# Patient Record
Sex: MALE | Race: WHITE | NOT HISPANIC OR LATINO | Employment: OTHER | URBAN - METROPOLITAN AREA
[De-identification: names, ages, dates, MRNs, and addresses within clinical notes are randomized per-mention and may not be internally consistent; named-entity substitution may affect disease eponyms.]

---

## 2017-01-01 ENCOUNTER — HOSPITAL ENCOUNTER (INPATIENT)
Facility: HOSPITAL | Age: 82
LOS: 1 days | DRG: 871 | End: 2017-09-21
Attending: EMERGENCY MEDICINE | Admitting: HOSPITALIST
Payer: MEDICARE

## 2017-01-01 ENCOUNTER — ANESTHESIA EVENT (OUTPATIENT)
Dept: SURGERY | Facility: HOSPITAL | Age: 82
DRG: 480 | End: 2017-01-01
Payer: MEDICARE

## 2017-01-01 ENCOUNTER — ANESTHESIA (OUTPATIENT)
Dept: SURGERY | Facility: HOSPITAL | Age: 82
DRG: 480 | End: 2017-01-01
Payer: MEDICARE

## 2017-01-01 ENCOUNTER — OUTPATIENT CASE MANAGEMENT (OUTPATIENT)
Dept: ADMINISTRATIVE | Facility: OTHER | Age: 82
End: 2017-01-01

## 2017-01-01 ENCOUNTER — PATIENT OUTREACH (OUTPATIENT)
Dept: ADMINISTRATIVE | Facility: CLINIC | Age: 82
End: 2017-01-01

## 2017-01-01 ENCOUNTER — HOSPITAL ENCOUNTER (INPATIENT)
Facility: HOSPITAL | Age: 82
LOS: 6 days | Discharge: NURSING, REHAB OR LONG-TERM CARE | DRG: 480 | End: 2017-07-07
Attending: HOSPITALIST | Admitting: HOSPITALIST
Payer: MEDICARE

## 2017-01-01 ENCOUNTER — OFFICE VISIT (OUTPATIENT)
Dept: NEUROLOGY | Facility: CLINIC | Age: 82
End: 2017-01-01
Payer: MEDICARE

## 2017-01-01 ENCOUNTER — OFFICE VISIT (OUTPATIENT)
Dept: ORTHOPEDICS | Facility: CLINIC | Age: 82
End: 2017-01-01
Payer: MEDICARE

## 2017-01-01 ENCOUNTER — HOSPITAL ENCOUNTER (OUTPATIENT)
Facility: HOSPITAL | Age: 82
Discharge: NURSING, REHAB OR LONG-TERM CARE | End: 2017-08-24
Attending: EMERGENCY MEDICINE | Admitting: HOSPITALIST
Payer: MEDICARE

## 2017-01-01 ENCOUNTER — HOSPITAL ENCOUNTER (INPATIENT)
Facility: HOSPITAL | Age: 82
LOS: 5 days | Discharge: HOME HEALTH | DRG: 871 | End: 2017-08-14
Attending: EMERGENCY MEDICINE | Admitting: HOSPITALIST
Payer: MEDICARE

## 2017-01-01 VITALS
BODY MASS INDEX: 25.06 KG/M2 | RESPIRATION RATE: 23 BRPM | SYSTOLIC BLOOD PRESSURE: 69 MMHG | WEIGHT: 185 LBS | DIASTOLIC BLOOD PRESSURE: 46 MMHG | HEIGHT: 72 IN | TEMPERATURE: 96 F | HEART RATE: 77 BPM | OXYGEN SATURATION: 89 %

## 2017-01-01 VITALS
DIASTOLIC BLOOD PRESSURE: 60 MMHG | HEART RATE: 64 BPM | BODY MASS INDEX: 25.91 KG/M2 | SYSTOLIC BLOOD PRESSURE: 107 MMHG | TEMPERATURE: 98 F | OXYGEN SATURATION: 97 % | WEIGHT: 191.31 LBS | RESPIRATION RATE: 18 BRPM | HEIGHT: 72 IN

## 2017-01-01 VITALS
DIASTOLIC BLOOD PRESSURE: 61 MMHG | SYSTOLIC BLOOD PRESSURE: 121 MMHG | WEIGHT: 202 LBS | HEART RATE: 70 BPM | RESPIRATION RATE: 16 BRPM | HEIGHT: 72 IN | TEMPERATURE: 97 F | OXYGEN SATURATION: 95 % | BODY MASS INDEX: 27.36 KG/M2

## 2017-01-01 VITALS
OXYGEN SATURATION: 92 % | BODY MASS INDEX: 25.07 KG/M2 | WEIGHT: 185.13 LBS | TEMPERATURE: 97 F | RESPIRATION RATE: 17 BRPM | SYSTOLIC BLOOD PRESSURE: 125 MMHG | HEIGHT: 72 IN | DIASTOLIC BLOOD PRESSURE: 61 MMHG | HEART RATE: 60 BPM

## 2017-01-01 VITALS
HEART RATE: 90 BPM | SYSTOLIC BLOOD PRESSURE: 194 MMHG | DIASTOLIC BLOOD PRESSURE: 103 MMHG | BODY MASS INDEX: 28.53 KG/M2 | WEIGHT: 199.31 LBS | HEIGHT: 70 IN

## 2017-01-01 DIAGNOSIS — I10 ESSENTIAL HYPERTENSION: ICD-10-CM

## 2017-01-01 DIAGNOSIS — F01.50 VASCULAR DEMENTIA WITHOUT BEHAVIORAL DISTURBANCE: ICD-10-CM

## 2017-01-01 DIAGNOSIS — I82.403 ACUTE DEEP VEIN THROMBOSIS (DVT) OF BOTH LOWER EXTREMITIES, UNSPECIFIED VEIN: Primary | ICD-10-CM

## 2017-01-01 DIAGNOSIS — E03.9 ACQUIRED HYPOTHYROIDISM: ICD-10-CM

## 2017-01-01 DIAGNOSIS — R09.02 HYPOXIA: ICD-10-CM

## 2017-01-01 DIAGNOSIS — S72.141D CLOSED DISPLACED INTERTROCHANTERIC FRACTURE OF RIGHT FEMUR WITH ROUTINE HEALING, SUBSEQUENT ENCOUNTER: Primary | ICD-10-CM

## 2017-01-01 DIAGNOSIS — G40.909 SEIZURE DISORDER: ICD-10-CM

## 2017-01-01 DIAGNOSIS — S72.009D ENCOUNTER FOR AFTERCARE FOR HEALING CLOSED TRAUMATIC FRACTURE OF HIP: ICD-10-CM

## 2017-01-01 DIAGNOSIS — R41.3 MEMORY LOSS: ICD-10-CM

## 2017-01-01 DIAGNOSIS — G40.209 PARTIAL SYMPTOMATIC EPILEPSY WITH COMPLEX PARTIAL SEIZURES, NOT INTRACTABLE, WITHOUT STATUS EPILEPTICUS: ICD-10-CM

## 2017-01-01 DIAGNOSIS — I26.02 ACUTE SADDLE PULMONARY EMBOLISM WITH ACUTE COR PULMONALE: ICD-10-CM

## 2017-01-01 DIAGNOSIS — R52 PAIN: ICD-10-CM

## 2017-01-01 DIAGNOSIS — G40.909 SEIZURE DISORDER: Primary | ICD-10-CM

## 2017-01-01 DIAGNOSIS — Z66 DNR (DO NOT RESUSCITATE): ICD-10-CM

## 2017-01-01 DIAGNOSIS — N17.9 AKI (ACUTE KIDNEY INJURY): Primary | ICD-10-CM

## 2017-01-01 DIAGNOSIS — I95.9 HYPOTENSION, UNSPECIFIED HYPOTENSION TYPE: ICD-10-CM

## 2017-01-01 DIAGNOSIS — I48.91 A-FIB: ICD-10-CM

## 2017-01-01 DIAGNOSIS — R65.10 SIRS (SYSTEMIC INFLAMMATORY RESPONSE SYNDROME): ICD-10-CM

## 2017-01-01 DIAGNOSIS — I26.92 ACUTE SADDLE PULMONARY EMBOLISM: ICD-10-CM

## 2017-01-01 DIAGNOSIS — S72.141A CLOSED INTERTROCHANTERIC FRACTURE OF RIGHT FEMUR, INITIAL ENCOUNTER: ICD-10-CM

## 2017-01-01 DIAGNOSIS — I82.593 CHRONIC EMBOLISM AND THROMBOSIS OF OTHER SPECIFIED DEEP VEIN OF LOWER EXTREMITY, BILATERAL: ICD-10-CM

## 2017-01-01 DIAGNOSIS — Z95.2 S/P AVR (AORTIC VALVE REPLACEMENT): ICD-10-CM

## 2017-01-01 DIAGNOSIS — Z51.5 PALLIATIVE CARE ENCOUNTER: ICD-10-CM

## 2017-01-01 DIAGNOSIS — R31.9 HEMATURIA: ICD-10-CM

## 2017-01-01 DIAGNOSIS — R65.21 SEPTIC SHOCK: ICD-10-CM

## 2017-01-01 DIAGNOSIS — N39.0 COMPLICATED UTI (URINARY TRACT INFECTION): ICD-10-CM

## 2017-01-01 DIAGNOSIS — I48.91 ATRIAL FIBRILLATION, UNSPECIFIED TYPE: ICD-10-CM

## 2017-01-01 DIAGNOSIS — E55.9 VITAMIN D DEFICIENCY DISEASE: ICD-10-CM

## 2017-01-01 DIAGNOSIS — M81.0 OSTEOPOROSIS, SENILE: ICD-10-CM

## 2017-01-01 DIAGNOSIS — A41.9 SEPTIC SHOCK: ICD-10-CM

## 2017-01-01 DIAGNOSIS — M41.20 SCOLIOSIS (AND KYPHOSCOLIOSIS), IDIOPATHIC: ICD-10-CM

## 2017-01-01 DIAGNOSIS — R41.0 DELIRIUM: ICD-10-CM

## 2017-01-01 DIAGNOSIS — N17.9 AKI (ACUTE KIDNEY INJURY): ICD-10-CM

## 2017-01-01 DIAGNOSIS — N30.01 ACUTE CYSTITIS WITH HEMATURIA: ICD-10-CM

## 2017-01-01 DIAGNOSIS — G93.40 ENCEPHALOPATHY: ICD-10-CM

## 2017-01-01 DIAGNOSIS — S72.009D: ICD-10-CM

## 2017-01-01 DIAGNOSIS — A41.9 SEPSIS, DUE TO UNSPECIFIED ORGANISM: ICD-10-CM

## 2017-01-01 DIAGNOSIS — R41.82 ALTERED MENTAL STATUS, UNSPECIFIED ALTERED MENTAL STATUS TYPE: Primary | ICD-10-CM

## 2017-01-01 DIAGNOSIS — R00.0 TACHYCARDIA: ICD-10-CM

## 2017-01-01 DIAGNOSIS — G93.41 SEPTIC ENCEPHALOPATHY: ICD-10-CM

## 2017-01-01 DIAGNOSIS — Z71.89 GOALS OF CARE, COUNSELING/DISCUSSION: ICD-10-CM

## 2017-01-01 DIAGNOSIS — N39.0 COMPLICATED UTI (URINARY TRACT INFECTION): Primary | ICD-10-CM

## 2017-01-01 DIAGNOSIS — A41.9 SEPSIS: ICD-10-CM

## 2017-01-01 DIAGNOSIS — R33.9 URINARY RETENTION: ICD-10-CM

## 2017-01-01 DIAGNOSIS — S72.141D CLOSED INTERTROCHANTERIC FRACTURE OF RIGHT FEMUR, WITH ROUTINE HEALING, SUBSEQUENT ENCOUNTER: Primary | ICD-10-CM

## 2017-01-01 DIAGNOSIS — I48.0 PAROXYSMAL ATRIAL FIBRILLATION: ICD-10-CM

## 2017-01-01 DIAGNOSIS — S72.91XA FEMUR FRACTURE, RIGHT: ICD-10-CM

## 2017-01-01 DIAGNOSIS — J96.01 ACUTE RESPIRATORY FAILURE WITH HYPOXIA: ICD-10-CM

## 2017-01-01 DIAGNOSIS — I26.92 ACUTE SADDLE PULMONARY EMBOLISM WITHOUT ACUTE COR PULMONALE: ICD-10-CM

## 2017-01-01 LAB
25(OH)D3+25(OH)D2 SERPL-MCNC: 16 NG/ML
ABO + RH BLD: NORMAL
ALBUMIN SERPL BCP-MCNC: 2.3 G/DL
ALBUMIN SERPL BCP-MCNC: 2.4 G/DL
ALBUMIN SERPL BCP-MCNC: 2.4 G/DL
ALBUMIN SERPL BCP-MCNC: 2.5 G/DL
ALBUMIN SERPL BCP-MCNC: 2.5 G/DL
ALBUMIN SERPL BCP-MCNC: 2.6 G/DL
ALBUMIN SERPL BCP-MCNC: 2.6 G/DL
ALBUMIN SERPL BCP-MCNC: 2.7 G/DL
ALBUMIN SERPL BCP-MCNC: 2.8 G/DL
ALBUMIN SERPL BCP-MCNC: 2.8 G/DL
ALBUMIN SERPL BCP-MCNC: 2.9 G/DL
ALBUMIN SERPL BCP-MCNC: 3.1 G/DL
ALBUMIN SERPL BCP-MCNC: 3.2 G/DL
ALBUMIN SERPL BCP-MCNC: 3.4 G/DL
ALLENS TEST: ABNORMAL
ALLENS TEST: ABNORMAL
ALLENS TEST: NORMAL
ALP SERPL-CCNC: 102 U/L
ALP SERPL-CCNC: 110 U/L
ALP SERPL-CCNC: 122 U/L
ALP SERPL-CCNC: 220 U/L
ALP SERPL-CCNC: 86 U/L
ALP SERPL-CCNC: 88 U/L
ALP SERPL-CCNC: 92 U/L
ALP SERPL-CCNC: 95 U/L
ALP SERPL-CCNC: 96 U/L
ALP SERPL-CCNC: 98 U/L
ALP SERPL-CCNC: 99 U/L
ALT SERPL W/O P-5'-P-CCNC: 15 U/L
ALT SERPL W/O P-5'-P-CCNC: 15 U/L
ALT SERPL W/O P-5'-P-CCNC: 163 U/L
ALT SERPL W/O P-5'-P-CCNC: 17 U/L
ALT SERPL W/O P-5'-P-CCNC: 20 U/L
ALT SERPL W/O P-5'-P-CCNC: 21 U/L
ALT SERPL W/O P-5'-P-CCNC: 22 U/L
ALT SERPL W/O P-5'-P-CCNC: 22 U/L
ALT SERPL W/O P-5'-P-CCNC: 23 U/L
ALT SERPL W/O P-5'-P-CCNC: 24 U/L
ALT SERPL W/O P-5'-P-CCNC: 25 U/L
AMORPH CRY UR QL COMP ASSIST: ABNORMAL
ANION GAP SERPL CALC-SCNC: 10 MMOL/L
ANION GAP SERPL CALC-SCNC: 11 MMOL/L
ANION GAP SERPL CALC-SCNC: 12 MMOL/L
ANION GAP SERPL CALC-SCNC: 14 MMOL/L
ANION GAP SERPL CALC-SCNC: 15 MMOL/L
ANION GAP SERPL CALC-SCNC: 8 MMOL/L
ANION GAP SERPL CALC-SCNC: 8 MMOL/L
ANION GAP SERPL CALC-SCNC: 9 MMOL/L
ANION GAP SERPL CALC-SCNC: 9 MMOL/L
ANISOCYTOSIS BLD QL SMEAR: SLIGHT
APTT BLDCRRT: 23.1 SEC
APTT BLDCRRT: 82.7 SEC
AST SERPL-CCNC: 15 U/L
AST SERPL-CCNC: 17 U/L
AST SERPL-CCNC: 21 U/L
AST SERPL-CCNC: 25 U/L
AST SERPL-CCNC: 28 U/L
AST SERPL-CCNC: 29 U/L
AST SERPL-CCNC: 366 U/L
AST SERPL-CCNC: 44 U/L
AST SERPL-CCNC: 52 U/L
BACTERIA #/AREA URNS AUTO: ABNORMAL /HPF
BACTERIA #/AREA URNS AUTO: NORMAL /HPF
BACTERIA BLD CULT: NORMAL
BACTERIA UR CULT: NORMAL
BACTERIA UR CULT: NORMAL
BASOPHILS # BLD AUTO: 0.01 K/UL
BASOPHILS # BLD AUTO: 0.02 K/UL
BASOPHILS # BLD AUTO: 0.03 K/UL
BASOPHILS # BLD AUTO: 0.04 K/UL
BASOPHILS # BLD AUTO: 0.05 K/UL
BASOPHILS # BLD AUTO: 0.06 K/UL
BASOPHILS # BLD AUTO: 0.08 K/UL
BASOPHILS NFR BLD: 0.1 %
BASOPHILS NFR BLD: 0.2 %
BASOPHILS NFR BLD: 0.3 %
BASOPHILS NFR BLD: 0.4 %
BASOPHILS NFR BLD: 0.5 %
BASOPHILS NFR BLD: 0.5 %
BASOPHILS NFR BLD: 0.7 %
BASOPHILS NFR BLD: 1 %
BILIRUB SERPL-MCNC: 0.3 MG/DL
BILIRUB SERPL-MCNC: 0.5 MG/DL
BILIRUB SERPL-MCNC: 0.6 MG/DL
BILIRUB SERPL-MCNC: 0.7 MG/DL
BILIRUB SERPL-MCNC: 0.7 MG/DL
BILIRUB SERPL-MCNC: 1.4 MG/DL
BILIRUB UR QL STRIP: NEGATIVE
BLD GP AB SCN CELLS X3 SERPL QL: NORMAL
BLD PROD TYP BPU: NORMAL
BLOOD UNIT EXPIRATION DATE: NORMAL
BLOOD UNIT TYPE CODE: 5100
BLOOD UNIT TYPE: NORMAL
BNP SERPL-MCNC: 153 PG/ML
BNP SERPL-MCNC: 321 PG/ML
BNP SERPL-MCNC: 62 PG/ML
BUN SERPL-MCNC: 12 MG/DL
BUN SERPL-MCNC: 14 MG/DL
BUN SERPL-MCNC: 16 MG/DL
BUN SERPL-MCNC: 17 MG/DL
BUN SERPL-MCNC: 18 MG/DL
BUN SERPL-MCNC: 21 MG/DL
BUN SERPL-MCNC: 21 MG/DL
BUN SERPL-MCNC: 22 MG/DL
BUN SERPL-MCNC: 23 MG/DL
BUN SERPL-MCNC: 24 MG/DL
BUN SERPL-MCNC: 27 MG/DL
BUN SERPL-MCNC: 29 MG/DL
BUN SERPL-MCNC: 29 MG/DL
BUN SERPL-MCNC: 39 MG/DL
CALCIUM SERPL-MCNC: 7.9 MG/DL
CALCIUM SERPL-MCNC: 8.1 MG/DL
CALCIUM SERPL-MCNC: 8.1 MG/DL
CALCIUM SERPL-MCNC: 8.3 MG/DL
CALCIUM SERPL-MCNC: 8.3 MG/DL
CALCIUM SERPL-MCNC: 8.4 MG/DL
CALCIUM SERPL-MCNC: 8.4 MG/DL
CALCIUM SERPL-MCNC: 8.5 MG/DL
CALCIUM SERPL-MCNC: 8.6 MG/DL
CALCIUM SERPL-MCNC: 8.7 MG/DL
CALCIUM SERPL-MCNC: 8.7 MG/DL
CALCIUM SERPL-MCNC: 8.8 MG/DL
CALCIUM SERPL-MCNC: 8.8 MG/DL
CALCIUM SERPL-MCNC: 8.9 MG/DL
CALCIUM SERPL-MCNC: 9 MG/DL
CALCIUM SERPL-MCNC: 9 MG/DL
CALCIUM SERPL-MCNC: 9.1 MG/DL
CHLORIDE SERPL-SCNC: 107 MMOL/L
CHLORIDE SERPL-SCNC: 108 MMOL/L
CHLORIDE SERPL-SCNC: 109 MMOL/L
CHLORIDE SERPL-SCNC: 111 MMOL/L
CHLORIDE SERPL-SCNC: 112 MMOL/L
CHLORIDE SERPL-SCNC: 113 MMOL/L
CHLORIDE SERPL-SCNC: 114 MMOL/L
CLARITY UR REFRACT.AUTO: ABNORMAL
CLARITY UR REFRACT.AUTO: CLEAR
CO2 SERPL-SCNC: 16 MMOL/L
CO2 SERPL-SCNC: 18 MMOL/L
CO2 SERPL-SCNC: 19 MMOL/L
CO2 SERPL-SCNC: 20 MMOL/L
CO2 SERPL-SCNC: 21 MMOL/L
CO2 SERPL-SCNC: 22 MMOL/L
CO2 SERPL-SCNC: 22 MMOL/L
CODING SYSTEM: NORMAL
COLOR UR AUTO: ABNORMAL
COLOR UR AUTO: YELLOW
CORTIS SERPL-MCNC: 47.3 UG/DL
CREAT SERPL-MCNC: 0.9 MG/DL
CREAT SERPL-MCNC: 1 MG/DL
CREAT SERPL-MCNC: 1.1 MG/DL
CREAT SERPL-MCNC: 1.2 MG/DL
CREAT SERPL-MCNC: 1.3 MG/DL
CREAT SERPL-MCNC: 1.4 MG/DL
CREAT SERPL-MCNC: 1.6 MG/DL
CREAT SERPL-MCNC: 1.9 MG/DL
CREAT UR-MCNC: 26 MG/DL
DELSYS: ABNORMAL
DELSYS: ABNORMAL
DELSYS: NORMAL
DIFFERENTIAL METHOD: ABNORMAL
DISPENSE STATUS: NORMAL
EOSINOPHIL # BLD AUTO: 0 K/UL
EOSINOPHIL # BLD AUTO: 0 K/UL
EOSINOPHIL # BLD AUTO: 0.1 K/UL
EOSINOPHIL # BLD AUTO: 0.2 K/UL
EOSINOPHIL # BLD AUTO: 0.3 K/UL
EOSINOPHIL # BLD AUTO: 0.4 K/UL
EOSINOPHIL # BLD AUTO: 0.5 K/UL
EOSINOPHIL # BLD AUTO: 0.6 K/UL
EOSINOPHIL # BLD AUTO: 0.7 K/UL
EOSINOPHIL NFR BLD: 0.2 %
EOSINOPHIL NFR BLD: 0.3 %
EOSINOPHIL NFR BLD: 1 %
EOSINOPHIL NFR BLD: 1 %
EOSINOPHIL NFR BLD: 1.2 %
EOSINOPHIL NFR BLD: 1.3 %
EOSINOPHIL NFR BLD: 1.3 %
EOSINOPHIL NFR BLD: 1.5 %
EOSINOPHIL NFR BLD: 1.7 %
EOSINOPHIL NFR BLD: 1.7 %
EOSINOPHIL NFR BLD: 2.4 %
EOSINOPHIL NFR BLD: 3.1 %
EOSINOPHIL NFR BLD: 3.2 %
EOSINOPHIL NFR BLD: 3.2 %
EOSINOPHIL NFR BLD: 3.6 %
EOSINOPHIL NFR BLD: 4.2 %
EOSINOPHIL NFR BLD: 5.8 %
EOSINOPHIL NFR BLD: 6.4 %
EOSINOPHIL NFR BLD: 6.5 %
EOSINOPHIL NFR BLD: 6.9 %
EOSINOPHIL NFR BLD: 7.1 %
EOSINOPHIL NFR BLD: 7.5 %
ERYTHROCYTE [DISTWIDTH] IN BLOOD BY AUTOMATED COUNT: 13.9 %
ERYTHROCYTE [DISTWIDTH] IN BLOOD BY AUTOMATED COUNT: 14 %
ERYTHROCYTE [DISTWIDTH] IN BLOOD BY AUTOMATED COUNT: 14.1 %
ERYTHROCYTE [DISTWIDTH] IN BLOOD BY AUTOMATED COUNT: 14.2 %
ERYTHROCYTE [DISTWIDTH] IN BLOOD BY AUTOMATED COUNT: 14.4 %
ERYTHROCYTE [DISTWIDTH] IN BLOOD BY AUTOMATED COUNT: 14.6 %
ERYTHROCYTE [DISTWIDTH] IN BLOOD BY AUTOMATED COUNT: 14.7 %
ERYTHROCYTE [DISTWIDTH] IN BLOOD BY AUTOMATED COUNT: 14.9 %
ERYTHROCYTE [DISTWIDTH] IN BLOOD BY AUTOMATED COUNT: 14.9 %
ERYTHROCYTE [DISTWIDTH] IN BLOOD BY AUTOMATED COUNT: 15 %
ERYTHROCYTE [DISTWIDTH] IN BLOOD BY AUTOMATED COUNT: 15.1 %
ERYTHROCYTE [DISTWIDTH] IN BLOOD BY AUTOMATED COUNT: 15.3 %
ERYTHROCYTE [DISTWIDTH] IN BLOOD BY AUTOMATED COUNT: 17 %
ERYTHROCYTE [SEDIMENTATION RATE] IN BLOOD BY WESTERGREN METHOD: 16 MM/H
EST. GFR  (AFRICAN AMERICAN): 35.6 ML/MIN/1.73 M^2
EST. GFR  (AFRICAN AMERICAN): 43.8 ML/MIN/1.73 M^2
EST. GFR  (AFRICAN AMERICAN): 51.5 ML/MIN/1.73 M^2
EST. GFR  (AFRICAN AMERICAN): 56.3 ML/MIN/1.73 M^2
EST. GFR  (AFRICAN AMERICAN): >60 ML/MIN/1.73 M^2
EST. GFR  (NON AFRICAN AMERICAN): 30.8 ML/MIN/1.73 M^2
EST. GFR  (NON AFRICAN AMERICAN): 37.9 ML/MIN/1.73 M^2
EST. GFR  (NON AFRICAN AMERICAN): 44.5 ML/MIN/1.73 M^2
EST. GFR  (NON AFRICAN AMERICAN): 48.7 ML/MIN/1.73 M^2
EST. GFR  (NON AFRICAN AMERICAN): 53.7 ML/MIN/1.73 M^2
EST. GFR  (NON AFRICAN AMERICAN): 59.6 ML/MIN/1.73 M^2
EST. GFR  (NON AFRICAN AMERICAN): >60 ML/MIN/1.73 M^2
ESTIMATED PA SYSTOLIC PRESSURE: 31.52
FACT X PPP CHRO-ACNC: 0.17 IU/ML
FACT X PPP CHRO-ACNC: 0.17 IU/ML
FACT X PPP CHRO-ACNC: 0.35 IU/ML
FACT X PPP CHRO-ACNC: 0.45 IU/ML
FACT X PPP CHRO-ACNC: 0.47 IU/ML
FACT X PPP CHRO-ACNC: 0.76 IU/ML
FACT X PPP CHRO-ACNC: 1.1 IU/ML
FIO2: 50
GLUCOSE SERPL-MCNC: 108 MG/DL
GLUCOSE SERPL-MCNC: 110 MG/DL
GLUCOSE SERPL-MCNC: 112 MG/DL
GLUCOSE SERPL-MCNC: 113 MG/DL
GLUCOSE SERPL-MCNC: 115 MG/DL
GLUCOSE SERPL-MCNC: 121 MG/DL
GLUCOSE SERPL-MCNC: 122 MG/DL
GLUCOSE SERPL-MCNC: 124 MG/DL
GLUCOSE SERPL-MCNC: 130 MG/DL
GLUCOSE SERPL-MCNC: 131 MG/DL
GLUCOSE SERPL-MCNC: 136 MG/DL
GLUCOSE SERPL-MCNC: 142 MG/DL
GLUCOSE SERPL-MCNC: 145 MG/DL
GLUCOSE SERPL-MCNC: 146 MG/DL
GLUCOSE SERPL-MCNC: 146 MG/DL
GLUCOSE SERPL-MCNC: 81 MG/DL
GLUCOSE SERPL-MCNC: 90 MG/DL
GLUCOSE SERPL-MCNC: 96 MG/DL
GLUCOSE SERPL-MCNC: 98 MG/DL
GLUCOSE UR QL STRIP: NEGATIVE
HCO3 UR-SCNC: 18.8 MMOL/L (ref 24–28)
HCO3 UR-SCNC: 21.5 MMOL/L (ref 24–28)
HCT VFR BLD AUTO: 28.4 %
HCT VFR BLD AUTO: 30.3 %
HCT VFR BLD AUTO: 32 %
HCT VFR BLD AUTO: 32.1 %
HCT VFR BLD AUTO: 32.2 %
HCT VFR BLD AUTO: 32.6 %
HCT VFR BLD AUTO: 32.7 %
HCT VFR BLD AUTO: 32.8 %
HCT VFR BLD AUTO: 33.7 %
HCT VFR BLD AUTO: 33.9 %
HCT VFR BLD AUTO: 34 %
HCT VFR BLD AUTO: 34.4 %
HCT VFR BLD AUTO: 34.7 %
HCT VFR BLD AUTO: 34.9 %
HCT VFR BLD AUTO: 35 %
HCT VFR BLD AUTO: 35.4 %
HCT VFR BLD AUTO: 35.9 %
HCT VFR BLD AUTO: 36.9 %
HCT VFR BLD AUTO: 39.1 %
HCT VFR BLD AUTO: 39.5 %
HGB BLD-MCNC: 10.3 G/DL
HGB BLD-MCNC: 10.4 G/DL
HGB BLD-MCNC: 10.5 G/DL
HGB BLD-MCNC: 10.6 G/DL
HGB BLD-MCNC: 10.6 G/DL
HGB BLD-MCNC: 10.7 G/DL
HGB BLD-MCNC: 10.8 G/DL
HGB BLD-MCNC: 10.8 G/DL
HGB BLD-MCNC: 10.9 G/DL
HGB BLD-MCNC: 11 G/DL
HGB BLD-MCNC: 11.3 G/DL
HGB BLD-MCNC: 11.3 G/DL
HGB BLD-MCNC: 11.5 G/DL
HGB BLD-MCNC: 11.6 G/DL
HGB BLD-MCNC: 11.6 G/DL
HGB BLD-MCNC: 12.3 G/DL
HGB BLD-MCNC: 12.8 G/DL
HGB BLD-MCNC: 12.8 G/DL
HGB BLD-MCNC: 8.8 G/DL
HGB BLD-MCNC: 9.8 G/DL
HGB UR QL STRIP: ABNORMAL
HYALINE CASTS UR QL AUTO: 0 /LPF
INR PPP: 1
INR PPP: 1.1
INR PPP: 1.3
KETONES UR QL STRIP: ABNORMAL
KETONES UR QL STRIP: NEGATIVE
LACTATE SERPL-SCNC: 0.8 MMOL/L
LACTATE SERPL-SCNC: 1.1 MMOL/L
LACTATE SERPL-SCNC: 9.1 MMOL/L
LDH SERPL L TO P-CCNC: 1.08 MMOL/L (ref 0.36–1.25)
LEUKOCYTE ESTERASE UR QL STRIP: ABNORMAL
LEUKOCYTE ESTERASE UR QL STRIP: NEGATIVE
LIPASE SERPL-CCNC: 29 U/L
LYMPHOCYTES # BLD AUTO: 1.1 K/UL
LYMPHOCYTES # BLD AUTO: 2.1 K/UL
LYMPHOCYTES # BLD AUTO: 2.1 K/UL
LYMPHOCYTES # BLD AUTO: 2.2 K/UL
LYMPHOCYTES # BLD AUTO: 2.9 K/UL
LYMPHOCYTES # BLD AUTO: 3 K/UL
LYMPHOCYTES # BLD AUTO: 3 K/UL
LYMPHOCYTES # BLD AUTO: 3.1 K/UL
LYMPHOCYTES # BLD AUTO: 3.1 K/UL
LYMPHOCYTES # BLD AUTO: 3.2 K/UL
LYMPHOCYTES # BLD AUTO: 3.2 K/UL
LYMPHOCYTES # BLD AUTO: 3.3 K/UL
LYMPHOCYTES # BLD AUTO: 3.3 K/UL
LYMPHOCYTES # BLD AUTO: 3.5 K/UL
LYMPHOCYTES # BLD AUTO: 3.6 K/UL
LYMPHOCYTES # BLD AUTO: 3.9 K/UL
LYMPHOCYTES # BLD AUTO: 4.1 K/UL
LYMPHOCYTES # BLD AUTO: 4.6 K/UL
LYMPHOCYTES NFR BLD: 20.9 %
LYMPHOCYTES NFR BLD: 21.1 %
LYMPHOCYTES NFR BLD: 23.2 %
LYMPHOCYTES NFR BLD: 23.2 %
LYMPHOCYTES NFR BLD: 23.4 %
LYMPHOCYTES NFR BLD: 23.9 %
LYMPHOCYTES NFR BLD: 27.9 %
LYMPHOCYTES NFR BLD: 29.4 %
LYMPHOCYTES NFR BLD: 29.7 %
LYMPHOCYTES NFR BLD: 29.8 %
LYMPHOCYTES NFR BLD: 30.1 %
LYMPHOCYTES NFR BLD: 30.5 %
LYMPHOCYTES NFR BLD: 30.8 %
LYMPHOCYTES NFR BLD: 31.3 %
LYMPHOCYTES NFR BLD: 33.5 %
LYMPHOCYTES NFR BLD: 35.2 %
LYMPHOCYTES NFR BLD: 36.3 %
LYMPHOCYTES NFR BLD: 37 %
LYMPHOCYTES NFR BLD: 39.8 %
LYMPHOCYTES NFR BLD: 40.6 %
LYMPHOCYTES NFR BLD: 45.1 %
LYMPHOCYTES NFR BLD: 46.4 %
MAGNESIUM SERPL-MCNC: 1.5 MG/DL
MAGNESIUM SERPL-MCNC: 1.7 MG/DL
MAGNESIUM SERPL-MCNC: 1.8 MG/DL
MAGNESIUM SERPL-MCNC: 1.9 MG/DL
MAGNESIUM SERPL-MCNC: 2 MG/DL
MAGNESIUM SERPL-MCNC: 2 MG/DL
MAGNESIUM SERPL-MCNC: 2.1 MG/DL
MAGNESIUM SERPL-MCNC: 2.2 MG/DL
MAGNESIUM SERPL-MCNC: 2.4 MG/DL
MCH RBC QN AUTO: 28.8 PG
MCH RBC QN AUTO: 29.1 PG
MCH RBC QN AUTO: 29.3 PG
MCH RBC QN AUTO: 29.4 PG
MCH RBC QN AUTO: 29.5 PG
MCH RBC QN AUTO: 29.5 PG
MCH RBC QN AUTO: 29.7 PG
MCH RBC QN AUTO: 29.9 PG
MCH RBC QN AUTO: 30.1 PG
MCH RBC QN AUTO: 30.1 PG
MCH RBC QN AUTO: 30.2 PG
MCH RBC QN AUTO: 30.2 PG
MCH RBC QN AUTO: 30.3 PG
MCH RBC QN AUTO: 30.4 PG
MCH RBC QN AUTO: 30.4 PG
MCH RBC QN AUTO: 30.6 PG
MCH RBC QN AUTO: 30.7 PG
MCHC RBC AUTO-ENTMCNC: 31 G/DL
MCHC RBC AUTO-ENTMCNC: 31.8 G/DL
MCHC RBC AUTO-ENTMCNC: 31.8 G/DL
MCHC RBC AUTO-ENTMCNC: 31.9 G/DL
MCHC RBC AUTO-ENTMCNC: 32 G/DL
MCHC RBC AUTO-ENTMCNC: 32 G/DL
MCHC RBC AUTO-ENTMCNC: 32.1 %
MCHC RBC AUTO-ENTMCNC: 32.1 %
MCHC RBC AUTO-ENTMCNC: 32.2 %
MCHC RBC AUTO-ENTMCNC: 32.2 G/DL
MCHC RBC AUTO-ENTMCNC: 32.3 %
MCHC RBC AUTO-ENTMCNC: 32.3 %
MCHC RBC AUTO-ENTMCNC: 32.3 G/DL
MCHC RBC AUTO-ENTMCNC: 32.3 G/DL
MCHC RBC AUTO-ENTMCNC: 32.4 %
MCHC RBC AUTO-ENTMCNC: 32.4 %
MCHC RBC AUTO-ENTMCNC: 32.7 %
MCHC RBC AUTO-ENTMCNC: 33 G/DL
MCHC RBC AUTO-ENTMCNC: 33.1 %
MCHC RBC AUTO-ENTMCNC: 33.1 %
MCHC RBC AUTO-ENTMCNC: 33.2 %
MCHC RBC AUTO-ENTMCNC: 33.3 %
MCV RBC AUTO: 89 FL
MCV RBC AUTO: 90 FL
MCV RBC AUTO: 91 FL
MCV RBC AUTO: 92 FL
MCV RBC AUTO: 93 FL
MCV RBC AUTO: 94 FL
MICROSCOPIC COMMENT: ABNORMAL
MICROSCOPIC COMMENT: NORMAL
MODE: ABNORMAL
MONOCYTES # BLD AUTO: 0.1 K/UL
MONOCYTES # BLD AUTO: 0.4 K/UL
MONOCYTES # BLD AUTO: 0.5 K/UL
MONOCYTES # BLD AUTO: 0.6 K/UL
MONOCYTES # BLD AUTO: 0.7 K/UL
MONOCYTES # BLD AUTO: 0.8 K/UL
MONOCYTES # BLD AUTO: 0.9 K/UL
MONOCYTES # BLD AUTO: 1 K/UL
MONOCYTES # BLD AUTO: 1.2 K/UL
MONOCYTES # BLD AUTO: 1.3 K/UL
MONOCYTES NFR BLD: 0.9 %
MONOCYTES NFR BLD: 5.3 %
MONOCYTES NFR BLD: 5.7 %
MONOCYTES NFR BLD: 5.9 %
MONOCYTES NFR BLD: 6.3 %
MONOCYTES NFR BLD: 6.5 %
MONOCYTES NFR BLD: 6.7 %
MONOCYTES NFR BLD: 7.4 %
MONOCYTES NFR BLD: 7.7 %
MONOCYTES NFR BLD: 7.7 %
MONOCYTES NFR BLD: 7.8 %
MONOCYTES NFR BLD: 7.9 %
MONOCYTES NFR BLD: 8 %
MONOCYTES NFR BLD: 8 %
MONOCYTES NFR BLD: 8.1 %
MONOCYTES NFR BLD: 8.6 %
MONOCYTES NFR BLD: 8.8 %
MONOCYTES NFR BLD: 9 %
MONOCYTES NFR BLD: 9.2 %
MONOCYTES NFR BLD: 9.2 %
NEUTROPHILS # BLD AUTO: 3.1 K/UL
NEUTROPHILS # BLD AUTO: 3.2 K/UL
NEUTROPHILS # BLD AUTO: 3.6 K/UL
NEUTROPHILS # BLD AUTO: 3.8 K/UL
NEUTROPHILS # BLD AUTO: 3.8 K/UL
NEUTROPHILS # BLD AUTO: 4.2 K/UL
NEUTROPHILS # BLD AUTO: 4.2 K/UL
NEUTROPHILS # BLD AUTO: 4.3 K/UL
NEUTROPHILS # BLD AUTO: 5.9 K/UL
NEUTROPHILS # BLD AUTO: 6 K/UL
NEUTROPHILS # BLD AUTO: 6.1 K/UL
NEUTROPHILS # BLD AUTO: 6.1 K/UL
NEUTROPHILS # BLD AUTO: 6.2 K/UL
NEUTROPHILS # BLD AUTO: 6.4 K/UL
NEUTROPHILS # BLD AUTO: 6.5 K/UL
NEUTROPHILS # BLD AUTO: 6.6 K/UL
NEUTROPHILS # BLD AUTO: 6.9 K/UL
NEUTROPHILS # BLD AUTO: 7.3 K/UL
NEUTROPHILS # BLD AUTO: 7.7 K/UL
NEUTROPHILS # BLD AUTO: 8.1 K/UL
NEUTROPHILS # BLD AUTO: 8.5 K/UL
NEUTROPHILS # BLD AUTO: 9.6 K/UL
NEUTROPHILS NFR BLD: 38.8 %
NEUTROPHILS NFR BLD: 42.3 %
NEUTROPHILS NFR BLD: 44 %
NEUTROPHILS NFR BLD: 47.2 %
NEUTROPHILS NFR BLD: 47.5 %
NEUTROPHILS NFR BLD: 48.1 %
NEUTROPHILS NFR BLD: 49 %
NEUTROPHILS NFR BLD: 56 %
NEUTROPHILS NFR BLD: 56.8 %
NEUTROPHILS NFR BLD: 58.7 %
NEUTROPHILS NFR BLD: 59.5 %
NEUTROPHILS NFR BLD: 60 %
NEUTROPHILS NFR BLD: 60 %
NEUTROPHILS NFR BLD: 61 %
NEUTROPHILS NFR BLD: 61.6 %
NEUTROPHILS NFR BLD: 61.8 %
NEUTROPHILS NFR BLD: 64.7 %
NEUTROPHILS NFR BLD: 65.9 %
NEUTROPHILS NFR BLD: 66.9 %
NEUTROPHILS NFR BLD: 66.9 %
NEUTROPHILS NFR BLD: 69.1 %
NEUTROPHILS NFR BLD: 77.8 %
NITRITE UR QL STRIP: NEGATIVE
NITRITE UR QL STRIP: POSITIVE
PCO2 BLDA: 32.6 MMHG (ref 35–45)
PCO2 BLDA: 38.7 MMHG (ref 35–45)
PEEP: 5
PH SMN: 7.29 [PH] (ref 7.35–7.45)
PH SMN: 7.43 [PH] (ref 7.35–7.45)
PH UR STRIP: 5 [PH] (ref 5–8)
PH UR STRIP: 6 [PH] (ref 5–8)
PH UR STRIP: 7 [PH] (ref 5–8)
PH UR STRIP: 8 [PH] (ref 5–8)
PHENOBARB SERPL-MCNC: 10.6 UG/DL
PHENOBARB SERPL-MCNC: 13 UG/DL
PHENOBARB SERPL-MCNC: 16.5 UG/DL
PHOSPHATE SERPL-MCNC: 2.5 MG/DL
PHOSPHATE SERPL-MCNC: 2.6 MG/DL
PHOSPHATE SERPL-MCNC: 2.6 MG/DL
PHOSPHATE SERPL-MCNC: 2.7 MG/DL
PHOSPHATE SERPL-MCNC: 3 MG/DL
PHOSPHATE SERPL-MCNC: 3 MG/DL
PHOSPHATE SERPL-MCNC: 3.1 MG/DL
PHOSPHATE SERPL-MCNC: 3.1 MG/DL
PHOSPHATE SERPL-MCNC: 3.2 MG/DL
PHOSPHATE SERPL-MCNC: 3.3 MG/DL
PHOSPHATE SERPL-MCNC: 3.4 MG/DL
PHOSPHATE SERPL-MCNC: 3.5 MG/DL
PHOSPHATE SERPL-MCNC: 3.9 MG/DL
PHOSPHATE SERPL-MCNC: 3.9 MG/DL
PLATELET # BLD AUTO: 121 K/UL
PLATELET # BLD AUTO: 127 K/UL
PLATELET # BLD AUTO: 130 K/UL
PLATELET # BLD AUTO: 131 K/UL
PLATELET # BLD AUTO: 131 K/UL
PLATELET # BLD AUTO: 132 K/UL
PLATELET # BLD AUTO: 139 K/UL
PLATELET # BLD AUTO: 140 K/UL
PLATELET # BLD AUTO: 144 K/UL
PLATELET # BLD AUTO: 147 K/UL
PLATELET # BLD AUTO: 159 K/UL
PLATELET # BLD AUTO: 180 K/UL
PLATELET # BLD AUTO: 201 K/UL
PLATELET # BLD AUTO: 210 K/UL
PLATELET # BLD AUTO: 237 K/UL
PLATELET # BLD AUTO: 240 K/UL
PLATELET # BLD AUTO: 254 K/UL
PLATELET # BLD AUTO: 254 K/UL
PLATELET # BLD AUTO: 259 K/UL
PLATELET # BLD AUTO: 260 K/UL
PLATELET # BLD AUTO: 280 K/UL
PLATELET # BLD AUTO: 323 K/UL
PLATELET BLD QL SMEAR: ABNORMAL
PMV BLD AUTO: 10 FL
PMV BLD AUTO: 10.1 FL
PMV BLD AUTO: 10.1 FL
PMV BLD AUTO: 10.2 FL
PMV BLD AUTO: 10.2 FL
PMV BLD AUTO: 10.3 FL
PMV BLD AUTO: 10.5 FL
PMV BLD AUTO: 10.5 FL
PMV BLD AUTO: 10.6 FL
PMV BLD AUTO: 10.7 FL
PMV BLD AUTO: 10.7 FL
PMV BLD AUTO: 8.7 FL
PMV BLD AUTO: 8.9 FL
PMV BLD AUTO: 9.1 FL
PMV BLD AUTO: 9.2 FL
PMV BLD AUTO: 9.3 FL
PMV BLD AUTO: 9.5 FL
PMV BLD AUTO: 9.5 FL
PMV BLD AUTO: 9.6 FL
PMV BLD AUTO: 9.6 FL
PMV BLD AUTO: 9.9 FL
PMV BLD AUTO: 9.9 FL
PO2 BLDA: 59 MMHG (ref 80–100)
PO2 BLDA: 67 MMHG (ref 80–100)
POC BE: -3 MMOL/L
POC BE: -8 MMOL/L
POC SATURATED O2: 91 % (ref 95–100)
POC SATURATED O2: 91 % (ref 95–100)
POC TCO2: 20 MMOL/L (ref 23–27)
POC TCO2: 22 MMOL/L (ref 23–27)
POTASSIUM SERPL-SCNC: 3.4 MMOL/L
POTASSIUM SERPL-SCNC: 3.5 MMOL/L
POTASSIUM SERPL-SCNC: 3.5 MMOL/L
POTASSIUM SERPL-SCNC: 3.6 MMOL/L
POTASSIUM SERPL-SCNC: 3.7 MMOL/L
POTASSIUM SERPL-SCNC: 3.9 MMOL/L
POTASSIUM SERPL-SCNC: 3.9 MMOL/L
POTASSIUM SERPL-SCNC: 4 MMOL/L
POTASSIUM SERPL-SCNC: 4.1 MMOL/L
POTASSIUM SERPL-SCNC: 4.3 MMOL/L
POTASSIUM SERPL-SCNC: 4.3 MMOL/L
POTASSIUM SERPL-SCNC: 4.8 MMOL/L
PREALB SERPL-MCNC: 20 MG/DL
PROCALCITONIN SERPL IA-MCNC: 0.18 NG/ML
PROCALCITONIN SERPL IA-MCNC: 23.95 NG/ML
PROT SERPL-MCNC: 6.1 G/DL
PROT SERPL-MCNC: 6.3 G/DL
PROT SERPL-MCNC: 6.3 G/DL
PROT SERPL-MCNC: 6.5 G/DL
PROT SERPL-MCNC: 6.5 G/DL
PROT SERPL-MCNC: 6.7 G/DL
PROT SERPL-MCNC: 7 G/DL
PROT SERPL-MCNC: 7.2 G/DL
PROT SERPL-MCNC: 7.4 G/DL
PROT UR QL STRIP: ABNORMAL
PROTHROMBIN TIME: 10.9 SEC
PROTHROMBIN TIME: 11.4 SEC
PROTHROMBIN TIME: 13.1 SEC
RBC # BLD AUTO: 3.02 M/UL
RBC # BLD AUTO: 3.35 M/UL
RBC # BLD AUTO: 3.48 M/UL
RBC # BLD AUTO: 3.48 M/UL
RBC # BLD AUTO: 3.49 M/UL
RBC # BLD AUTO: 3.52 M/UL
RBC # BLD AUTO: 3.52 M/UL
RBC # BLD AUTO: 3.53 M/UL
RBC # BLD AUTO: 3.59 M/UL
RBC # BLD AUTO: 3.6 M/UL
RBC # BLD AUTO: 3.61 M/UL
RBC # BLD AUTO: 3.68 M/UL
RBC # BLD AUTO: 3.75 M/UL
RBC # BLD AUTO: 3.78 M/UL
RBC # BLD AUTO: 3.78 M/UL
RBC # BLD AUTO: 3.79 M/UL
RBC # BLD AUTO: 3.79 M/UL
RBC # BLD AUTO: 3.8 M/UL
RBC # BLD AUTO: 3.85 M/UL
RBC # BLD AUTO: 4.05 M/UL
RBC # BLD AUTO: 4.22 M/UL
RBC # BLD AUTO: 4.23 M/UL
RBC #/AREA URNS AUTO: 2 /HPF (ref 0–4)
RBC #/AREA URNS AUTO: 3 /HPF (ref 0–4)
RBC #/AREA URNS AUTO: >100 /HPF (ref 0–4)
RBC #/AREA URNS AUTO: >100 /HPF (ref 0–4)
RETIRED EF AND QEF - SEE NOTES: 50 (ref 55–65)
SAMPLE: ABNORMAL
SAMPLE: ABNORMAL
SAMPLE: NORMAL
SITE: ABNORMAL
SITE: ABNORMAL
SITE: NORMAL
SODIUM SERPL-SCNC: 137 MMOL/L
SODIUM SERPL-SCNC: 138 MMOL/L
SODIUM SERPL-SCNC: 139 MMOL/L
SODIUM SERPL-SCNC: 140 MMOL/L
SODIUM SERPL-SCNC: 141 MMOL/L
SODIUM SERPL-SCNC: 142 MMOL/L
SODIUM SERPL-SCNC: 143 MMOL/L
SODIUM SERPL-SCNC: 144 MMOL/L
SODIUM UR-SCNC: 162 MMOL/L
SODIUM UR-SCNC: 28 MMOL/L
SP GR UR STRIP: 1.01 (ref 1–1.03)
SP GR UR STRIP: 1.01 (ref 1–1.03)
SP GR UR STRIP: 1.02 (ref 1–1.03)
SP GR UR STRIP: >1.03 (ref 1–1.03)
SP02: 95
SQUAMOUS #/AREA URNS AUTO: 2 /HPF
T4 FREE SERPL-MCNC: 0.82 NG/DL
TB INDURATION 48 - 72 HR READ: 0 MM
TRANS ERYTHROCYTES VOL PATIENT: NORMAL ML
TRANSFERRIN SERPL-MCNC: 208 MG/DL
TRICUSPID VALVE REGURGITATION: NORMAL
TROPONIN I SERPL DL<=0.01 NG/ML-MCNC: 0.03 NG/ML
TROPONIN I SERPL DL<=0.01 NG/ML-MCNC: 0.09 NG/ML
TROPONIN I SERPL DL<=0.01 NG/ML-MCNC: 0.25 NG/ML
TROPONIN I SERPL DL<=0.01 NG/ML-MCNC: 0.54 NG/ML
TSH SERPL DL<=0.005 MIU/L-ACNC: 1.42 UIU/ML
TSH SERPL DL<=0.005 MIU/L-ACNC: 2.56 UIU/ML
TSH SERPL DL<=0.005 MIU/L-ACNC: 6.38 UIU/ML
URN SPEC COLLECT METH UR: ABNORMAL
UROBILINOGEN UR STRIP-ACNC: NEGATIVE EU/DL
UUN UR-MCNC: 391 MG/DL
VANCOMYCIN SERPL-MCNC: 26.1 UG/ML
VANCOMYCIN TROUGH SERPL-MCNC: 2.7 UG/ML
VT: 450
WBC # BLD AUTO: 10.49 K/UL
WBC # BLD AUTO: 10.61 K/UL
WBC # BLD AUTO: 10.8 K/UL
WBC # BLD AUTO: 10.95 K/UL
WBC # BLD AUTO: 10.96 K/UL
WBC # BLD AUTO: 11.48 K/UL
WBC # BLD AUTO: 11.86 K/UL
WBC # BLD AUTO: 12.88 K/UL
WBC # BLD AUTO: 12.98 K/UL
WBC # BLD AUTO: 13.21 K/UL
WBC # BLD AUTO: 13.84 K/UL
WBC # BLD AUTO: 5.4 K/UL
WBC # BLD AUTO: 7.02 K/UL
WBC # BLD AUTO: 7.66 K/UL
WBC # BLD AUTO: 7.79 K/UL
WBC # BLD AUTO: 7.86 K/UL
WBC # BLD AUTO: 8.57 K/UL
WBC # BLD AUTO: 8.99 K/UL
WBC # BLD AUTO: 9.07 K/UL
WBC # BLD AUTO: 9.07 K/UL
WBC # BLD AUTO: 9.11 K/UL
WBC # BLD AUTO: 9.89 K/UL
WBC #/AREA URNS AUTO: 1 /HPF (ref 0–5)
WBC #/AREA URNS AUTO: 6 /HPF (ref 0–5)
WBC #/AREA URNS AUTO: 95 /HPF (ref 0–5)
WBC #/AREA URNS AUTO: >100 /HPF (ref 0–5)
WBC CLUMPS UR QL AUTO: ABNORMAL
WBC CLUMPS UR QL AUTO: ABNORMAL

## 2017-01-01 PROCEDURE — 97530 THERAPEUTIC ACTIVITIES: CPT

## 2017-01-01 PROCEDURE — 94761 N-INVAS EAR/PLS OXIMETRY MLT: CPT

## 2017-01-01 PROCEDURE — 25000003 PHARM REV CODE 250: Performed by: STUDENT IN AN ORGANIZED HEALTH CARE EDUCATION/TRAINING PROGRAM

## 2017-01-01 PROCEDURE — 83735 ASSAY OF MAGNESIUM: CPT

## 2017-01-01 PROCEDURE — 20600001 HC STEP DOWN PRIVATE ROOM

## 2017-01-01 PROCEDURE — 84100 ASSAY OF PHOSPHORUS: CPT

## 2017-01-01 PROCEDURE — 93005 ELECTROCARDIOGRAM TRACING: CPT

## 2017-01-01 PROCEDURE — 36415 COLL VENOUS BLD VENIPUNCTURE: CPT

## 2017-01-01 PROCEDURE — 25000003 PHARM REV CODE 250: Performed by: PHYSICIAN ASSISTANT

## 2017-01-01 PROCEDURE — 82533 TOTAL CORTISOL: CPT

## 2017-01-01 PROCEDURE — 80069 RENAL FUNCTION PANEL: CPT

## 2017-01-01 PROCEDURE — 85610 PROTHROMBIN TIME: CPT

## 2017-01-01 PROCEDURE — 87040 BLOOD CULTURE FOR BACTERIA: CPT | Mod: 59

## 2017-01-01 PROCEDURE — 83605 ASSAY OF LACTIC ACID: CPT

## 2017-01-01 PROCEDURE — 51798 US URINE CAPACITY MEASURE: CPT

## 2017-01-01 PROCEDURE — 99285 EMERGENCY DEPT VISIT HI MDM: CPT | Mod: ,,, | Performed by: EMERGENCY MEDICINE

## 2017-01-01 PROCEDURE — 25000003 PHARM REV CODE 250: Performed by: INTERNAL MEDICINE

## 2017-01-01 PROCEDURE — G0378 HOSPITAL OBSERVATION PER HR: HCPCS

## 2017-01-01 PROCEDURE — 63600175 PHARM REV CODE 636 W HCPCS: Performed by: PHYSICIAN ASSISTANT

## 2017-01-01 PROCEDURE — 85025 COMPLETE CBC W/AUTO DIFF WBC: CPT

## 2017-01-01 PROCEDURE — D9220A PRA ANESTHESIA: Mod: ANES,,, | Performed by: ANESTHESIOLOGY

## 2017-01-01 PROCEDURE — 99499 UNLISTED E&M SERVICE: CPT | Mod: S$GLB,,,

## 2017-01-01 PROCEDURE — 84443 ASSAY THYROID STIM HORMONE: CPT

## 2017-01-01 PROCEDURE — 25000003 PHARM REV CODE 250: Performed by: HOSPITALIST

## 2017-01-01 PROCEDURE — 99285 EMERGENCY DEPT VISIT HI MDM: CPT | Mod: 25

## 2017-01-01 PROCEDURE — 99214 OFFICE O/P EST MOD 30 MIN: CPT | Mod: S$GLB,,,

## 2017-01-01 PROCEDURE — 99238 HOSP IP/OBS DSCHRG MGMT 30/<: CPT | Mod: GC,,, | Performed by: HOSPITALIST

## 2017-01-01 PROCEDURE — 63600175 PHARM REV CODE 636 W HCPCS: Performed by: HOSPITALIST

## 2017-01-01 PROCEDURE — 99223 1ST HOSP IP/OBS HIGH 75: CPT | Mod: ,,, | Performed by: HOSPITALIST

## 2017-01-01 PROCEDURE — 25000003 PHARM REV CODE 250: Performed by: SURGERY

## 2017-01-01 PROCEDURE — 37000009 HC ANESTHESIA EA ADD 15 MINS: Performed by: ORTHOPAEDIC SURGERY

## 2017-01-01 PROCEDURE — 99499 UNLISTED E&M SERVICE: CPT | Mod: S$GLB,,, | Performed by: PHYSICIAN ASSISTANT

## 2017-01-01 PROCEDURE — 99232 SBSQ HOSP IP/OBS MODERATE 35: CPT | Mod: ,,, | Performed by: INTERNAL MEDICINE

## 2017-01-01 PROCEDURE — 99024 POSTOP FOLLOW-UP VISIT: CPT | Mod: S$GLB,,, | Performed by: PHYSICIAN ASSISTANT

## 2017-01-01 PROCEDURE — 85520 HEPARIN ASSAY: CPT | Mod: 91

## 2017-01-01 PROCEDURE — 27000221 HC OXYGEN, UP TO 24 HOURS

## 2017-01-01 PROCEDURE — 84300 ASSAY OF URINE SODIUM: CPT

## 2017-01-01 PROCEDURE — 84484 ASSAY OF TROPONIN QUANT: CPT

## 2017-01-01 PROCEDURE — 99223 1ST HOSP IP/OBS HIGH 75: CPT | Mod: ,,, | Performed by: CLINICAL NURSE SPECIALIST

## 2017-01-01 PROCEDURE — 37799 UNLISTED PX VASCULAR SURGERY: CPT

## 2017-01-01 PROCEDURE — 63600175 PHARM REV CODE 636 W HCPCS: Performed by: STUDENT IN AN ORGANIZED HEALTH CARE EDUCATION/TRAINING PROGRAM

## 2017-01-01 PROCEDURE — 99285 EMERGENCY DEPT VISIT HI MDM: CPT

## 2017-01-01 PROCEDURE — G8978 MOBILITY CURRENT STATUS: HCPCS | Mod: CL

## 2017-01-01 PROCEDURE — 06H03DZ INSERTION OF INTRALUMINAL DEVICE INTO INFERIOR VENA CAVA, PERCUTANEOUS APPROACH: ICD-10-PCS | Performed by: ORTHOPAEDIC SURGERY

## 2017-01-01 PROCEDURE — 96361 HYDRATE IV INFUSION ADD-ON: CPT

## 2017-01-01 PROCEDURE — 80177 DRUG SCRN QUAN LEVETIRACETAM: CPT

## 2017-01-01 PROCEDURE — 97535 SELF CARE MNGMENT TRAINING: CPT

## 2017-01-01 PROCEDURE — 80184 ASSAY OF PHENOBARBITAL: CPT

## 2017-01-01 PROCEDURE — 83880 ASSAY OF NATRIURETIC PEPTIDE: CPT

## 2017-01-01 PROCEDURE — 36000707: Performed by: SURGERY

## 2017-01-01 PROCEDURE — 93010 ELECTROCARDIOGRAM REPORT: CPT | Mod: ,,, | Performed by: INTERNAL MEDICINE

## 2017-01-01 PROCEDURE — 87086 URINE CULTURE/COLONY COUNT: CPT

## 2017-01-01 PROCEDURE — 92610 EVALUATE SWALLOWING FUNCTION: CPT

## 2017-01-01 PROCEDURE — 87077 CULTURE AEROBIC IDENTIFY: CPT

## 2017-01-01 PROCEDURE — 36000711: Performed by: ORTHOPAEDIC SURGERY

## 2017-01-01 PROCEDURE — 85025 COMPLETE CBC W/AUTO DIFF WBC: CPT | Mod: 91

## 2017-01-01 PROCEDURE — 87088 URINE BACTERIA CULTURE: CPT

## 2017-01-01 PROCEDURE — 80053 COMPREHEN METABOLIC PANEL: CPT

## 2017-01-01 PROCEDURE — 99225 PR SUBSEQUENT OBSERVATION CARE,LEVEL II: CPT | Mod: GC,,, | Performed by: HOSPITALIST

## 2017-01-01 PROCEDURE — G8987 SELF CARE CURRENT STATUS: HCPCS | Mod: CM

## 2017-01-01 PROCEDURE — 99233 SBSQ HOSP IP/OBS HIGH 50: CPT | Mod: ,,, | Performed by: INTERNAL MEDICINE

## 2017-01-01 PROCEDURE — 97165 OT EVAL LOW COMPLEX 30 MIN: CPT

## 2017-01-01 PROCEDURE — 1160F RVW MEDS BY RX/DR IN RCRD: CPT | Mod: S$GLB,,,

## 2017-01-01 PROCEDURE — 87186 SC STD MICRODIL/AGAR DIL: CPT

## 2017-01-01 PROCEDURE — C1769 GUIDE WIRE: HCPCS | Performed by: ORTHOPAEDIC SURGERY

## 2017-01-01 PROCEDURE — G8978 MOBILITY CURRENT STATUS: HCPCS | Mod: CK

## 2017-01-01 PROCEDURE — 27201037 HC PRESSURE MONITORING SET UP

## 2017-01-01 PROCEDURE — 36600 WITHDRAWAL OF ARTERIAL BLOOD: CPT

## 2017-01-01 PROCEDURE — 96367 TX/PROPH/DG ADDL SEQ IV INF: CPT

## 2017-01-01 PROCEDURE — 99900026 HC AIRWAY MAINTENANCE (STAT)

## 2017-01-01 PROCEDURE — 82803 BLOOD GASES ANY COMBINATION: CPT

## 2017-01-01 PROCEDURE — 97116 GAIT TRAINING THERAPY: CPT

## 2017-01-01 PROCEDURE — 25000003 PHARM REV CODE 250: Performed by: NURSE ANESTHETIST, CERTIFIED REGISTERED

## 2017-01-01 PROCEDURE — 99232 SBSQ HOSP IP/OBS MODERATE 35: CPT | Mod: GC,,, | Performed by: HOSPITALIST

## 2017-01-01 PROCEDURE — 27100171 HC OXYGEN HIGH FLOW UP TO 24 HOURS

## 2017-01-01 PROCEDURE — 81001 URINALYSIS AUTO W/SCOPE: CPT

## 2017-01-01 PROCEDURE — 82570 ASSAY OF URINE CREATININE: CPT

## 2017-01-01 PROCEDURE — 37000008 HC ANESTHESIA 1ST 15 MINUTES: Performed by: ORTHOPAEDIC SURGERY

## 2017-01-01 PROCEDURE — 36620 INSERTION CATHETER ARTERY: CPT | Mod: 59,,, | Performed by: ANESTHESIOLOGY

## 2017-01-01 PROCEDURE — D9220A PRA ANESTHESIA: Mod: CRNA,,, | Performed by: NURSE ANESTHETIST, CERTIFIED REGISTERED

## 2017-01-01 PROCEDURE — 63600175 PHARM REV CODE 636 W HCPCS: Performed by: INTERNAL MEDICINE

## 2017-01-01 PROCEDURE — 63600175 PHARM REV CODE 636 W HCPCS: Performed by: NURSE ANESTHETIST, CERTIFIED REGISTERED

## 2017-01-01 PROCEDURE — 85520 HEPARIN ASSAY: CPT

## 2017-01-01 PROCEDURE — 93306 TTE W/DOPPLER COMPLETE: CPT

## 2017-01-01 PROCEDURE — G8997 SWALLOW GOAL STATUS: HCPCS | Mod: CH

## 2017-01-01 PROCEDURE — 99223 1ST HOSP IP/OBS HIGH 75: CPT | Mod: GC,,, | Performed by: INTERNAL MEDICINE

## 2017-01-01 PROCEDURE — 96365 THER/PROPH/DIAG IV INF INIT: CPT

## 2017-01-01 PROCEDURE — G8979 MOBILITY GOAL STATUS: HCPCS | Mod: CL

## 2017-01-01 PROCEDURE — 11000001 HC ACUTE MED/SURG PRIVATE ROOM

## 2017-01-01 PROCEDURE — 36000706: Performed by: SURGERY

## 2017-01-01 PROCEDURE — 99223 1ST HOSP IP/OBS HIGH 75: CPT | Mod: ,,, | Performed by: SURGERY

## 2017-01-01 PROCEDURE — 25000003 PHARM REV CODE 250: Performed by: ORTHOPAEDIC SURGERY

## 2017-01-01 PROCEDURE — 97162 PT EVAL MOD COMPLEX 30 MIN: CPT

## 2017-01-01 PROCEDURE — 84466 ASSAY OF TRANSFERRIN: CPT

## 2017-01-01 PROCEDURE — 84134 ASSAY OF PREALBUMIN: CPT

## 2017-01-01 PROCEDURE — 97110 THERAPEUTIC EXERCISES: CPT

## 2017-01-01 PROCEDURE — 99217 PR OBSERVATION CARE DISCHARGE: CPT | Mod: GC,,, | Performed by: HOSPITALIST

## 2017-01-01 PROCEDURE — G8988 SELF CARE GOAL STATUS: HCPCS | Mod: CJ

## 2017-01-01 PROCEDURE — 85730 THROMBOPLASTIN TIME PARTIAL: CPT

## 2017-01-01 PROCEDURE — G8980 MOBILITY D/C STATUS: HCPCS | Mod: CL

## 2017-01-01 PROCEDURE — 80048 BASIC METABOLIC PNL TOTAL CA: CPT

## 2017-01-01 PROCEDURE — G8996 SWALLOW CURRENT STATUS: HCPCS | Mod: CH

## 2017-01-01 PROCEDURE — C1769 GUIDE WIRE: HCPCS | Performed by: SURGERY

## 2017-01-01 PROCEDURE — 99223 1ST HOSP IP/OBS HIGH 75: CPT | Mod: 57,,, | Performed by: ORTHOPAEDIC SURGERY

## 2017-01-01 PROCEDURE — G8989 SELF CARE D/C STATUS: HCPCS | Mod: CM

## 2017-01-01 PROCEDURE — 84540 ASSAY OF URINE/UREA-N: CPT

## 2017-01-01 PROCEDURE — 96365 THER/PROPH/DIAG IV INF INIT: CPT | Mod: 59

## 2017-01-01 PROCEDURE — 99220 PR INITIAL OBSERVATION CARE,LEVL III: CPT | Mod: GC,,, | Performed by: HOSPITALIST

## 2017-01-01 PROCEDURE — 84439 ASSAY OF FREE THYROXINE: CPT

## 2017-01-01 PROCEDURE — 86580 TB INTRADERMAL TEST: CPT | Performed by: STUDENT IN AN ORGANIZED HEALTH CARE EDUCATION/TRAINING PROGRAM

## 2017-01-01 PROCEDURE — 99999 PR PBB SHADOW E&M-EST. PATIENT-LVL III: CPT | Mod: PBBFAC,,,

## 2017-01-01 PROCEDURE — 97116 GAIT TRAINING THERAPY: CPT | Mod: 59

## 2017-01-01 PROCEDURE — 20000000 HC ICU ROOM

## 2017-01-01 PROCEDURE — 82306 VITAMIN D 25 HYDROXY: CPT

## 2017-01-01 PROCEDURE — C1880 VENA CAVA FILTER: HCPCS | Performed by: SURGERY

## 2017-01-01 PROCEDURE — G8998 SWALLOW D/C STATUS: HCPCS | Mod: CH

## 2017-01-01 PROCEDURE — C1713 ANCHOR/SCREW BN/BN,TIS/BN: HCPCS | Performed by: ORTHOPAEDIC SURGERY

## 2017-01-01 PROCEDURE — 86580 TB INTRADERMAL TEST: CPT | Performed by: INTERNAL MEDICINE

## 2017-01-01 PROCEDURE — C1894 INTRO/SHEATH, NON-LASER: HCPCS | Performed by: SURGERY

## 2017-01-01 PROCEDURE — 27201423 OPTIME MED/SURG SUP & DEVICES STERILE SUPPLY: Performed by: ORTHOPAEDIC SURGERY

## 2017-01-01 PROCEDURE — 84145 PROCALCITONIN (PCT): CPT

## 2017-01-01 PROCEDURE — 25500020 PHARM REV CODE 255: Performed by: SURGERY

## 2017-01-01 PROCEDURE — 99223 1ST HOSP IP/OBS HIGH 75: CPT | Mod: ,,, | Performed by: INTERNAL MEDICINE

## 2017-01-01 PROCEDURE — G8979 MOBILITY GOAL STATUS: HCPCS | Mod: CK

## 2017-01-01 PROCEDURE — 0QH636Z INSERTION OF INTRAMEDULLARY INTERNAL FIXATION DEVICE INTO RIGHT UPPER FEMUR, PERCUTANEOUS APPROACH: ICD-10-PCS | Performed by: SURGERY

## 2017-01-01 PROCEDURE — 1157F ADVNC CARE PLAN IN RCRD: CPT | Mod: S$GLB,,,

## 2017-01-01 PROCEDURE — 36000710: Performed by: ORTHOPAEDIC SURGERY

## 2017-01-01 PROCEDURE — 51702 INSERT TEMP BLADDER CATH: CPT

## 2017-01-01 PROCEDURE — 63600175 PHARM REV CODE 636 W HCPCS

## 2017-01-01 PROCEDURE — 86920 COMPATIBILITY TEST SPIN: CPT

## 2017-01-01 PROCEDURE — 80202 ASSAY OF VANCOMYCIN: CPT

## 2017-01-01 PROCEDURE — 83690 ASSAY OF LIPASE: CPT

## 2017-01-01 PROCEDURE — 93010 ELECTROCARDIOGRAM REPORT: CPT | Mod: S$GLB,,, | Performed by: INTERNAL MEDICINE

## 2017-01-01 PROCEDURE — 1126F AMNT PAIN NOTED NONE PRSNT: CPT | Mod: S$GLB,,,

## 2017-01-01 PROCEDURE — 27800505 HC CATH, RADIAL ARTERY KIT: Performed by: NURSE ANESTHETIST, CERTIFIED REGISTERED

## 2017-01-01 PROCEDURE — 99239 HOSP IP/OBS DSCHRG MGMT >30: CPT | Mod: ,,, | Performed by: INTERNAL MEDICINE

## 2017-01-01 PROCEDURE — G8978 MOBILITY CURRENT STATUS: HCPCS | Mod: CM

## 2017-01-01 PROCEDURE — 86850 RBC ANTIBODY SCREEN: CPT

## 2017-01-01 PROCEDURE — 97161 PT EVAL LOW COMPLEX 20 MIN: CPT

## 2017-01-01 PROCEDURE — G8980 MOBILITY D/C STATUS: HCPCS | Mod: CJ

## 2017-01-01 PROCEDURE — 93306 TTE W/DOPPLER COMPLETE: CPT | Mod: 26,,, | Performed by: INTERNAL MEDICINE

## 2017-01-01 PROCEDURE — 94002 VENT MGMT INPAT INIT DAY: CPT

## 2017-01-01 PROCEDURE — 27245 TREAT THIGH FRACTURE: CPT | Mod: RT,,, | Performed by: ORTHOPAEDIC SURGERY

## 2017-01-01 PROCEDURE — 86900 BLOOD TYPING SEROLOGIC ABO: CPT

## 2017-01-01 PROCEDURE — 96366 THER/PROPH/DIAG IV INF ADDON: CPT | Mod: 59

## 2017-01-01 PROCEDURE — 64447 NJX AA&/STRD FEMORAL NRV IMG: CPT | Mod: 59,RT,, | Performed by: ANESTHESIOLOGY

## 2017-01-01 PROCEDURE — 76942 ECHO GUIDE FOR BIOPSY: CPT | Performed by: ANESTHESIOLOGY

## 2017-01-01 PROCEDURE — 37000009 HC ANESTHESIA EA ADD 15 MINS: Performed by: SURGERY

## 2017-01-01 PROCEDURE — 27100025 HC TUBING, SET FLUID WARMER: Performed by: NURSE ANESTHETIST, CERTIFIED REGISTERED

## 2017-01-01 PROCEDURE — 99231 SBSQ HOSP IP/OBS SF/LOW 25: CPT | Mod: ,,, | Performed by: ANESTHESIOLOGY

## 2017-01-01 PROCEDURE — 1159F MED LIST DOCD IN RCRD: CPT | Mod: S$GLB,,,

## 2017-01-01 PROCEDURE — 97802 MEDICAL NUTRITION INDIV IN: CPT

## 2017-01-01 PROCEDURE — 96368 THER/DIAG CONCURRENT INF: CPT | Mod: 59

## 2017-01-01 PROCEDURE — 99223 1ST HOSP IP/OBS HIGH 75: CPT | Mod: AI,GC,, | Performed by: HOSPITALIST

## 2017-01-01 PROCEDURE — 99999 PR PBB SHADOW E&M-EST. PATIENT-LVL II: CPT | Mod: PBBFAC,,, | Performed by: PHYSICIAN ASSISTANT

## 2017-01-01 PROCEDURE — 25500020 PHARM REV CODE 255: Performed by: HOSPITALIST

## 2017-01-01 PROCEDURE — 99291 CRITICAL CARE FIRST HOUR: CPT | Mod: ,,, | Performed by: EMERGENCY MEDICINE

## 2017-01-01 PROCEDURE — 99900035 HC TECH TIME PER 15 MIN (STAT)

## 2017-01-01 PROCEDURE — 87077 CULTURE AEROBIC IDENTIFY: CPT | Mod: 59

## 2017-01-01 PROCEDURE — 37000008 HC ANESTHESIA 1ST 15 MINUTES: Performed by: SURGERY

## 2017-01-01 PROCEDURE — 99224 PR SUBSEQUENT OBSERVATION CARE,LEVEL I: CPT | Mod: GC,,, | Performed by: HOSPITALIST

## 2017-01-01 DEVICE — KIT DELIVERY VENA CAVA FILTER: Type: IMPLANTABLE DEVICE | Site: VENA CAVA | Status: FUNCTIONAL

## 2017-01-01 DEVICE — BLADE TFNA HELICAL 100MM ST: Type: IMPLANTABLE DEVICE | Site: FEMUR | Status: FUNCTIONAL

## 2017-01-01 RX ORDER — HALOPERIDOL 5 MG/ML
1 INJECTION INTRAMUSCULAR EVERY 6 HOURS PRN
Status: DISCONTINUED | OUTPATIENT
Start: 2017-01-01 | End: 2017-01-01 | Stop reason: HOSPADM

## 2017-01-01 RX ORDER — MEMANTINE HYDROCHLORIDE 10 MG/1
10 TABLET ORAL DAILY
Status: DISCONTINUED | OUTPATIENT
Start: 2017-01-01 | End: 2017-01-01 | Stop reason: HOSPADM

## 2017-01-01 RX ORDER — CLINDAMYCIN PHOSPHATE 900 MG/50ML
900 INJECTION, SOLUTION INTRAVENOUS
Status: COMPLETED | OUTPATIENT
Start: 2017-01-01 | End: 2017-01-01

## 2017-01-01 RX ORDER — PHENOBARBITAL 30 MG/1
32.4 TABLET ORAL 3 TIMES DAILY
Status: DISCONTINUED | OUTPATIENT
Start: 2017-01-01 | End: 2017-01-01 | Stop reason: HOSPADM

## 2017-01-01 RX ORDER — LEVETIRACETAM 750 MG/1
750 TABLET ORAL 2 TIMES DAILY
Status: DISCONTINUED | OUTPATIENT
Start: 2017-01-01 | End: 2017-01-01 | Stop reason: HOSPADM

## 2017-01-01 RX ORDER — PROPOFOL 10 MG/ML
VIAL (ML) INTRAVENOUS CONTINUOUS PRN
Status: DISCONTINUED | OUTPATIENT
Start: 2017-01-01 | End: 2017-01-01

## 2017-01-01 RX ORDER — UREA 10 %
1 LOTION (ML) TOPICAL DAILY
Status: ON HOLD | COMMUNITY
End: 2017-01-01 | Stop reason: HOSPADM

## 2017-01-01 RX ORDER — AMOXICILLIN 250 MG
1 CAPSULE ORAL 2 TIMES DAILY PRN
Status: ON HOLD | COMMUNITY
Start: 2017-01-01 | End: 2017-01-01 | Stop reason: HOSPADM

## 2017-01-01 RX ORDER — ERGOCALCIFEROL 1.25 MG/1
50000 CAPSULE ORAL
Status: ON HOLD
Start: 2017-01-01 | End: 2017-01-01 | Stop reason: HOSPADM

## 2017-01-01 RX ORDER — SODIUM CHLORIDE 0.9 % (FLUSH) 0.9 %
3 SYRINGE (ML) INJECTION EVERY 8 HOURS
Status: DISCONTINUED | OUTPATIENT
Start: 2017-01-01 | End: 2017-01-01

## 2017-01-01 RX ORDER — FINASTERIDE 5 MG/1
5 TABLET, FILM COATED ORAL DAILY
Status: ON HOLD
Start: 2017-01-01 | End: 2017-01-01 | Stop reason: HOSPADM

## 2017-01-01 RX ORDER — TAMSULOSIN HYDROCHLORIDE 0.4 MG/1
0.4 CAPSULE ORAL DAILY
Status: DISCONTINUED | OUTPATIENT
Start: 2017-01-01 | End: 2017-01-01 | Stop reason: HOSPADM

## 2017-01-01 RX ORDER — SULFAMETHOXAZOLE AND TRIMETHOPRIM 800; 160 MG/1; MG/1
1 TABLET ORAL 2 TIMES DAILY
Status: ON HOLD | COMMUNITY
Start: 2017-01-01 | End: 2017-01-01 | Stop reason: HOSPADM

## 2017-01-01 RX ORDER — ASPIRIN 325 MG
325 TABLET, DELAYED RELEASE (ENTERIC COATED) ORAL DAILY
Status: DISCONTINUED | OUTPATIENT
Start: 2017-01-01 | End: 2017-01-01

## 2017-01-01 RX ORDER — CEPHALEXIN 250 MG/1
500 CAPSULE ORAL EVERY 12 HOURS
Status: COMPLETED | OUTPATIENT
Start: 2017-01-01 | End: 2017-01-01

## 2017-01-01 RX ORDER — SODIUM,POTASSIUM PHOSPHATES 280-250MG
2 POWDER IN PACKET (EA) ORAL
Status: DISCONTINUED | OUTPATIENT
Start: 2017-01-01 | End: 2017-01-01

## 2017-01-01 RX ORDER — HEPARIN SODIUM,PORCINE/D5W 25000/250
17 INTRAVENOUS SOLUTION INTRAVENOUS CONTINUOUS
Status: DISCONTINUED | OUTPATIENT
Start: 2017-01-01 | End: 2017-01-01

## 2017-01-01 RX ORDER — MORPHINE SULFATE 20 MG/ML
5 SOLUTION ORAL
Status: DISCONTINUED | OUTPATIENT
Start: 2017-01-01 | End: 2017-01-01 | Stop reason: HOSPADM

## 2017-01-01 RX ORDER — FENTANYL CITRATE 50 UG/ML
50 INJECTION, SOLUTION INTRAMUSCULAR; INTRAVENOUS ONCE
Status: COMPLETED | OUTPATIENT
Start: 2017-01-01 | End: 2017-01-01

## 2017-01-01 RX ORDER — LEVETIRACETAM 750 MG/1
TABLET ORAL
Qty: 180 TABLET | Refills: 0 | OUTPATIENT
Start: 2017-01-01

## 2017-01-01 RX ORDER — CALCIUM CARBONATE 200(500)MG
500 TABLET,CHEWABLE ORAL DAILY PRN
Status: DISCONTINUED | OUTPATIENT
Start: 2017-01-01 | End: 2017-01-01 | Stop reason: HOSPADM

## 2017-01-01 RX ORDER — TAMSULOSIN HYDROCHLORIDE 0.4 MG/1
0.4 CAPSULE ORAL DAILY
Qty: 90 CAPSULE | Refills: 3 | Status: ON HOLD | OUTPATIENT
Start: 2017-01-01 | End: 2017-01-01 | Stop reason: HOSPADM

## 2017-01-01 RX ORDER — HEPARIN SODIUM 1000 [USP'U]/ML
INJECTION, SOLUTION INTRAVENOUS; SUBCUTANEOUS
Status: DISCONTINUED | OUTPATIENT
Start: 2017-01-01 | End: 2017-01-01 | Stop reason: HOSPADM

## 2017-01-01 RX ORDER — LIDOCAINE HCL/PF 100 MG/5ML
SYRINGE (ML) INTRAVENOUS
Status: DISCONTINUED | OUTPATIENT
Start: 2017-01-01 | End: 2017-01-01

## 2017-01-01 RX ORDER — LIDOCAINE HYDROCHLORIDE 10 MG/ML
INJECTION, SOLUTION EPIDURAL; INFILTRATION; INTRACAUDAL; PERINEURAL
Status: DISCONTINUED | OUTPATIENT
Start: 2017-01-01 | End: 2017-01-01 | Stop reason: HOSPADM

## 2017-01-01 RX ORDER — CEPHALEXIN 500 MG/1
500 CAPSULE ORAL EVERY 12 HOURS
Qty: 8 CAPSULE | Refills: 0 | Status: CANCELLED
Start: 2017-01-01

## 2017-01-01 RX ORDER — METOPROLOL TARTRATE 1 MG/ML
5 INJECTION, SOLUTION INTRAVENOUS ONCE
Status: COMPLETED | OUTPATIENT
Start: 2017-01-01 | End: 2017-01-01

## 2017-01-01 RX ORDER — BACITRACIN ZINC 500 UNIT/G
OINTMENT (GRAM) TOPICAL
Status: DISCONTINUED | OUTPATIENT
Start: 2017-01-01 | End: 2017-01-01 | Stop reason: HOSPADM

## 2017-01-01 RX ORDER — MEMANTINE HYDROCHLORIDE 10 MG/1
10 TABLET ORAL 2 TIMES DAILY
Status: ON HOLD
Start: 2017-01-01 | End: 2017-01-01 | Stop reason: HOSPADM

## 2017-01-01 RX ORDER — ATORVASTATIN CALCIUM 20 MG/1
40 TABLET, FILM COATED ORAL DAILY
Status: DISCONTINUED | OUTPATIENT
Start: 2017-01-01 | End: 2017-01-01 | Stop reason: HOSPADM

## 2017-01-01 RX ORDER — SODIUM CHLORIDE 9 MG/ML
INJECTION, SOLUTION INTRAVENOUS CONTINUOUS
Status: ACTIVE | OUTPATIENT
Start: 2017-01-01 | End: 2017-01-01

## 2017-01-01 RX ORDER — MORPHINE SULFATE 2 MG/ML
4 INJECTION, SOLUTION INTRAMUSCULAR; INTRAVENOUS EVERY 4 HOURS PRN
Status: DISCONTINUED | OUTPATIENT
Start: 2017-01-01 | End: 2017-01-01 | Stop reason: HOSPADM

## 2017-01-01 RX ORDER — CEFEPIME HYDROCHLORIDE 2 G/50ML
2 INJECTION, SOLUTION INTRAVENOUS
Status: COMPLETED | OUTPATIENT
Start: 2017-01-01 | End: 2017-01-01

## 2017-01-01 RX ORDER — PHENOBARBITAL 32.4 MG/1
32.4 TABLET ORAL 3 TIMES DAILY
Qty: 270 TABLET | Refills: 1 | Status: ON HOLD | OUTPATIENT
Start: 2017-01-01 | End: 2017-01-01

## 2017-01-01 RX ORDER — MEMANTINE HYDROCHLORIDE 10 MG/1
10 TABLET ORAL 2 TIMES DAILY
Status: DISCONTINUED | OUTPATIENT
Start: 2017-01-01 | End: 2017-01-01 | Stop reason: HOSPADM

## 2017-01-01 RX ORDER — GABAPENTIN 800 MG/1
800 TABLET ORAL 3 TIMES DAILY
Qty: 270 TABLET | Refills: 1 | Status: ON HOLD | OUTPATIENT
Start: 2017-01-01 | End: 2017-01-01 | Stop reason: HOSPADM

## 2017-01-01 RX ORDER — AMOXICILLIN 250 MG
1 CAPSULE ORAL 2 TIMES DAILY PRN
Status: DISCONTINUED | OUTPATIENT
Start: 2017-01-01 | End: 2017-01-01 | Stop reason: HOSPADM

## 2017-01-01 RX ORDER — GABAPENTIN 300 MG/1
600 CAPSULE ORAL 3 TIMES DAILY
Status: DISCONTINUED | OUTPATIENT
Start: 2017-01-01 | End: 2017-01-01 | Stop reason: HOSPADM

## 2017-01-01 RX ORDER — LEVOTHYROXINE SODIUM 75 UG/1
150 TABLET ORAL
Status: DISCONTINUED | OUTPATIENT
Start: 2017-01-01 | End: 2017-01-01 | Stop reason: HOSPADM

## 2017-01-01 RX ORDER — NICARDIPINE HYDROCHLORIDE 0.2 MG/ML
5 INJECTION INTRAVENOUS CONTINUOUS
Status: DISCONTINUED | OUTPATIENT
Start: 2017-01-01 | End: 2017-01-01

## 2017-01-01 RX ORDER — CARVEDILOL 12.5 MG/1
12.5 TABLET ORAL 2 TIMES DAILY
Status: DISCONTINUED | OUTPATIENT
Start: 2017-01-01 | End: 2017-01-01 | Stop reason: HOSPADM

## 2017-01-01 RX ORDER — ETOMIDATE 2 MG/ML
INJECTION INTRAVENOUS
Status: DISCONTINUED | OUTPATIENT
Start: 2017-01-01 | End: 2017-01-01

## 2017-01-01 RX ORDER — IBUPROFEN 200 MG
24 TABLET ORAL
Status: DISCONTINUED | OUTPATIENT
Start: 2017-01-01 | End: 2017-01-01 | Stop reason: HOSPADM

## 2017-01-01 RX ORDER — HYDRALAZINE HYDROCHLORIDE 25 MG/1
25 TABLET, FILM COATED ORAL EVERY 6 HOURS
Status: DISCONTINUED | OUTPATIENT
Start: 2017-01-01 | End: 2017-01-01

## 2017-01-01 RX ORDER — VANCOMYCIN HCL IN 5 % DEXTROSE 1.25 G/25
15 PLASTIC BAG, INJECTION (ML) INTRAVENOUS
Status: COMPLETED | OUTPATIENT
Start: 2017-01-01 | End: 2017-01-01

## 2017-01-01 RX ORDER — OLANZAPINE 10 MG/2ML
2.5 INJECTION, POWDER, FOR SOLUTION INTRAMUSCULAR
Status: DISCONTINUED | OUTPATIENT
Start: 2017-01-01 | End: 2017-01-01

## 2017-01-01 RX ORDER — AMOXICILLIN 250 MG
2 CAPSULE ORAL 2 TIMES DAILY PRN
Status: DISCONTINUED | OUTPATIENT
Start: 2017-01-01 | End: 2017-01-01

## 2017-01-01 RX ORDER — METOPROLOL TARTRATE 25 MG/1
25 TABLET, FILM COATED ORAL 2 TIMES DAILY
Status: DISCONTINUED | OUTPATIENT
Start: 2017-01-01 | End: 2017-01-01

## 2017-01-01 RX ORDER — QUETIAPINE FUMARATE 50 MG/1
50 TABLET, EXTENDED RELEASE ORAL ONCE AS NEEDED
Status: DISPENSED | OUTPATIENT
Start: 2017-01-01 | End: 2017-01-01

## 2017-01-01 RX ORDER — METOPROLOL SUCCINATE 25 MG/1
25 TABLET, EXTENDED RELEASE ORAL DAILY
Status: DISCONTINUED | OUTPATIENT
Start: 2017-01-01 | End: 2017-01-01

## 2017-01-01 RX ORDER — CALCIUM CARBONATE 500(1250)
1000 TABLET ORAL DAILY
Status: DISCONTINUED | OUTPATIENT
Start: 2017-01-01 | End: 2017-01-01 | Stop reason: HOSPADM

## 2017-01-01 RX ORDER — GABAPENTIN 800 MG/1
TABLET ORAL
Qty: 270 TABLET | Refills: 0 | OUTPATIENT
Start: 2017-01-01

## 2017-01-01 RX ORDER — IPRATROPIUM BROMIDE AND ALBUTEROL SULFATE 2.5; .5 MG/3ML; MG/3ML
3 SOLUTION RESPIRATORY (INHALATION) EVERY 4 HOURS PRN
Status: DISCONTINUED | OUTPATIENT
Start: 2017-01-01 | End: 2017-01-01 | Stop reason: HOSPADM

## 2017-01-01 RX ORDER — ERGOCALCIFEROL 1.25 MG/1
50000 CAPSULE ORAL
Status: DISCONTINUED | OUTPATIENT
Start: 2017-01-01 | End: 2017-01-01 | Stop reason: HOSPADM

## 2017-01-01 RX ORDER — OXYCODONE HYDROCHLORIDE 5 MG/1
5 TABLET ORAL EVERY 4 HOURS PRN
Status: DISCONTINUED | OUTPATIENT
Start: 2017-01-01 | End: 2017-01-01 | Stop reason: HOSPADM

## 2017-01-01 RX ORDER — TAMSULOSIN HYDROCHLORIDE 0.4 MG/1
0.4 CAPSULE ORAL DAILY
Status: ON HOLD
Start: 2017-01-01 | End: 2017-01-01 | Stop reason: HOSPADM

## 2017-01-01 RX ORDER — IODIXANOL 320 MG/ML
INJECTION, SOLUTION INTRAVASCULAR
Status: DISCONTINUED | OUTPATIENT
Start: 2017-01-01 | End: 2017-01-01 | Stop reason: HOSPADM

## 2017-01-01 RX ORDER — SCOLOPAMINE TRANSDERMAL SYSTEM 1 MG/1
1 PATCH, EXTENDED RELEASE TRANSDERMAL ONCE
Status: DISCONTINUED | OUTPATIENT
Start: 2017-01-01 | End: 2017-01-01 | Stop reason: HOSPADM

## 2017-01-01 RX ORDER — ACETAMINOPHEN 325 MG/1
650 TABLET ORAL EVERY 6 HOURS PRN
Status: DISCONTINUED | OUTPATIENT
Start: 2017-01-01 | End: 2017-01-01 | Stop reason: HOSPADM

## 2017-01-01 RX ORDER — ROPIVACAINE HYDROCHLORIDE 2 MG/ML
8 INJECTION, SOLUTION EPIDURAL; INFILTRATION; PERINEURAL CONTINUOUS
Status: DISCONTINUED | OUTPATIENT
Start: 2017-01-01 | End: 2017-01-01

## 2017-01-01 RX ORDER — GABAPENTIN 400 MG/1
800 CAPSULE ORAL 3 TIMES DAILY
Status: DISCONTINUED | OUTPATIENT
Start: 2017-01-01 | End: 2017-01-01 | Stop reason: HOSPADM

## 2017-01-01 RX ORDER — CARVEDILOL 25 MG/1
25 TABLET ORAL 2 TIMES DAILY
Status: DISCONTINUED | OUTPATIENT
Start: 2017-01-01 | End: 2017-01-01

## 2017-01-01 RX ORDER — CEPHALEXIN 500 MG/1
500 CAPSULE ORAL EVERY 12 HOURS
Qty: 8 CAPSULE | Refills: 0 | Status: ON HOLD
Start: 2017-01-01 | End: 2017-01-01 | Stop reason: HOSPADM

## 2017-01-01 RX ORDER — POTASSIUM CHLORIDE 20 MEQ/15ML
40 SOLUTION ORAL
Status: DISCONTINUED | OUTPATIENT
Start: 2017-01-01 | End: 2017-01-01

## 2017-01-01 RX ORDER — ATORVASTATIN CALCIUM 40 MG/1
40 TABLET, FILM COATED ORAL DAILY
Status: ON HOLD
Start: 2017-01-01 | End: 2017-01-01 | Stop reason: HOSPADM

## 2017-01-01 RX ORDER — CEPHALEXIN 500 MG/1
500 CAPSULE ORAL EVERY 12 HOURS
Start: 2017-01-01 | End: 2017-01-01 | Stop reason: HOSPADM

## 2017-01-01 RX ORDER — SCOLOPAMINE TRANSDERMAL SYSTEM 1 MG/1
1 PATCH, EXTENDED RELEASE TRANSDERMAL
Status: DISCONTINUED | OUTPATIENT
Start: 2017-01-01 | End: 2017-01-01

## 2017-01-01 RX ORDER — LEVOTHYROXINE SODIUM 150 UG/1
150 TABLET ORAL
Status: ON HOLD
Start: 2017-01-01 | End: 2017-01-01 | Stop reason: HOSPADM

## 2017-01-01 RX ORDER — BISACODYL 10 MG
10 SUPPOSITORY, RECTAL RECTAL DAILY
Status: DISCONTINUED | OUTPATIENT
Start: 2017-01-01 | End: 2017-01-01 | Stop reason: HOSPADM

## 2017-01-01 RX ORDER — SODIUM CHLORIDE 9 MG/ML
INJECTION, SOLUTION INTRAVENOUS CONTINUOUS
Status: DISCONTINUED | OUTPATIENT
Start: 2017-01-01 | End: 2017-01-01

## 2017-01-01 RX ORDER — POTASSIUM CHLORIDE 20 MEQ/1
40 TABLET, EXTENDED RELEASE ORAL ONCE
Status: COMPLETED | OUTPATIENT
Start: 2017-01-01 | End: 2017-01-01

## 2017-01-01 RX ORDER — AMOXICILLIN 250 MG
1 CAPSULE ORAL 2 TIMES DAILY
Status: DISCONTINUED | OUTPATIENT
Start: 2017-01-01 | End: 2017-01-01

## 2017-01-01 RX ORDER — ONDANSETRON 2 MG/ML
4 INJECTION INTRAMUSCULAR; INTRAVENOUS EVERY 6 HOURS PRN
Status: DISCONTINUED | OUTPATIENT
Start: 2017-01-01 | End: 2017-01-01 | Stop reason: HOSPADM

## 2017-01-01 RX ORDER — LEVETIRACETAM 750 MG/1
750 TABLET ORAL 2 TIMES DAILY
Qty: 180 TABLET | Refills: 1 | Status: ON HOLD | OUTPATIENT
Start: 2017-01-01 | End: 2017-01-01 | Stop reason: HOSPADM

## 2017-01-01 RX ORDER — MEMANTINE HYDROCHLORIDE 10 MG/1
10 TABLET ORAL DAILY
Status: DISCONTINUED | OUTPATIENT
Start: 2017-01-01 | End: 2017-01-01

## 2017-01-01 RX ORDER — CEPHALEXIN 250 MG/1
500 CAPSULE ORAL EVERY 12 HOURS
Status: DISCONTINUED | OUTPATIENT
Start: 2017-01-01 | End: 2017-01-01

## 2017-01-01 RX ORDER — LANOLIN ALCOHOL/MO/W.PET/CERES
800 CREAM (GRAM) TOPICAL
Status: DISCONTINUED | OUTPATIENT
Start: 2017-01-01 | End: 2017-01-01

## 2017-01-01 RX ORDER — CARVEDILOL 6.25 MG/1
6.25 TABLET ORAL 2 TIMES DAILY
Status: DISCONTINUED | OUTPATIENT
Start: 2017-01-01 | End: 2017-01-01

## 2017-01-01 RX ORDER — FINASTERIDE 5 MG/1
5 TABLET, FILM COATED ORAL DAILY
Status: DISCONTINUED | OUTPATIENT
Start: 2017-01-01 | End: 2017-01-01 | Stop reason: HOSPADM

## 2017-01-01 RX ORDER — CARVEDILOL 25 MG/1
25 TABLET ORAL 2 TIMES DAILY
Status: DISCONTINUED | OUTPATIENT
Start: 2017-01-01 | End: 2017-01-01 | Stop reason: HOSPADM

## 2017-01-01 RX ORDER — MULTIVITAMIN
1 TABLET ORAL DAILY
Status: ON HOLD | COMMUNITY
End: 2017-01-01 | Stop reason: HOSPADM

## 2017-01-01 RX ORDER — IBUPROFEN 200 MG
16 TABLET ORAL
Status: DISCONTINUED | OUTPATIENT
Start: 2017-01-01 | End: 2017-01-01 | Stop reason: HOSPADM

## 2017-01-01 RX ORDER — RAMELTEON 8 MG/1
8 TABLET ORAL NIGHTLY PRN
Status: DISCONTINUED | OUTPATIENT
Start: 2017-01-01 | End: 2017-01-01 | Stop reason: HOSPADM

## 2017-01-01 RX ORDER — AMOXICILLIN 250 MG
1 CAPSULE ORAL DAILY
Status: DISCONTINUED | OUTPATIENT
Start: 2017-01-01 | End: 2017-01-01 | Stop reason: HOSPADM

## 2017-01-01 RX ORDER — LABETALOL HYDROCHLORIDE 5 MG/ML
20 INJECTION, SOLUTION INTRAVENOUS ONCE AS NEEDED
Status: COMPLETED | OUTPATIENT
Start: 2017-01-01 | End: 2017-01-01

## 2017-01-01 RX ORDER — HYDRALAZINE HYDROCHLORIDE 25 MG/1
25 TABLET, FILM COATED ORAL EVERY 6 HOURS PRN
Status: DISCONTINUED | OUTPATIENT
Start: 2017-01-01 | End: 2017-01-01 | Stop reason: HOSPADM

## 2017-01-01 RX ORDER — GLUCAGON 1 MG
1 KIT INJECTION
Status: DISCONTINUED | OUTPATIENT
Start: 2017-01-01 | End: 2017-01-01 | Stop reason: HOSPADM

## 2017-01-01 RX ORDER — LEVOTHYROXINE SODIUM 150 UG/1
150 TABLET ORAL DAILY
Status: DISCONTINUED | OUTPATIENT
Start: 2017-01-01 | End: 2017-01-01 | Stop reason: HOSPADM

## 2017-01-01 RX ORDER — ONDANSETRON 2 MG/ML
4 INJECTION INTRAMUSCULAR; INTRAVENOUS EVERY 6 HOURS PRN
Status: DISCONTINUED | OUTPATIENT
Start: 2017-01-01 | End: 2017-01-01

## 2017-01-01 RX ORDER — PHENOBARBITAL 32.4 MG/1
32.4 TABLET ORAL 3 TIMES DAILY
Status: ON HOLD | COMMUNITY
End: 2017-01-01 | Stop reason: HOSPADM

## 2017-01-01 RX ORDER — CHOLECALCIFEROL (VITAMIN D3) 25 MCG
1000 TABLET ORAL DAILY
Status: DISCONTINUED | OUTPATIENT
Start: 2017-01-01 | End: 2017-01-01

## 2017-01-01 RX ORDER — ONDANSETRON 4 MG/1
4 TABLET, FILM COATED ORAL EVERY 6 HOURS PRN
Status: DISCONTINUED | OUTPATIENT
Start: 2017-01-01 | End: 2017-01-01 | Stop reason: HOSPADM

## 2017-01-01 RX ORDER — FERROUS SULFATE, DRIED 160(50) MG
1 TABLET, EXTENDED RELEASE ORAL 2 TIMES DAILY
Status: DISCONTINUED | OUTPATIENT
Start: 2017-01-01 | End: 2017-01-01

## 2017-01-01 RX ORDER — OXYCODONE AND ACETAMINOPHEN 5; 325 MG/1; MG/1
1 TABLET ORAL ONCE
Status: COMPLETED | OUTPATIENT
Start: 2017-01-01 | End: 2017-01-01

## 2017-01-01 RX ORDER — POTASSIUM CHLORIDE 20 MEQ/15ML
60 SOLUTION ORAL
Status: DISCONTINUED | OUTPATIENT
Start: 2017-01-01 | End: 2017-01-01

## 2017-01-01 RX ORDER — PHENYLEPHRINE HYDROCHLORIDE 10 MG/ML
INJECTION INTRAVENOUS
Status: DISCONTINUED | OUTPATIENT
Start: 2017-01-01 | End: 2017-01-01

## 2017-01-01 RX ORDER — LABETALOL HYDROCHLORIDE 5 MG/ML
20 INJECTION, SOLUTION INTRAVENOUS ONCE
Status: DISCONTINUED | OUTPATIENT
Start: 2017-01-01 | End: 2017-01-01

## 2017-01-01 RX ORDER — AMOXICILLIN 250 MG
1 CAPSULE ORAL 2 TIMES DAILY PRN
Status: ON HOLD
Start: 2017-01-01 | End: 2017-01-01 | Stop reason: HOSPADM

## 2017-01-01 RX ORDER — ACETAMINOPHEN 325 MG/1
650 TABLET ORAL EVERY 6 HOURS PRN
Refills: 0 | COMMUNITY
Start: 2017-01-01 | End: 2017-01-01

## 2017-01-01 RX ORDER — DEXTROMETHORPHAN HYDROBROMIDE, GUAIFENESIN 5; 100 MG/5ML; MG/5ML
650 LIQUID ORAL EVERY 6 HOURS PRN
Status: ON HOLD | COMMUNITY
End: 2017-01-01 | Stop reason: HOSPADM

## 2017-01-01 RX ORDER — OLANZAPINE 5 MG/1
5 TABLET ORAL EVERY 6 HOURS PRN
Status: DISCONTINUED | OUTPATIENT
Start: 2017-01-01 | End: 2017-01-01 | Stop reason: HOSPADM

## 2017-01-01 RX ORDER — PHENOBARBITAL 30 MG/1
32.4 TABLET ORAL 3 TIMES DAILY
Status: DISCONTINUED | OUTPATIENT
Start: 2017-01-01 | End: 2017-01-01 | Stop reason: ALTCHOICE

## 2017-01-01 RX ORDER — CARVEDILOL 12.5 MG/1
25 TABLET ORAL 2 TIMES DAILY
Status: DISCONTINUED | OUTPATIENT
Start: 2017-01-01 | End: 2017-01-01

## 2017-01-01 RX ORDER — HYDROCODONE BITARTRATE AND ACETAMINOPHEN 500; 5 MG/1; MG/1
TABLET ORAL
Status: DISCONTINUED | OUTPATIENT
Start: 2017-01-01 | End: 2017-01-01 | Stop reason: HOSPADM

## 2017-01-01 RX ORDER — POLYETHYLENE GLYCOL 3350 17 G/17G
17 POWDER, FOR SOLUTION ORAL DAILY
Status: DISCONTINUED | OUTPATIENT
Start: 2017-01-01 | End: 2017-01-01 | Stop reason: HOSPADM

## 2017-01-01 RX ORDER — FENTANYL CITRATE 50 UG/ML
INJECTION, SOLUTION INTRAMUSCULAR; INTRAVENOUS
Status: COMPLETED
Start: 2017-01-01 | End: 2017-01-01

## 2017-01-01 RX ORDER — CARVEDILOL 12.5 MG/1
12.5 TABLET ORAL 2 TIMES DAILY
Status: DISCONTINUED | OUTPATIENT
Start: 2017-01-01 | End: 2017-01-01

## 2017-01-01 RX ORDER — QUETIAPINE FUMARATE 50 MG/1
50 TABLET, EXTENDED RELEASE ORAL ONCE AS NEEDED
Status: ON HOLD
Start: 2017-01-01 | End: 2017-01-01 | Stop reason: HOSPADM

## 2017-01-01 RX ORDER — CARVEDILOL 6.25 MG/1
6.25 TABLET ORAL ONCE
Status: COMPLETED | OUTPATIENT
Start: 2017-01-01 | End: 2017-01-01

## 2017-01-01 RX ORDER — CARVEDILOL 25 MG/1
25 TABLET ORAL 2 TIMES DAILY
Status: ON HOLD
Start: 2017-01-01 | End: 2017-01-01 | Stop reason: HOSPADM

## 2017-01-01 RX ORDER — LORAZEPAM 2 MG/ML
0.5 INJECTION INTRAMUSCULAR EVERY 30 MIN PRN
Status: DISCONTINUED | OUTPATIENT
Start: 2017-01-01 | End: 2017-01-01 | Stop reason: HOSPADM

## 2017-01-01 RX ORDER — PHENOBARBITAL 30 MG/1
30 TABLET ORAL 3 TIMES DAILY
Status: DISCONTINUED | OUTPATIENT
Start: 2017-01-01 | End: 2017-01-01 | Stop reason: HOSPADM

## 2017-01-01 RX ORDER — ATORVASTATIN CALCIUM 10 MG/1
40 TABLET, FILM COATED ORAL DAILY
Status: DISCONTINUED | OUTPATIENT
Start: 2017-01-01 | End: 2017-01-01 | Stop reason: HOSPADM

## 2017-01-01 RX ORDER — MEMANTINE HYDROCHLORIDE 10 MG/1
TABLET ORAL
Qty: 180 TABLET | Refills: 1 | Status: ON HOLD | OUTPATIENT
Start: 2017-01-01 | End: 2017-01-01 | Stop reason: HOSPADM

## 2017-01-01 RX ORDER — LEVOTHYROXINE SODIUM 150 UG/1
150 TABLET ORAL DAILY
Status: ON HOLD | COMMUNITY
End: 2017-01-01 | Stop reason: HOSPADM

## 2017-01-01 RX ORDER — LACTULOSE 10 G/15ML
20 SOLUTION ORAL DAILY
Status: ON HOLD | COMMUNITY
End: 2017-01-01 | Stop reason: HOSPADM

## 2017-01-01 RX ORDER — HYDROCODONE BITARTRATE AND ACETAMINOPHEN 5; 325 MG/1; MG/1
1 TABLET ORAL EVERY 4 HOURS PRN
Qty: 30 TABLET | Refills: 0 | Status: ON HOLD | OUTPATIENT
Start: 2017-01-01 | End: 2017-01-01 | Stop reason: HOSPADM

## 2017-01-01 RX ORDER — CLINDAMYCIN PHOSPHATE 900 MG/50ML
INJECTION, SOLUTION INTRAVENOUS
Status: DISCONTINUED | OUTPATIENT
Start: 2017-01-01 | End: 2017-01-01

## 2017-01-01 RX ORDER — CALCIUM CARBONATE 500(1250)
1000 TABLET ORAL DAILY
Refills: 0 | Status: ON HOLD | COMMUNITY
Start: 2017-01-01 | End: 2017-01-01 | Stop reason: HOSPADM

## 2017-01-01 RX ORDER — HYDRALAZINE HYDROCHLORIDE 20 MG/ML
10 INJECTION INTRAMUSCULAR; INTRAVENOUS EVERY 6 HOURS PRN
Status: DISCONTINUED | OUTPATIENT
Start: 2017-01-01 | End: 2017-01-01

## 2017-01-01 RX ORDER — FENTANYL CITRATE 50 UG/ML
INJECTION, SOLUTION INTRAMUSCULAR; INTRAVENOUS
Status: DISCONTINUED | OUTPATIENT
Start: 2017-01-01 | End: 2017-01-01

## 2017-01-01 RX ADMIN — PHENOBARBITAL 30 MG: 30 TABLET ORAL at 09:07

## 2017-01-01 RX ADMIN — CARVEDILOL 12.5 MG: 12.5 TABLET, FILM COATED ORAL at 09:08

## 2017-01-01 RX ADMIN — GABAPENTIN 600 MG: 300 CAPSULE ORAL at 04:07

## 2017-01-01 RX ADMIN — LEVETIRACETAM 750 MG: 750 TABLET, FILM COATED ORAL at 10:07

## 2017-01-01 RX ADMIN — MEMANTINE HYDROCHLORIDE 10 MG: 10 TABLET ORAL at 09:07

## 2017-01-01 RX ADMIN — ATORVASTATIN CALCIUM 40 MG: 20 TABLET, FILM COATED ORAL at 09:08

## 2017-01-01 RX ADMIN — GABAPENTIN 800 MG: 400 CAPSULE ORAL at 02:08

## 2017-01-01 RX ADMIN — PHENOBARBITAL 30 MG: 30 TABLET ORAL at 02:07

## 2017-01-01 RX ADMIN — CARVEDILOL 25 MG: 25 TABLET, FILM COATED ORAL at 09:08

## 2017-01-01 RX ADMIN — TAMSULOSIN HYDROCHLORIDE 0.4 MG: 0.4 CAPSULE ORAL at 02:08

## 2017-01-01 RX ADMIN — LEVOTHYROXINE SODIUM 137 MCG: 25 TABLET ORAL at 09:08

## 2017-01-01 RX ADMIN — ATORVASTATIN CALCIUM 40 MG: 20 TABLET, FILM COATED ORAL at 08:08

## 2017-01-01 RX ADMIN — APIXABAN 5 MG: 2.5 TABLET, FILM COATED ORAL at 09:08

## 2017-01-01 RX ADMIN — HYDRALAZINE HYDROCHLORIDE 25 MG: 25 TABLET, FILM COATED ORAL at 06:07

## 2017-01-01 RX ADMIN — SODIUM CHLORIDE: 0.9 INJECTION, SOLUTION INTRAVENOUS at 06:08

## 2017-01-01 RX ADMIN — PHENOBARBITAL 30 MG: 30 TABLET ORAL at 06:08

## 2017-01-01 RX ADMIN — PHENOBARBITAL 30 MG: 30 TABLET ORAL at 02:08

## 2017-01-01 RX ADMIN — GABAPENTIN 800 MG: 400 CAPSULE ORAL at 05:08

## 2017-01-01 RX ADMIN — Medication 1250 MG: at 11:09

## 2017-01-01 RX ADMIN — OYSTER SHELL CALCIUM WITH VITAMIN D 1 TABLET: 500; 200 TABLET, FILM COATED ORAL at 08:07

## 2017-01-01 RX ADMIN — LEVOTHYROXINE SODIUM 150 MCG: 150 TABLET ORAL at 08:08

## 2017-01-01 RX ADMIN — STANDARDIZED SENNA CONCENTRATE AND DOCUSATE SODIUM 1 TABLET: 8.6; 5 TABLET, FILM COATED ORAL at 08:08

## 2017-01-01 RX ADMIN — MEMANTINE HYDROCHLORIDE 10 MG: 10 TABLET ORAL at 09:08

## 2017-01-01 RX ADMIN — IOHEXOL 100 ML: 350 INJECTION, SOLUTION INTRAVENOUS at 01:07

## 2017-01-01 RX ADMIN — SODIUM CHLORIDE 500 ML: 900 INJECTION, SOLUTION INTRAVENOUS at 10:09

## 2017-01-01 RX ADMIN — LEVETIRACETAM 750 MG: 750 TABLET ORAL at 09:08

## 2017-01-01 RX ADMIN — CEFEPIME HYDROCHLORIDE 2 G: 2 INJECTION, SOLUTION INTRAVENOUS at 10:09

## 2017-01-01 RX ADMIN — BISACODYL 10 MG: 10 SUPPOSITORY RECTAL at 08:08

## 2017-01-01 RX ADMIN — GABAPENTIN 600 MG: 300 CAPSULE ORAL at 11:07

## 2017-01-01 RX ADMIN — LEVETIRACETAM 750 MG: 750 TABLET, FILM COATED ORAL at 08:07

## 2017-01-01 RX ADMIN — LEVOTHYROXINE SODIUM 137 MCG: 25 TABLET ORAL at 06:07

## 2017-01-01 RX ADMIN — APIXABAN 10 MG: 5 TABLET, FILM COATED ORAL at 08:07

## 2017-01-01 RX ADMIN — GABAPENTIN 800 MG: 400 CAPSULE ORAL at 09:08

## 2017-01-01 RX ADMIN — GABAPENTIN 600 MG: 300 CAPSULE ORAL at 09:07

## 2017-01-01 RX ADMIN — MEMANTINE HYDROCHLORIDE 10 MG: 10 TABLET ORAL at 08:08

## 2017-01-01 RX ADMIN — THERA TABS 1 TABLET: TAB at 09:08

## 2017-01-01 RX ADMIN — APIXABAN 5 MG: 2.5 TABLET, FILM COATED ORAL at 08:08

## 2017-01-01 RX ADMIN — POTASSIUM CHLORIDE 40 MEQ: 20 SOLUTION ORAL at 05:07

## 2017-01-01 RX ADMIN — CLINDAMYCIN PHOSPHATE 900 MG: 18 INJECTION, SOLUTION INTRAVENOUS at 02:07

## 2017-01-01 RX ADMIN — GABAPENTIN 800 MG: 400 CAPSULE ORAL at 06:08

## 2017-01-01 RX ADMIN — CARVEDILOL 6.25 MG: 6.25 TABLET, FILM COATED ORAL at 08:07

## 2017-01-01 RX ADMIN — CARVEDILOL 25 MG: 25 TABLET, FILM COATED ORAL at 08:08

## 2017-01-01 RX ADMIN — METOPROLOL TARTRATE 25 MG: 25 TABLET ORAL at 10:07

## 2017-01-01 RX ADMIN — CLINDAMYCIN PHOSPHATE 900 MG: 18 INJECTION, SOLUTION INTRAVENOUS at 10:07

## 2017-01-01 RX ADMIN — METOPROLOL TARTRATE 5 MG: 5 INJECTION INTRAVENOUS at 11:07

## 2017-01-01 RX ADMIN — SODIUM CHLORIDE 500 ML: 0.9 INJECTION, SOLUTION INTRAVENOUS at 07:07

## 2017-01-01 RX ADMIN — STANDARDIZED SENNA CONCENTRATE AND DOCUSATE SODIUM 1 TABLET: 8.6; 5 TABLET, FILM COATED ORAL at 09:08

## 2017-01-01 RX ADMIN — SODIUM CHLORIDE 500 ML: 0.9 INJECTION, SOLUTION INTRAVENOUS at 03:07

## 2017-01-01 RX ADMIN — MORPHINE SULFATE 4 MG: 2 INJECTION, SOLUTION INTRAMUSCULAR; INTRAVENOUS at 09:07

## 2017-01-01 RX ADMIN — APIXABAN 5 MG: 2.5 TABLET, FILM COATED ORAL at 12:08

## 2017-01-01 RX ADMIN — GABAPENTIN 800 MG: 400 CAPSULE ORAL at 10:08

## 2017-01-01 RX ADMIN — LEVETIRACETAM 500 MG: 750 TABLET, FILM COATED ORAL at 10:07

## 2017-01-01 RX ADMIN — POLYETHYLENE GLYCOL 3350 17 G: 17 POWDER, FOR SOLUTION ORAL at 09:08

## 2017-01-01 RX ADMIN — PHENOBARBITAL 30 MG: 30 TABLET ORAL at 07:07

## 2017-01-01 RX ADMIN — TAMSULOSIN HYDROCHLORIDE 0.4 MG: 0.4 CAPSULE ORAL at 09:08

## 2017-01-01 RX ADMIN — CEPHALEXIN 500 MG: 250 CAPSULE ORAL at 09:08

## 2017-01-01 RX ADMIN — VANCOMYCIN HYDROCHLORIDE 1250 MG: 100 INJECTION, POWDER, LYOPHILIZED, FOR SOLUTION INTRAVENOUS at 11:08

## 2017-01-01 RX ADMIN — CLINDAMYCIN IN 5 PERCENT DEXTROSE 900 MG: 18 INJECTION, SOLUTION INTRAVENOUS at 12:07

## 2017-01-01 RX ADMIN — APIXABAN 5 MG: 5 TABLET, FILM COATED ORAL at 09:08

## 2017-01-01 RX ADMIN — GABAPENTIN 600 MG: 300 CAPSULE ORAL at 10:07

## 2017-01-01 RX ADMIN — APIXABAN 10 MG: 5 TABLET, FILM COATED ORAL at 04:07

## 2017-01-01 RX ADMIN — ATORVASTATIN CALCIUM 40 MG: 10 TABLET, FILM COATED ORAL at 10:07

## 2017-01-01 RX ADMIN — LEVETIRACETAM 750 MG: 750 TABLET ORAL at 08:08

## 2017-01-01 RX ADMIN — FINASTERIDE 5 MG: 5 TABLET, FILM COATED ORAL at 09:08

## 2017-01-01 RX ADMIN — CEPHALEXIN 500 MG: 250 CAPSULE ORAL at 11:08

## 2017-01-01 RX ADMIN — POLYETHYLENE GLYCOL 3350 17 G: 17 POWDER, FOR SOLUTION ORAL at 08:08

## 2017-01-01 RX ADMIN — PHENYLEPHRINE HYDROCHLORIDE 100 MCG: 10 INJECTION INTRAVENOUS at 02:07

## 2017-01-01 RX ADMIN — GABAPENTIN 800 MG: 400 CAPSULE ORAL at 01:08

## 2017-01-01 RX ADMIN — OYSTER SHELL CALCIUM WITH VITAMIN D 1 TABLET: 500; 200 TABLET, FILM COATED ORAL at 09:07

## 2017-01-01 RX ADMIN — PHENOBARBITAL 30 MG: 30 TABLET ORAL at 06:07

## 2017-01-01 RX ADMIN — THERA TABS 1 TABLET: TAB at 08:08

## 2017-01-01 RX ADMIN — VITAMIN D, TAB 1000IU (100/BT) 1000 UNITS: 25 TAB at 10:07

## 2017-01-01 RX ADMIN — PHENOBARBITAL 30 MG: 30 TABLET ORAL at 07:08

## 2017-01-01 RX ADMIN — LEVOTHYROXINE SODIUM 150 MCG: 75 TABLET ORAL at 06:08

## 2017-01-01 RX ADMIN — MEMANTINE HYDROCHLORIDE 10 MG: 10 TABLET ORAL at 08:07

## 2017-01-01 RX ADMIN — REGULAR STRENGTH 325 MG: 325 TABLET ORAL at 11:08

## 2017-01-01 RX ADMIN — ROPIVACAINE HYDROCHLORIDE 8 ML/HR: 2 INJECTION, SOLUTION EPIDURAL; INFILTRATION at 07:07

## 2017-01-01 RX ADMIN — BISACODYL 10 MG: 10 SUPPOSITORY RECTAL at 05:08

## 2017-01-01 RX ADMIN — Medication 5 UNITS: at 11:07

## 2017-01-01 RX ADMIN — CARVEDILOL 12.5 MG: 12.5 TABLET, FILM COATED ORAL at 08:08

## 2017-01-01 RX ADMIN — PHENOBARBITAL 30 MG: 30 TABLET ORAL at 05:08

## 2017-01-01 RX ADMIN — PHENOBARBITAL 30 MG: 30 TABLET ORAL at 05:07

## 2017-01-01 RX ADMIN — FINASTERIDE 5 MG: 5 TABLET, FILM COATED ORAL at 08:08

## 2017-01-01 RX ADMIN — LEVETIRACETAM 750 MG: 750 TABLET, FILM COATED ORAL at 09:07

## 2017-01-01 RX ADMIN — HEPARIN SODIUM AND DEXTROSE 17 UNITS/KG/HR: 10000; 5 INJECTION INTRAVENOUS at 04:07

## 2017-01-01 RX ADMIN — CEFTRIAXONE 1 G: 1 INJECTION, SOLUTION INTRAVENOUS at 05:08

## 2017-01-01 RX ADMIN — HYDRALAZINE HYDROCHLORIDE 25 MG: 25 TABLET, FILM COATED ORAL at 01:07

## 2017-01-01 RX ADMIN — LEVOTHYROXINE SODIUM 137 MCG: 25 TABLET ORAL at 12:07

## 2017-01-01 RX ADMIN — PHENOBARBITAL 30 MG: 30 TABLET ORAL at 04:07

## 2017-01-01 RX ADMIN — LABETALOL HYDROCHLORIDE 20 MG: 5 INJECTION, SOLUTION INTRAVENOUS at 05:07

## 2017-01-01 RX ADMIN — CARVEDILOL 6.25 MG: 6.25 TABLET, FILM COATED ORAL at 12:07

## 2017-01-01 RX ADMIN — SODIUM CHLORIDE 1000 ML: 0.9 INJECTION, SOLUTION INTRAVENOUS at 11:08

## 2017-01-01 RX ADMIN — PHENOBARBITAL 30 MG: 30 TABLET ORAL at 09:08

## 2017-01-01 RX ADMIN — FENTANYL CITRATE 50 MCG: 50 INJECTION, SOLUTION INTRAMUSCULAR; INTRAVENOUS at 04:07

## 2017-01-01 RX ADMIN — PHENOBARBITAL 30 MG: 30 TABLET ORAL at 03:08

## 2017-01-01 RX ADMIN — Medication 3 ML: at 06:07

## 2017-01-01 RX ADMIN — PROPOFOL 10 MCG/KG/MIN: 10 INJECTION, EMULSION INTRAVENOUS at 10:07

## 2017-01-01 RX ADMIN — ATORVASTATIN CALCIUM 40 MG: 20 TABLET, FILM COATED ORAL at 11:08

## 2017-01-01 RX ADMIN — Medication 5 UNITS: at 01:08

## 2017-01-01 RX ADMIN — STANDARDIZED SENNA CONCENTRATE AND DOCUSATE SODIUM 1 TABLET: 8.6; 5 TABLET, FILM COATED ORAL at 10:07

## 2017-01-01 RX ADMIN — CALCIUM 1000 MG: 500 TABLET ORAL at 08:07

## 2017-01-01 RX ADMIN — GABAPENTIN 600 MG: 300 CAPSULE ORAL at 06:07

## 2017-01-01 RX ADMIN — GABAPENTIN 600 MG: 300 CAPSULE ORAL at 05:07

## 2017-01-01 RX ADMIN — FENTANYL CITRATE 50 MCG: 50 INJECTION INTRAMUSCULAR; INTRAVENOUS at 04:07

## 2017-01-01 RX ADMIN — VANCOMYCIN HYDROCHLORIDE 2000 MG: 100 INJECTION, POWDER, LYOPHILIZED, FOR SOLUTION INTRAVENOUS at 11:08

## 2017-01-01 RX ADMIN — PHENOBARBITAL 30 MG: 30 TABLET ORAL at 10:07

## 2017-01-01 RX ADMIN — BISACODYL 10 MG: 10 SUPPOSITORY RECTAL at 11:08

## 2017-01-01 RX ADMIN — ROPIVACAINE HYDROCHLORIDE 8 ML/HR: 2 INJECTION, SOLUTION EPIDURAL; INFILTRATION at 11:07

## 2017-01-01 RX ADMIN — CARVEDILOL 12.5 MG: 12.5 TABLET, FILM COATED ORAL at 09:07

## 2017-01-01 RX ADMIN — SODIUM CHLORIDE, SODIUM GLUCONATE, SODIUM ACETATE, POTASSIUM CHLORIDE, MAGNESIUM CHLORIDE, SODIUM PHOSPHATE, DIBASIC, AND POTASSIUM PHOSPHATE: .53; .5; .37; .037; .03; .012; .00082 INJECTION, SOLUTION INTRAVENOUS at 10:07

## 2017-01-01 RX ADMIN — GABAPENTIN 600 MG: 300 CAPSULE ORAL at 03:07

## 2017-01-01 RX ADMIN — Medication 3 ML: at 10:07

## 2017-01-01 RX ADMIN — CARVEDILOL 25 MG: 25 TABLET, FILM COATED ORAL at 11:08

## 2017-01-01 RX ADMIN — TAMSULOSIN HYDROCHLORIDE 0.4 MG: 0.4 CAPSULE ORAL at 08:08

## 2017-01-01 RX ADMIN — STANDARDIZED SENNA CONCENTRATE AND DOCUSATE SODIUM 1 TABLET: 8.6; 5 TABLET, FILM COATED ORAL at 09:07

## 2017-01-01 RX ADMIN — CEPHALEXIN 500 MG: 250 CAPSULE ORAL at 08:08

## 2017-01-01 RX ADMIN — FENTANYL CITRATE 50 MCG: 50 INJECTION, SOLUTION INTRAMUSCULAR; INTRAVENOUS at 03:07

## 2017-01-01 RX ADMIN — SODIUM CHLORIDE 1000 ML: 0.9 INJECTION, SOLUTION INTRAVENOUS at 10:09

## 2017-01-01 RX ADMIN — STANDARDIZED SENNA CONCENTRATE AND DOCUSATE SODIUM 1 TABLET: 8.6; 5 TABLET, FILM COATED ORAL at 08:07

## 2017-01-01 RX ADMIN — OXYCODONE HYDROCHLORIDE AND ACETAMINOPHEN 1 TABLET: 5; 325 TABLET ORAL at 04:08

## 2017-01-01 RX ADMIN — MORPHINE SULFATE 0.5 MG/HR: 10 INJECTION INTRAMUSCULAR; INTRAVENOUS; SUBCUTANEOUS at 03:09

## 2017-01-01 RX ADMIN — SODIUM CHLORIDE: 0.9 INJECTION, SOLUTION INTRAVENOUS at 02:08

## 2017-01-01 RX ADMIN — APIXABAN 10 MG: 5 TABLET, FILM COATED ORAL at 10:07

## 2017-01-01 RX ADMIN — PHENOBARBITAL 30 MG: 30 TABLET ORAL at 10:08

## 2017-01-01 RX ADMIN — GABAPENTIN 800 MG: 400 CAPSULE ORAL at 03:08

## 2017-01-01 RX ADMIN — HEPARIN SODIUM AND DEXTROSE 21 UNITS/KG/HR: 10000; 5 INJECTION INTRAVENOUS at 01:07

## 2017-01-01 RX ADMIN — CARVEDILOL 25 MG: 25 TABLET, FILM COATED ORAL at 08:07

## 2017-01-01 RX ADMIN — ATORVASTATIN CALCIUM 40 MG: 10 TABLET, FILM COATED ORAL at 08:07

## 2017-01-01 RX ADMIN — LEVETIRACETAM 250 MG: 750 TABLET, FILM COATED ORAL at 08:07

## 2017-01-01 RX ADMIN — PHENOBARBITAL 30 MG: 30 TABLET ORAL at 11:07

## 2017-01-01 RX ADMIN — OLANZAPINE 5 MG: 5 TABLET, FILM COATED ORAL at 09:08

## 2017-01-01 RX ADMIN — HEPARIN SODIUM AND DEXTROSE 17 UNITS/KG/HR: 10000; 5 INJECTION INTRAVENOUS at 07:07

## 2017-01-01 RX ADMIN — OYSTER SHELL CALCIUM WITH VITAMIN D 1 TABLET: 500; 200 TABLET, FILM COATED ORAL at 10:07

## 2017-01-01 RX ADMIN — PHENOBARBITAL 30 MG: 30 TABLET ORAL at 03:07

## 2017-01-01 RX ADMIN — PHENOBARBITAL 30 MG: 30 TABLET ORAL at 01:08

## 2017-01-01 RX ADMIN — OXYCODONE HYDROCHLORIDE 5 MG: 5 TABLET ORAL at 07:07

## 2017-01-01 RX ADMIN — OLANZAPINE 5 MG: 5 TABLET, FILM COATED ORAL at 05:08

## 2017-01-01 RX ADMIN — FENTANYL CITRATE 200 MCG: 50 INJECTION, SOLUTION INTRAMUSCULAR; INTRAVENOUS at 02:07

## 2017-01-01 RX ADMIN — LIDOCAINE HYDROCHLORIDE 50 MG: 20 INJECTION, SOLUTION INTRAVENOUS at 10:07

## 2017-01-01 RX ADMIN — CEFEPIME HYDROCHLORIDE 2 G: 2 INJECTION, SOLUTION INTRAVENOUS at 11:08

## 2017-01-01 RX ADMIN — VANCOMYCIN HYDROCHLORIDE 1250 MG: 100 INJECTION, POWDER, LYOPHILIZED, FOR SOLUTION INTRAVENOUS at 10:08

## 2017-01-01 RX ADMIN — POTASSIUM CHLORIDE 40 MEQ: 1500 TABLET, EXTENDED RELEASE ORAL at 11:08

## 2017-01-01 RX ADMIN — ETOMIDATE 20 MG: 2 INJECTION, SOLUTION INTRAVENOUS at 02:07

## 2017-01-01 RX ADMIN — LEVOTHYROXINE SODIUM 137 MCG: 25 TABLET ORAL at 11:08

## 2017-01-01 RX ADMIN — CARVEDILOL 12.5 MG: 12.5 TABLET, FILM COATED ORAL at 08:07

## 2017-01-01 RX ADMIN — LEVOTHYROXINE SODIUM 150 MCG: 75 TABLET ORAL at 05:08

## 2017-01-01 RX ADMIN — OLANZAPINE 5 MG: 5 TABLET, FILM COATED ORAL at 02:08

## 2017-01-01 RX ADMIN — APIXABAN 5 MG: 5 TABLET, FILM COATED ORAL at 08:08

## 2017-01-01 RX ADMIN — ATORVASTATIN CALCIUM 40 MG: 10 TABLET, FILM COATED ORAL at 09:07

## 2017-01-01 RX ADMIN — SODIUM CHLORIDE 1000 ML: 0.9 INJECTION, SOLUTION INTRAVENOUS at 08:07

## 2017-01-01 RX ADMIN — NICARDIPINE HYDROCHLORIDE 5 MG/HR: 0.2 INJECTION, SOLUTION INTRAVENOUS at 09:07

## 2017-01-01 RX ADMIN — POTASSIUM CHLORIDE 40 MEQ: 20 SOLUTION ORAL at 11:07

## 2017-01-01 RX ADMIN — GABAPENTIN 600 MG: 300 CAPSULE ORAL at 02:07

## 2017-01-01 RX ADMIN — PHENYLEPHRINE HYDROCHLORIDE 100 MCG: 10 INJECTION INTRAVENOUS at 03:07

## 2017-01-01 RX ADMIN — ROPIVACAINE HYDROCHLORIDE 8 ML/HR: 2 INJECTION, SOLUTION EPIDURAL; INFILTRATION at 12:07

## 2017-01-01 RX ADMIN — MEMANTINE HYDROCHLORIDE 10 MG: 10 TABLET ORAL at 11:08

## 2017-01-01 RX ADMIN — ACETAMINOPHEN 650 MG: 325 TABLET ORAL at 01:08

## 2017-01-01 RX ADMIN — LEVOTHYROXINE SODIUM 150 MCG: 150 TABLET ORAL at 09:08

## 2017-01-01 RX ADMIN — ERGOCALCIFEROL 50000 UNITS: 1.25 CAPSULE ORAL at 12:07

## 2017-01-01 RX ADMIN — SODIUM CHLORIDE 1000 ML: 0.9 INJECTION, SOLUTION INTRAVENOUS at 03:08

## 2017-01-01 RX ADMIN — MEMANTINE HYDROCHLORIDE 10 MG: 10 TABLET ORAL at 10:07

## 2017-01-01 RX ADMIN — ROPIVACAINE HYDROCHLORIDE 8 ML/HR: 2 INJECTION, SOLUTION EPIDURAL; INFILTRATION at 09:07

## 2017-01-01 RX ADMIN — CARVEDILOL 12.5 MG: 12.5 TABLET, FILM COATED ORAL at 10:07

## 2017-01-01 RX ADMIN — RAMELTEON 8 MG: 8 TABLET, FILM COATED ORAL at 08:08

## 2017-01-01 RX ADMIN — SODIUM CHLORIDE 250 ML: 0.9 INJECTION, SOLUTION INTRAVENOUS at 06:07

## 2017-01-01 RX ADMIN — APIXABAN 5 MG: 5 TABLET, FILM COATED ORAL at 10:08

## 2017-01-30 PROBLEM — R41.3 MEMORY LOSS: Status: ACTIVE | Noted: 2017-01-01

## 2017-01-30 NOTE — PROGRESS NOTES
This office note has been dictated.  HISTORY OF PRESENT ILLNESS:  Virgil Melo returns to [facility] for   medical management of his seizure disorder and memory loss.  He is once again   accompanied by his daughter who helps provide the history.  There have been no   interval seizures.  He is currently receiving gabapentin 800 mg 3 times a day,   Keppra 750 mg twice a day and phenobarbital 32.4 mg one tablet 3 times a day.    His memory is described as so-so.  He and his daughter agree there really has   not been a great deal of change of late.  Although he has pretty good remote   memory, he does perseverate quite frequently.  Apparently, the staff at the   [facility] has also mentioned that he has also been having some   behavioral symptoms.  He is accusing others of taking his medications for   instance.  Fortunately, there have not been any serious issues up to this point.    NEUROLOGICAL REVIEW OF SYSTEMS:  He has decreased hearing and did not bring his   hearing aids to the clinic today.  There are only occasional headaches and these   are mild and do not require a great deal of intervention.  He denies fever,   chills or sweats, dizziness, visual symptoms, speech or swallowing difficulty,   chest pain, cough, shortness of breath, nausea, vomiting, diarrhea and   incontinence.  He has a walker at the nursing home, but will occasionally get by   using a cane as he did today in his clinic visit.    SOCIAL HISTORY:  He had a good holiday with his family.    MEDICATIONS:  I reviewed the list of medications and edited the electronic   record.    PHYSICAL EXAMINATION:  GENERAL:  He is alert and oriented.  His speech is parsimonious, appropriate and   adequately articulated.  VITAL SIGNS:  Reviewed and the blood pressure is noted to be elevated today.  I   am recommending he follow this up with his primary care.  He is neatly dressed   and in no acute distress.  NEUROLOGIC:  Cranial nerve  examination reveals postoperative pupils and mild   facial asymmetry without weakness or sensory deficit.  Hearing is again   decreased at voice frequencies, though he does not clearly lateralize to tuning   fork.  Nerves II through XII otherwise serially examined and intact.  His neck   is supple, the pulses equal and there were loud cardiac sounds from his valve   replacement heard in both carotid arteries.  There is no drift to the upper   extremities.  He has good  strength.  Fine motor movements   are adequately performed.  There is no tremor and he has normal muscle tone.    There are no gross sensory deficits.  He is able to rise from the chair and   walks with a shortened stride and a slightly wide base.  He benefits from the   cane to assist his balance.  Because of his rather prominent kyphoscoliosis, I   think he would do better with the walker.  Romberg sign is not present.  The   deep tendon reflexes are symmetrical with the exception of the ankle jerks,   which cannot be elicited.    I reviewed his clinic record and he has not had any recent laboratory performed.    I discussed the situation with him and his daughter in detail.    IMPRESSION AND PLAN:  In conclusion, this man is doing well from the standpoint   of his seizure disorder.  His memory problem seems to be fairly stable at this   time, although there is some concern about the behavioral symptomatology seeping   in.  I am going to continue him on his anticonvulsant regimen of gabapentin,   Keppra and phenobarbital.  I explained to the patient and his daughter that it   would be best to address his behavioral symptomatology with reassurance and   redirection as opposed to adding more psychotropic medication.  Again, I   advocated he use the walker in lieu of the cane.  He should also follow up with   his primary care for a blood pressure check as well as to obtain his annual   blood work.  His prescriptions are written as the phenobarbital  is a controlled   substance and have been provided to the patient to bring to the pharmacy.  His   next scheduled appointment with me will be in six months.  The family may call   if there are any questions in the interim.      ROSA MARIA/MOMO  dd: 01/30/2017 11:57:07 (CST)  td: 01/31/2017 01:47:38 (CST)  Doc ID   #[id number]  Job ID #[id number]    CC:

## 2017-01-30 NOTE — MR AVS SNAPSHOT
[facility]rology  [address] [facility] [address]  Phone: [phone]  Fax: [phone]                  Virgil Melo Jr.   2017 11:20 AM   Office Visit    Description:  Male : 1929   Provider:  Jay Mcnamara III, MD   Department:  [facility] - Neurology           Reason for Visit     Follow-up           Diagnoses this Visit        Comments    Seizure disorder    -  Primary     Memory loss         Essential hypertension         Acquired hypothyroidism         Scoliosis (and kyphoscoliosis), idiopathic                To Do List           Goals (5 Years of Data)     None      Follow-Up and Disposition     Return in about 6 months (around 2017).       These Medications        Disp Refills Start End    memantine (NAMENDA) 10 MG Tab 180 tablet 1 2017     1 BID    Pharmacy: [place] [id number] AT [facility] [address] Ph #: [phone]       phenobarbital (LUMINAL) 32.4 MG tablet 270 tablet 1 2017     Take 1 tablet (32.4 mg total) by mouth 3 (three) times daily. - Oral    Pharmacy: [place] [address] [facility] AT [facility] [address] Ph #: [phone]       levetiracetam (KEPPRA) 750 MG Tab 180 tablet 1 2017     Take 1 tablet (750 mg total) by mouth 2 (two) times daily. - Oral    Pharmacy: [place] [address] [facility] AT [facility] [address] Ph #: [phone]       gabapentin (NEURONTIN) 800 MG tablet 270 tablet 1 2017     Take 1 tablet (800 mg total) by mouth 3 (three) times daily. - Oral    Pharmacy: [place] [address] AT [facility] [address] Ph #: [phone]         [facility] On Call     [facility] On Call Nurse Care Line -  Assistance  Registered nurses in the [facility] On Call Center provide clinical advisement, health education,  "appointment booking, and other advisory services.  Call for this free service at [phone].             Medications           Message regarding Medications     Verify the changes and/or additions to your medication regime listed below are the same as discussed with your clinician today.  If any of these changes or additions are incorrect, please notify your healthcare provider.             Verify that the below list of medications is an accurate representation of the medications you are currently taking.  If none reported, the list may be blank. If incorrect, please contact your healthcare provider. Carry this list with you in case of emergency.           Current Medications     aspirin (ECOTRIN) 325 MG EC tablet Take 1 tablet by mouth Daily.    atorvastatin (LIPITOR) 40 MG tablet TAKE 1 TABLET BY MOUTH EVERY DAY    calcium-vitamin D 500-125 mg-unit tablet Take 1 tablet by mouth once daily.      FLUZONE HIGH-DOSE 2015-16, PF, 180 mcg/0.5 mL Syrg     gabapentin (NEURONTIN) 800 MG tablet Take 1 tablet (800 mg total) by mouth 3 (three) times daily.    levetiracetam (KEPPRA) 750 MG Tab Take 1 tablet (750 mg total) by mouth 2 (two) times daily.    levothyroxine (SYNTHROID) 137 MCG Tab tablet TAKE 1 TABLET BY MOUTH EVERY DAY    levothyroxine (SYNTHROID) 137 MCG Tab tablet TAKE 1 TABLET BY MOUTH EVERY DAY    memantine (NAMENDA) 10 MG Tab 1 BID    metoprolol succinate (TOPROL-XL) 25 MG 24 hr tablet TAKE ONE TABLET BY MOUTH EVERY DAY    phenobarbital (LUMINAL) 32.4 MG tablet Take 1 tablet (32.4 mg total) by mouth 3 (three) times daily.    vitamin D 185 MG Tab Take 185 mg by mouth once daily.           Clinical Reference Information           Vital Signs - Last Recorded  Most recent update: 1/30/2017 11:22 AM by Analia Mesa MA    BP Pulse Ht Wt BMI    (!) 194/103 90 5' 10" (1.778 m) 90.4 kg (199 lb 4.7 oz) 28.6 kg/m2      Blood Pressure          Most Recent Value    BP  (!)  194/103      Allergies as of 1/30/2017  "    Penicillins      Immunizations Administered on Date of Encounter - 1/30/2017     None      [facility] Sign-Up     Activating your [facility] account is as easy as 1-2-3!     1) Visit [email or web address], select Sign Up Now, enter this activation code and your date of birth, then select Next.  [email or web address]  Expires: 3/16/2017 11:45 AM      2) Create a username and password to use when you visit [facility] in the future and select a security question in case you lose your password and select Next.    3) Enter your e-mail address and click Sign Up!    Additional Information  If you have questions, please e-mail [email or web address] or call [phone] to talk to our [facility] staff. Remember, [facility] is NOT to be used for urgent needs. For medical emergencies, dial 911.

## 2017-03-06 NOTE — TELEPHONE ENCOUNTER
Phone call to notify the pharmacy that I gave the patient a printed 90 day Rx with 1 refill as he is also on phenobarbital.

## 2017-07-01 PROBLEM — S72.141A CLOSED INTERTROCHANTERIC FRACTURE OF RIGHT FEMUR: Status: ACTIVE | Noted: 2017-01-01

## 2017-07-01 NOTE — PLAN OF CARE
Outside Transfer Acceptance Note    Transferring Physician or Mid Level Provider/Speciality: Dr Jordon Farrell     Accepting Physician: Chiqui Padilla     Date of Acceptance: 07/01/2017     Code Status: Full    Transferring Facility/Hospital: [facility]    Reason for Transfer to [facility]: Right femoral neck fracture    Report from Transferring Physician or Mid-Level provider/Hospital course: 88 M with a PMH of HTN, AS s/p AV replacement, epilepsy (followed by neurology here), dementia and hypothyroidism. Pt fell at his nursing home today (likely due to seizure) and suffered a right femoral neck fracture.     To do list upon patient arrival: consult ortho    Please call extension [phone] upon patient arrival to floor for Hospital Medicine admit team assignment and for additional admit orders. If patient is coming from another [facility] facility please also call [phone] to inform the admit team/office that patient has arrived from the [facility] facility to the floor so patient can be evaluated.

## 2017-07-02 PROBLEM — S72.91XA FEMUR FRACTURE, RIGHT: Status: ACTIVE | Noted: 2017-01-01

## 2017-07-02 PROBLEM — R26.81 UNSTEADY GAIT: Status: ACTIVE | Noted: 2017-01-01

## 2017-07-02 PROBLEM — I26.92 ACUTE SADDLE PULMONARY EMBOLISM: Status: ACTIVE | Noted: 2017-01-01

## 2017-07-02 PROBLEM — J96.01 ACUTE RESPIRATORY FAILURE WITH HYPOXIA: Status: ACTIVE | Noted: 2017-01-01

## 2017-07-02 PROBLEM — I48.91 ATRIAL FIBRILLATION: Status: ACTIVE | Noted: 2017-01-01

## 2017-07-02 NOTE — HPI
87 yo M with h/o atrial fibrillation (not on coumadin), CAD s/p PCI, AS s/p porcine AVR 2011, seizures, dementia presenting from assisted living facility after unwitnessed fall and subsequent R intertrochanteric fracture.  Patient upon admission to hospital was found to be tachycardic and tachypnic with hypoxemia; CTA chest performed demonstrating saddle pulmonary embolus involving all lobes.  Was admitted to medical ICU; is on heparin gtt. Vascular surgery consulted for IVC filter secondary to need for IMN.  Patient somnolent at bedside, poor historian.

## 2017-07-02 NOTE — PROGRESS NOTES
Cardiology Progress Note  Attending Physician: Siva Gaspar MD  Hospital Day: 2    Subjective:   Interval History: Overnight patient had a CTA Chest which showed a massive saddle PE. Transferred to the CMICU under the critical care service, on NRB Mask, started on a heparin gtt. No complaints this AM.    Medications:   Continuous Infusions:   heparin (porcine) in D5W 17 Units/kg/hr (07/02/17 1100)       Scheduled Meds:   atorvastatin  40 mg Oral Daily    calcium-vitamin D3  1 tablet Oral BID    gabapentin  600 mg Oral TID    levetiracetam  750 mg Oral BID    memantine  10 mg Oral Daily    metoprolol tartrate  25 mg Oral BID    phenobarbital  30 mg Oral TID    senna-docusate 8.6-50 mg  1 tablet Oral BID    sodium chloride 0.9%  3 mL Intravenous Q8H    vitamin D  1,000 Units Oral Daily     PRN Meds:acetaminophen, albuterol-ipratropium 2.5mg-0.5mg/3mL, heparin (PORCINE), heparin (PORCINE), morphine, ondansetron, oxycodone, senna-docusate 8.6-50 mg  Objective:     Vitals:  Temp:  [97.1 °F (36.2 °C)-98.4 °F (36.9 °C)]   Pulse:  []   Resp:  [16-26]   BP: (110-183)/()   SpO2:  [77 %-99 %]  I/O's:    Intake/Output Summary (Last 24 hours) at 07/02/17 1217  Last data filed at 07/02/17 1100   Gross per 24 hour   Intake              156 ml   Output              460 ml   Net             -304 ml        Constitutional: NAD, conversant  HEENT: Sclera anicteric, PERRLA, EOMI  Neck: No JVD, no carotid bruits  CV: RRR, 2/6 systolic murmur at RUSB, normal S1/S2  Pulm: Few crackles  GI: Abdomen soft, NTND, +BS  Extremities: No LE edema, warm and well perfused  Skin: No ecchymosis, erythema, or ulcers  Psych: AOx3, appropriate affect  Neuro: No gross deficits    Labs:       Recent Labs  Lab 07/01/17  2046 07/02/17  0728    140   K 3.6 4.0    107   CO2 22* 19*   BUN 29* 29*   CREATININE 1.2 1.1   * 142*   ANIONGAP 12 14       Recent Labs  Lab 07/01/17  2046 07/02/17  0728   AST 52* 44*    ALT 23 25   ALKPHOS 122 110   BILITOT 0.7 0.6   ALBUMIN 3.4* 3.2*        Recent Labs  Lab 07/01/17 2045 07/02/17  1120   WBC 13.84* 12.88*   HGB 12.8* 12.8*   HCT 39.1* 39.5*   * 121*   GRAN 69.1  9.6* 65.9  8.5*       Recent Labs  Lab 07/01/17 2045   INR 1.0       Recent Labs  Lab 07/01/17 2005 07/01/17 2046   TROPONINI  --  0.248*   *  --       Micro:   Blood Cultures  No results found for: LABBLOO  Urine Cultures  Lab Results   Component Value Date    LABURIN NO GROWTH - FINAL REPORT. 08/20/2011       Imaging:   TTE (7/2/2017)  Technical Quality: This is a technically challenging study. There is poor endocardial definition.     General: The patient was in an irregularly irregular rhythm throughout the study.     Aorta: The aortic root is normal in size, measuring 2.7 cm at sinotubular junction and 3.2 cm at Sinuses of Valsalva. The proximal ascending aorta is normal in size, measuring 3.4 cm across.     Left Atrium: The left atrial volume index is severely enlarged, measuring 48.12 cc/m2.     Left Ventricle: The left ventricle is normal in size, with an end-diastolic diameter of 4.5 cm, and an end-systolic diameter of 3.6 cm. Posterior wall thickness is upper limit of normal in size, with the septum measuring 0.9 cm and the posterior wall measuring 1.1 cm across. Relative wall thickness was increased at 0.49, and the LV mass index was 89.1 g/m2 consistent with concentric remodeling. The following segments were mildly hypokinetic: mid anteroseptum, apical inferior wall. The following segments were moderately hypokinetic: apical septum. Left ventricular systolic function appears low normal to mildly depressed. Visually estimated ejection fraction is 50-55%. The LV Doppler derived stroke volume equals 51.0 ccs.     Diastolic indices: E wave velocity 0.5 m/s, E/A ratio 0.7,  msec., E/e' ratio(avg) 9. Diastolic function is indeterminate.     Right Atrium: The right atrium is mildly  enlarged, measuring 5.1 cm in length and 4.8 cm in width in the apical view.     Right Ventricle: The right ventricle is mildly enlarged measuring 4.3 cm at the base in the apical right ventricle-focused view. Global right ventricular systolic function appears moderately depressed. There is abnormal septal motion. Tricuspid annular plane systolic excursion (TAPSE) is .9 cm. Tissue Doppler-derived tricuspid annular peak systolic velocity (S prime) is 6.1 cm/s. The estimated PA systolic pressure is 32 mmHg.     Aortic Valve:  There is a bioprosthesis present in the aortic position. The peak velocity obtained across the aortic valve is 2.72 m/s, which translates to a peak gradient of 30 mmHg. The mean gradient is 16 mmHg. Using a left ventricular outflow tract diameter of 2.3 cm, a left ventricular outflow tract velocity time integral of 13 cm, and a peak instantaneous transvalvular velocity time integral of 48 cm, the effective prosthetic valve area is 1.06 cm2. The effective prosthetic valve area corrected for BSA is 0.53 cm2.     Tricuspid Valve:  There is mild tricuspid regurgitation.     IVC: IVC is normal in size and collapses > 50% with a sniff, suggesting normal right atrial pressure of 3 mmHg.     Intracavitary: There is no evidence of pericardial effusion, intracavity mass, thrombi, or vegetation.     CONCLUSIONS     1 - Low normal to mildly depressed left ventricular systolic function (EF 50-55%).     2 - Concentric remodeling.     3 - Wall motion abnormalities.     4 - Indeterminate LV diastolic function.     5 - Biatrial enlargement.     6 - Right ventricular enlargement with moderately depressed systolic function.     7 - Aortic valve prosthesis, effective prosthetic valve area corrected for BSA is 0.53 cm2.     8 - Mild tricuspid regurgitation.     9 - The estimated PA systolic pressure is 32 mmHg.     EF   Date Value Ref Range Status   07/02/2017 50 55 - 65    06/25/2014 65       EKG: NSR with 1st  degree A-V block, Incomplete RBBB, rate 82 bpm    Assessment:   88 y.o. gentleman transferred from OSH for R intertrochanteric femur fracture. Cardiology initially consulted for a pre-op evaluation, however now found to have a saddle PE.     Plan:   Saddle PE, currently HD stable, normotensive, however has a large clot burden with RV strain on TTE  - Continue heparin gtt per primary service, bridge to OAC once ok per surgery  - Patient is likely a high risk for bleeding if tPA is a consideration given femur fracture, but may be considered if patient has worsening hemodynamics  - May continue lopressor 25 mg PO BID    Patient seen and examined this morning. Thank you for the opportunity to participate in the care of this interesting patient. Discussed with Dr. Nuñez - staff attestation to follow. We will sign off, please call with questions.    Signed:  Andrés Lynn MD  Cardiology Fellow - PGY4  Pager: [phone]  7/2/2017 12:17 PM  I have personally taken the history and examined the patient and agree with the resident's note as stated above.  Needs intensive treatment of huge PE with RV involvement.

## 2017-07-02 NOTE — ASSESSMENT & PLAN NOTE
-Plans for surgery but likely wont proceed as patient not stable.  -Leave NPO for now.   -Have notified Ortho he is on Heparin drip.

## 2017-07-02 NOTE — ASSESSMENT & PLAN NOTE
-New onset likely from acute strain from saddle embolus  -On heparin drip  -Rate controlled and hemodynamically stable

## 2017-07-02 NOTE — CONSULTS
Cardiology History & Physical  Attending Physician: Chiqui Padilla MD  Chief Complaint: SOB with elevated troponon's      HPI:   88 y.o. gentleman transferred from OSH for R intertrochanteric femur fracture.Cardiology consulted for pre op evaluation.    Patient is very poor historian and couldn't provide history. He lives in assisting living and no family at bedside at time of my interview. Most information was obtained from EMR.Looks like patient was found on floor of assisting living facility. CT head at OSH negative.Patient walks with cane at baseline.Per ortho note daughter makes medical decisions for him.    He has Hx seizure, HLD, hypothyroidism, AS s/p AVR, HTN, dementia and Afib. Currently Sat 76% on 5 L. Per his nurse he arrived around 7 PM O2 sat<88 since then. Currently in afib at 90's with BP systolic>110. He denies any chest pain. On physical exam he has clear lungs with no LE edema.His  and Trop was 0.2.  ROS:    Unable to provide information   PMH:     Past Medical History:   Diagnosis Date    Aortic stenosis     BPH (benign prostatic hypertrophy)     HEARING LOSS     uses hearing aids    Hypertension     Hypothyroidism     Localization-related (focal) (partial) epilepsy and epileptic syndromes with complex partial seizures, without mention of intractable epilepsy     Memory loss     Other and unspecified hyperlipidemia     Scoliosis (and kyphoscoliosis), idiopathic     Seizures     Stroke      Past Surgical History:   Procedure Laterality Date    AORTIC VALVE REPLACEMENT  August 2011    CARDIAC CATHETERIZATION  6/11    mild non-obstructive CAD    CARDIAC VALVE SURGERY  8/11    23 mm procine AVR    CATARACT EXTRACTION, BILATERAL      CHOLECYSTECTOMY      HEMORRHOID SURGERY       Allergies:     Review of patient's allergies indicates:   Allergen Reactions    Penicillins      Other reaction(s): Hives     Medications:     No current facility-administered medications on  file prior to encounter.      Current Outpatient Prescriptions on File Prior to Encounter   Medication Sig Dispense Refill    aspirin (ECOTRIN) 325 MG EC tablet Take 1 tablet by mouth Daily.      atorvastatin (LIPITOR) 40 MG tablet TAKE 1 TABLET BY MOUTH EVERY DAY 90 tablet 11    calcium-vitamin D 500-125 mg-unit tablet Take 1 tablet by mouth once daily.        FLUZONE HIGH-DOSE 2015-16, PF, 180 mcg/0.5 mL Syrg   0    gabapentin (NEURONTIN) 800 MG tablet Take 1 tablet (800 mg total) by mouth 3 (three) times daily. 270 tablet 1    levetiracetam (KEPPRA) 750 MG Tab Take 1 tablet (750 mg total) by mouth 2 (two) times daily. 180 tablet 1    levothyroxine (SYNTHROID) 137 MCG Tab tablet TAKE 1 TABLET BY MOUTH EVERY DAY 30 tablet 0    levothyroxine (SYNTHROID) 137 MCG Tab tablet TAKE 1 TABLET BY MOUTH EVERY DAY 90 tablet 0    memantine (NAMENDA) 10 MG Tab 1  tablet 1    metoprolol succinate (TOPROL-XL) 25 MG 24 hr tablet TAKE ONE TABLET BY MOUTH EVERY DAY 90 tablet 0    phenobarbital (LUMINAL) 32.4 MG tablet Take 1 tablet (32.4 mg total) by mouth 3 (three) times daily. 270 tablet 1    vitamin D 185 MG Tab Take 185 mg by mouth once daily.         Inpatient Medications   Continuous Infusions:   Scheduled Meds:   atorvastatin  40 mg Oral Daily    calcium-vitamin D3  1 tablet Oral BID    gabapentin  600 mg Oral TID    levetiracetam  750 mg Oral BID    levothyroxine  137 mcg Oral Daily    memantine  10 mg Oral Daily    metoprolol succinate  25 mg Oral Daily    phenobarbital  30 mg Oral TID    senna-docusate 8.6-50 mg  1 tablet Oral BID    sodium chloride 0.9%  3 mL Intravenous Q8H    vitamin D  1,000 Units Oral Daily     PRN Meds:acetaminophen, albuterol-ipratropium 2.5mg-0.5mg/3mL, morphine, ondansetron, oxycodone, senna-docusate 8.6-50 mg     Social History:     Social History   Substance Use Topics    Smoking status: Never Smoker    Smokeless tobacco: Never Used    Alcohol use No     Family  History:     Family History   Problem Relation Age of Onset    COPD Father     COPD Brother      Physical Exam:     Vitals:  Temp:  [97.1 °F (36.2 °C)-97.5 °F (36.4 °C)]   Pulse:  []   Resp:  [16-20]   BP: (132-181)/(77-84)   SpO2:  [77 %-87 %]  on 5L I/O's:  No intake or output data in the 24 hours ending 07/02/17 0008     Constitutional: Appearing dyspneic  HEENT: Sclera anicteric, PERRLA, EOMI  Neck: No JVD, no carotid bruits  CV: Irregularly irregular, no murmur, normal S1/S2  Pulm: CTAB, no wheezes, rales, or ronchi  GI: Abdomen soft, NTND, +BS  Extremities: No LE edema, warm and well perfused  Skin: No ecchymosis, erythema, or ulcers  Psych: AOx3, appropriate affect  Neuro: CNII-XII intact, no focal deficits    Labs:       Recent Labs  Lab 07/01/17 2046      K 3.6      CO2 22*   BUN 29*   CREATININE 1.2   ANIONGAP 12       Recent Labs  Lab 07/01/17 2046   AST 52*   ALT 23   ALKPHOS 122   BILITOT 0.7   ALBUMIN 3.4*       Recent Labs  Lab 07/01/17 2005 07/01/17 2046   TROPONINI  --  0.248*   *  --       Recent Labs  Lab 07/01/17 2045   WBC 13.84*   HGB 12.8*   HCT 39.1*   *   GRAN 69.1  9.6*       Recent Labs  Lab 07/01/17 2045   INR 1.0     Lab Results   Component Value Date    CHOL 277 (H) 12/23/2014    HDL 56 12/23/2014    LDLCALC 198.2 (H) 12/23/2014    TRIG 114 12/23/2014     Lab Results   Component Value Date    HGBA1C 5.6 08/16/2011        Micro:  Blood Cultures  No results found for: LABBLOO  Urine Cultures  Lab Results   Component Value Date    LABURIN NO GROWTH - FINAL REPORT. 08/20/2011       Imaging:         EF   Date Value Ref Range Status   06/25/2014 65         EKG: AFIB  Telemetry: Afib    Assessment&Plan:   88 y.o. gentleman transferred from OSH for R intertrochanteric femur fracture.Cardiology consulted for pre op evaluation:  -Recommend CTA to rule out PE as its the most likely diagnosis now   -[id number] at least 3 he need to be on therapeutic  heparin as soon as possible   -If CTA is negative elevated troponin may be in setting of hypoxia and afib so mostly demand ischemia, would Not recommend starting patein on ACS protocol  -Lopressor 25 PO BID fo rhythm control   -Titrate O2 to sat>92%  -2D echo  -Patient is unstable for surgery now  -Recommend transferring patient to ICU  -Plan was discussed with Primary       Plan was discussed with Dr Win Carson MD  Cardiology Fellow  Pager: [phone]      I have personally taken the history and examined the patient and agree with the resident's note as stated above.  Need to R/O PE , get CFD, and treat hypoxemia, probably in ICU? Later needs to be on NOAC for DVT and AF ( would be off label since he has AVR) or warfarin , which may be very difficult to coordinate.

## 2017-07-02 NOTE — SUBJECTIVE & OBJECTIVE
Prescriptions Prior to Admission   Medication Sig Dispense Refill Last Dose    aspirin (ECOTRIN) 325 MG EC tablet Take 1 tablet by mouth Daily.   Taking    atorvastatin (LIPITOR) 40 MG tablet TAKE 1 TABLET BY MOUTH EVERY DAY 90 tablet 11 Taking    calcium-vitamin D 500-125 mg-unit tablet Take 1 tablet by mouth once daily.     Taking    FLUZONE HIGH-DOSE 2015-16, PF, 180 mcg/0.5 mL Syrg   0 Taking    gabapentin (NEURONTIN) 800 MG tablet Take 1 tablet (800 mg total) by mouth 3 (three) times daily. 270 tablet 1     levetiracetam (KEPPRA) 750 MG Tab Take 1 tablet (750 mg total) by mouth 2 (two) times daily. 180 tablet 1     levothyroxine (SYNTHROID) 137 MCG Tab tablet TAKE 1 TABLET BY MOUTH EVERY DAY 30 tablet 0 Taking    levothyroxine (SYNTHROID) 137 MCG Tab tablet TAKE 1 TABLET BY MOUTH EVERY DAY 90 tablet 0 Taking    memantine (NAMENDA) 10 MG Tab 1  tablet 1     metoprolol succinate (TOPROL-XL) 25 MG 24 hr tablet TAKE ONE TABLET BY MOUTH EVERY DAY 90 tablet 0 Taking    phenobarbital (LUMINAL) 32.4 MG tablet Take 1 tablet (32.4 mg total) by mouth 3 (three) times daily. 270 tablet 1     vitamin D 185 MG Tab Take 185 mg by mouth once daily.   Taking       Review of patient's allergies indicates:   Allergen Reactions    Penicillins      Other reaction(s): Hives       Past Medical History:   Diagnosis Date    Aortic stenosis     BPH (benign prostatic hypertrophy)     HEARING LOSS     uses hearing aids    Hypertension     Hypothyroidism     Localization-related (focal) (partial) epilepsy and epileptic syndromes with complex partial seizures, without mention of intractable epilepsy     Memory loss     Other and unspecified hyperlipidemia     Scoliosis (and kyphoscoliosis), idiopathic     Seizures     Stroke      Past Surgical History:   Procedure Laterality Date    AORTIC VALVE REPLACEMENT  August 2011    CARDIAC CATHETERIZATION  6/11    mild non-obstructive CAD    CARDIAC VALVE SURGERY   8/11    23 mm procine AVR    CATARACT EXTRACTION, BILATERAL      CHOLECYSTECTOMY      HEMORRHOID SURGERY       Family History     Problem Relation (Age of Onset)    COPD Father, Brother        Social History Main Topics    Smoking status: Never Smoker    Smokeless tobacco: Never Used    Alcohol use No    Drug use: No    Sexual activity: Not on file     Review of Systems   Unable to perform ROS: Age     Objective:     Vital Signs (Most Recent):  Temp: 98.4 °F (36.9 °C) (07/02/17 1100)  Pulse: 92 (07/02/17 1100)  Resp: 18 (07/02/17 1100)  BP: 110/62 (07/02/17 1100)  SpO2: (!) 94 % (07/02/17 1100) Vital Signs (24h Range):  Temp:  [97.1 °F (36.2 °C)-98.4 °F (36.9 °C)] 98.4 °F (36.9 °C)  Pulse:  [] 92  Resp:  [16-26] 18  SpO2:  [77 %-99 %] 94 %  BP: (110-183)/() 110/62     Weight: 91.6 kg (202 lb)  Body mass index is 27.4 kg/m².    Physical Exam   Constitutional: He appears well-developed and well-nourished. He appears lethargic.   HENT:   Head: Normocephalic and atraumatic.   Eyes: EOM are normal. Pupils are equal, round, and reactive to light.   Neck: No tracheal deviation present.   Cardiovascular: An irregularly irregular rhythm present.   Pulses:       Femoral pulses are 2+ on the right side, and 2+ on the left side.  Pulmonary/Chest: Effort normal. No respiratory distress. He has no wheezes.   Abdominal: Soft. He exhibits distension. There is no tenderness.   Musculoskeletal: He exhibits no edema.   Tenderness to palpation of right femur   Neurological: He appears lethargic.   Skin: Skin is warm and dry.       Significant Labs:  CBC:   Recent Labs  Lab 07/01/17  2045   WBC 13.84*   RBC 4.22*   HGB 12.8*   HCT 39.1*   *   MCV 93   MCH 30.3   MCHC 32.7     CMP:   Recent Labs  Lab 07/02/17  0728   *   CALCIUM 8.7   ALBUMIN 3.2*   PROT 7.2      K 4.0   CO2 19*      BUN 29*   CREATININE 1.1   ALKPHOS 110   ALT 25   AST 44*   BILITOT 0.6     Coagulation:   Recent Labs  Lab  07/01/17  2045   LABPROT 10.9   INR 1.0       Significant Diagnostics:  CTA Chest Non Coronary [[id number]] Resulted: 07/02/17 0217   Order Status: Completed Updated: 07/02/17 0218   Narrative:     CTA chest noncoronary    Clinical history: New onset A. fib    Technique:  Axial images of the thorax were obtained at 3 mm intervals during administration of 75 cc Omni 350 IV contrast following CTA chest noncoronary portal.    Findings:      The structures of the base of the neck are grossly unremarkable.  No significant mediastinal lymphadenopathy.  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  The heart is mildly prominent.  There is atherosclerotic calcification in the distribution of the coronary arteries.  No significant pericardial effusion.  Limited evaluation of the spleen and liver are grossly unremarkable.  There is reflux of contrast within the hepatic veins, suggesting elevated right heart pressures.  There are bilateral adrenal nodules, incompletely characterized noting attenuation of the right nodule is suggestive for adenoma, the left is nonspecific.  There is a hiatal hernia, small.    The airways are grossly patent noting limited evaluation secondary to respiratory motion.    There is bilateral basilar dependent atelectasis.  There is no pneumothorax.  No significant volume of pleural fluid.  There is mild left pleural thickening.    Bolus timing is adequate for the evaluation of pulmonary thromboembolism.  There is extensive pulmonary thromboembolism, noting saddle embolus.  Embolus extends from the main pulmonary artery trunk, into the bilateral left and right pulmonary arteries, and extends distally to several lobar, segmental, and subsegmental branches involving all lobes.  There is expansion of the right ventricle, minimal internal volume of the left ventricle suggesting elevated right heart pressure and right heart strain.    Degenerative changes are noted  of the thoracic spine without focal osseous destructive process.  Sternotomy noted.    No significant axillary lymphadenopathy.   Impression:       1.  Extensive pulmonary saddle embolus, involving all lobes, described in detail above with associated right heart strain.    2.  Several additional findings above.    Findings were discussed with Toro levi at 02:06:16 07/02/17

## 2017-07-02 NOTE — PROGRESS NOTES
Notified [facility] service of BP 74/48 with map of 57. HR 81 afib. Sats 92% on 5L nasal cannula. Pt denies dizziness or light headedness. Urine output approximately 10mls/hr for past 2 hours. Orders received for 500 ml bolus. Will continue to monitor closely.

## 2017-07-02 NOTE — H&P
[facility]  Critical Care Medicine  History & Physical    Patient Name: Virgil Melo Jr.  MRN: [id number]  Admission Date: 7/1/2017  Hospital Length of Stay: 1 days  Code Status: No Order  Attending Physician: Siva Gaspar MD   Primary Care Provider: Luis A Milton MD   Principal Problem: Acute saddle pulmonary embolism    Subjective:     HPI:  88 year old man with PMH of AS s/p AVR, Afib, HTN, HLD, presents hypoxic respiratory failure.     Patient found on floor of assisted living facility night of 6/30/17. Fall was not witnessed. Patient does not remember fall. CT head at OSH negative. Transferred to [facility] for evaluation and operative fixation R intertrochanteric fracture.     Patient was desaturating to 76% on 5L nasal cannula, with afib in 90's, and BP systolic>110.He was then placed on nonrebreather and sat 91% at 1209 am, but continues to have ABG PCO2 32.6, PO2 59, HCO3 21.5 O2 91.     No chest pain.     Daughter is medical decision maker.      Hospital/ICU Course:  No notes on file     Past Medical History:   Diagnosis Date    Aortic stenosis     BPH (benign prostatic hypertrophy)     HEARING LOSS     uses hearing aids    Hypertension     Hypothyroidism     Localization-related (focal) (partial) epilepsy and epileptic syndromes with complex partial seizures, without mention of intractable epilepsy     Memory loss     Other and unspecified hyperlipidemia     Scoliosis (and kyphoscoliosis), idiopathic     Seizures     Stroke        Past Surgical History:   Procedure Laterality Date    AORTIC VALVE REPLACEMENT  August 2011    CARDIAC CATHETERIZATION  6/11    mild non-obstructive CAD    CARDIAC VALVE SURGERY  8/11    23 mm procine AVR    CATARACT EXTRACTION, BILATERAL      CHOLECYSTECTOMY      HEMORRHOID SURGERY         Review of patient's allergies indicates:   Allergen Reactions    Penicillins      Other reaction(s): Hives       Family History     Problem Relation  (Age of Onset)    COPD Father, Brother        Social History Main Topics    Smoking status: Never Smoker    Smokeless tobacco: Never Used    Alcohol use No    Drug use: No    Sexual activity: Not on file      Review of Systems   Respiratory: Positive for shortness of breath. Negative for cough, choking and chest tightness.    Cardiovascular: Negative for chest pain, palpitations and leg swelling.   Gastrointestinal: Negative for abdominal distention, abdominal pain, nausea and vomiting.   Genitourinary: Negative for difficulty urinating, dysuria, flank pain and hematuria.     Objective:     Vital Signs (Most Recent):  Temp: 97.5 °F (36.4 °C) (07/01/17 2312)  Pulse: 84 (07/02/17 0019)  Resp: (!) 26 (07/02/17 0019)  BP: (!) 140/81 (07/02/17 0019)  SpO2: (!) 91 % (07/02/17 0019) Vital Signs (24h Range):  Temp:  [97.1 °F (36.2 °C)-97.5 °F (36.4 °C)] 97.5 °F (36.4 °C)  Pulse:  [] 84  Resp:  [16-26] 26  SpO2:  [77 %-91 %] 91 %  BP: (132-181)/(77-84) 140/81      There is no height or weight on file to calculate BMI.    No intake or output data in the 24 hours ending 07/02/17 0207    Physical Exam   Constitutional: He appears distressed.   Eyes: EOM are normal. Pupils are equal, round, and reactive to light.   Cardiovascular: Regular rhythm and normal heart sounds.    Pulmonary/Chest: Breath sounds normal. He is in respiratory distress. He has no wheezes. He has no rales. He exhibits no tenderness.   Neurological:   Mild confusion- thinks he is in nursing home   Skin: Skin is warm and dry. No rash noted. He is not diaphoretic. No erythema.       Vents:     Lines/Drains/Airways          No matching active lines, drains, or airways        Significant Labs:    CBC/Anemia Profile:    Recent Labs  Lab 07/01/17 2045   WBC 13.84*   HGB 12.8*   HCT 39.1*   *   MCV 93   RDW 14.4        Chemistries:    Recent Labs  Lab 07/01/17 2046      K 3.6      CO2 22*   BUN 29*   CREATININE 1.2   CALCIUM 9.0    ALBUMIN 3.4*   PROT 7.2   BILITOT 0.7   ALKPHOS 122   ALT 23   AST 52*   MG 2.1   PHOS 3.1       ABGs:   Recent Labs  Lab 07/02/17  0015   PH 7.427   PCO2 32.6*   HCO3 21.5*   POCSATURATED 91*   BE -3     BMP:   Recent Labs  Lab 07/01/17 2046   *      K 3.6      CO2 22*   BUN 29*   CREATININE 1.2   CALCIUM 9.0   MG 2.1     POCT Glucose: No results for input(s): POCTGLUCOSE in the last 48 hours.  Troponin:   Recent Labs  Lab 07/01/17 2046   TROPONINI 0.248*     All pertinent labs within the past 24 hours have been reviewed.    Significant Imaging: CT: I have reviewed all pertinent results/findings within the past 24 hours and my personal findings are:  Extensive pulmonary emboli in Right and Upper Lobes with associated Right Heart Strain    Assessment/Plan:     Neuro   Dementia, vascular    Mental status is stable, continue home medications.         Partial epilepsy with impairment of consciousness    Continue Keppra        Pulmonary   * Acute saddle pulmonary embolism    - Large saddle pulmonary embolus with right heart strain found on CTA causing hypoxic respiratory failure. Likely provoked from recent R. Hip fracture.   - Heparin drip   - No acute indication for TPA given hemodynamic stability          Cardiac   Hypertension    -Patient with history of hypertension, well- controlled in hospital.        Atrial fibrillation    -New onset likely from acute strain from saddle embolus  -On heparin drip  -Rate controlled and hemodynamically stable        Endocrine   Hypothyroid    Continue synthroid.        Musculoskeletal and Integument   Closed intertrochanteric fracture of right femur    -Plans for surgery but likely wont proceed as patient not stable.  -Leave NPO for now.   -Have notified Ortho he is on Heparin drip.             Critical Care Medicine Daily Checklist:    A: Awake: RASS Goal/Actual Goal:    Actual:     B: Spontaneous Breathing Trial Performed?     C: SAT & SBT Coordinated?  N/a                        D: Delirium: CAM-ICU     E: Early Mobility Performed? Yes   F: Feeding Goal:    Status:     Current Diet Order   Procedures    Diet NPO Except for: Sips with Medication     Order Specific Question:   Except for     Answer:   Sips with Medication      AS: Analgesia/Sedation None   T: Thromboembolic Prophylaxis Heparin drip   H: HOB > 300 Yes   U: Stress Ulcer Prophylaxis (if needed) Not indicated   G: Glucose Control Controlled   B: Bowel Function     I: Indwelling Catheter (Lines & Archer) Necessity PIV   D: De-escalation of Antimicrobials/Pharmacotherapies N.A    Plan for the day/ETD Monitor    Code Status:  Family/Goals of Care: No Order  Code discussion       Critical secondary to Patient has a condition that poses threat to life and bodily function: Pulmonary Embolism     Critical care was time spent personally by me on the following activities: development of treatment plan with patient or surrogate and bedside caregivers, discussions with consultants, evaluation of patient's response to treatment, examination of patient, ordering and performing treatments and interventions, ordering and review of laboratory studies, ordering and review of radiographic studies, pulse oximetry, re-evaluation of patient's condition. This critical care time did not overlap with that of any other provider or involve time for any procedures.     Juan Grijalva MD  Critical Care Medicine  [facility]

## 2017-07-02 NOTE — ASSESSMENT & PLAN NOTE
7 yo M with h/o atrial fibrillation (not on coumadin), CAD s/p PCI, AS s/p porcine AVR 2011 presenting with saddle pulmonary embolus and R intertrochanteric fracture.    Patient currently saturating 95 % on 5 LNC  On heparin gtt  Has ECHO and US venous duplex pending  Recommendations regarding IVCF placement pending ECHO  Will d/w Dr. Bowens

## 2017-07-02 NOTE — NURSING
Cardiology to Bedside at 2350,, pt. Oxygen Sats remain below 90, Dr. Trista morataya then called, Pt. Vital signs as charted, New orders received, Respiratory notified, at bedside at 0011 for ABG, will continue to monitor.

## 2017-07-02 NOTE — ANESTHESIA PREPROCEDURE EVALUATION
07/01/2017  Virgil Melo Jr. is a 88 y.o., male with h/o dementia, seizure d/o, HLD, hypothyroidism, AS s/p AVR, HTN, dementia, Afib presents as transfer from OSH with R intertrochanteric femur fracture who presents for:  Of not patient has a DVt with saddle PE on therapeutic heparin.  He received IVC filter earlier today under MAC anesthesia.  Tolerated low rate propofol infusion.  He was able to lay flat with supplemental oxygen asymptomatic.      Pre-operative evaluation for Procedure(s) (LRB):  IM NAILING OF FEMUR - RIGHT (Right)    Diagnostic Studies:  7/1/17 Pelvis xray  1.  No acute displaced fracture or dislocation of the pelvis, please see separately dictated report for details of the intertrochanteric region of the right femur.    7/1/17 CXR  Patchy increased interstitial and parenchymal attenuation bilaterally, slightly more focal overlying the right lower lung zone, could reflect edema or infection, correlation advised.  Followup recommended.    7/2/17 EKG:  Sinus rhythm with 1st degree A-V block with Blocked Premature atrial  complexes  Incomplete right bundle branch block  Low septal forces  Abnormal ECG  When compared with ECG of 01-JUL-2017 20:58,  Sinus rhythm has replaced Atrial fibrillation  T wave inversion now evident in Anterior leads  Confirmed by Isaias MARCIAL MD ([id number]) on 7/2/2017 10:42:47 AM    7/2/17 2D Echo:  CONCLUSIONS     1 - Low normal to mildly depressed left ventricular systolic function (EF 50-55%).     2 - Concentric remodeling.     3 - Wall motion abnormalities.     4 - Indeterminate LV diastolic function.     5 - Biatrial enlargement.     6 - Right ventricular enlargement with moderately depressed systolic function.     7 - Aortic valve prosthesis, effective prosthetic valve area corrected for BSA is 0.53 cm2.     8 - Mild tricuspid regurgitation.     9 - The estimated PA  systolic pressure is 32 mmHg.         Access:          PIV      Patient Active Problem List   Diagnosis    Aortic stenosis    Localization-related (focal) (partial) epilepsy and epileptic syndromes with complex partial seizures, without mention of intractable epilepsy    Scoliosis (and kyphoscoliosis), idiopathic    Hypertension    BPH (benign prostatic hypertrophy)    Dementia, vascular    Peripheral vascular disease    S/P AVR (aortic valve replacement)    Hyperlipidemia    Osteoporosis, senile    Posterior vitreous detachment    Pseudophakia of both eyes    Macular degeneration - Both Eyes    Partial epilepsy    Paroxysmal ventricular tachycardia    Hypothyroid    Unstable balance    Seizure disorder    Memory loss    Closed intertrochanteric fracture of right femur       Review of patient's allergies indicates:   Allergen Reactions    Penicillins      Other reaction(s): Hives        No current facility-administered medications on file prior to encounter.      Current Outpatient Prescriptions on File Prior to Encounter   Medication Sig Dispense Refill    aspirin (ECOTRIN) 325 MG EC tablet Take 1 tablet by mouth Daily.      atorvastatin (LIPITOR) 40 MG tablet TAKE 1 TABLET BY MOUTH EVERY DAY 90 tablet 11    calcium-vitamin D 500-125 mg-unit tablet Take 1 tablet by mouth once daily.        FLUZONE HIGH-DOSE 2015-16, PF, 180 mcg/0.5 mL Syrg   0    gabapentin (NEURONTIN) 800 MG tablet Take 1 tablet (800 mg total) by mouth 3 (three) times daily. 270 tablet 1    levetiracetam (KEPPRA) 750 MG Tab Take 1 tablet (750 mg total) by mouth 2 (two) times daily. 180 tablet 1    levothyroxine (SYNTHROID) 137 MCG Tab tablet TAKE 1 TABLET BY MOUTH EVERY DAY 30 tablet 0    levothyroxine (SYNTHROID) 137 MCG Tab tablet TAKE 1 TABLET BY MOUTH EVERY DAY 90 tablet 0    memantine (NAMENDA) 10 MG Tab 1  tablet 1    metoprolol succinate (TOPROL-XL) 25 MG 24 hr tablet TAKE ONE TABLET BY MOUTH EVERY DAY  90 tablet 0    phenobarbital (LUMINAL) 32.4 MG tablet Take 1 tablet (32.4 mg total) by mouth 3 (three) times daily. 270 tablet 1    vitamin D 185 MG Tab Take 185 mg by mouth once daily.         Past Surgical History:   Procedure Laterality Date    AORTIC VALVE REPLACEMENT  August 2011    CARDIAC CATHETERIZATION  6/11    mild non-obstructive CAD    CARDIAC VALVE SURGERY  8/11    23 mm procine AVR    CATARACT EXTRACTION, BILATERAL      CHOLECYSTECTOMY      HEMORRHOID SURGERY         Social History     Social History    Marital status:      Spouse name: N/A    Number of children: N/A    Years of education: N/A     Occupational History    Not on file.     Social History Main Topics    Smoking status: Never Smoker    Smokeless tobacco: Never Used    Alcohol use No    Drug use: No    Sexual activity: Not on file     Other Topics Concern    Not on file     Social History Narrative    No narrative on file         Vital Signs Range (Last 24H):  Temp:  [36.2 °C (97.1 °F)]   Pulse:  [114]   Resp:  [16]   BP: (181)/(84)   SpO2:  [83 %]       Lab Results   Component Value Date    WBC 10.96 07/03/2017    HGB 10.9 (L) 07/03/2017    HCT 33.9 (L) 07/03/2017    MCV 94 07/03/2017     (L) 07/03/2017       Chemistry        Component Value Date/Time     07/03/2017 0209    K 3.5 07/03/2017 0209     (H) 07/03/2017 0209    CO2 19 (L) 07/03/2017 0209    BUN 39 (H) 07/03/2017 0209    CREATININE 1.2 07/03/2017 0209     (H) 07/03/2017 0209        Component Value Date/Time    CALCIUM 7.9 (L) 07/03/2017 0209    ALKPHOS 86 07/03/2017 0209    AST 28 07/03/2017 0209    ALT 20 07/03/2017 0209    BILITOT 0.5 07/03/2017 0209    ESTGFRAFRICA >60.0 07/03/2017 0209    EGFRNONAA 53.7 (A) 07/03/2017 0209          INR  Recent Labs      07/01/17   2045   INR  1.0           Anesthesia Evaluation    I have reviewed the Patient Summary Reports.     I have reviewed the Medications.     Review of  Systems  Anesthesia Hx:  No problems with previous Anesthesia Denies Hx of Anesthetic complications  History of prior surgery of interest to airway management or planning:  Denies Personal Hx of Anesthesia complications.   Social:  Non-Smoker, No Alcohol Use    Hematology/Oncology:     Oncology Normal    -- Anemia:   EENT/Dental:EENT/Dental Normal   Cardiovascular:   Exercise tolerance: good Hypertension Valvular problems/Murmurs (s/p AVR), AS ECG has been reviewed.    Pulmonary:  Pulmonary Normal    Renal/:  Renal/ Normal     Hepatic/GI:  Hepatic/GI Normal    Musculoskeletal:  Musculoskeletal Normal R intertrochanteric femur fracture   Neurological:   CVA Seizures  Dementia    Endocrine:   Hypothyroidism    Psych:  Psychiatric Normal           Physical Exam  General:  Well nourished    Airway/Jaw/Neck:  Airway Findings: Mouth Opening: Normal Tongue: Normal  General Airway Assessment: Adult  Oropharynx Findings: Normal Mallampati: II  TM Distance: Normal, at least 6 cm  Jaw/Neck Findings:  Neck ROM: Normal ROM  Neck Findings: Normal    Eyes/Ears/Nose:  EYES/EARS/NOSE FINDINGS: Normal   Dental:  Dental Findings: In tact   Chest/Lungs:  Chest/Lungs Findings: Normal Respiratory Rate     Heart/Vascular:  Heart Findings: Rate: Normal        Mental Status:  Mental Status Findings:  Cooperative, Alert and Oriented         Anesthesia Plan  Type of Anesthesia, risks & benefits discussed:  Anesthesia Type:  general, regional  Patient's Preference:   Intra-op Monitoring Plan: standard ASA monitors and arterial line  Intra-op Monitoring Plan Comments:   Post Op Pain Control Plan:   Post Op Pain Control Plan Comments:   Induction:   IV  Beta Blocker:  Patient is on a Beta-Blocker and has received one dose within the past 24 hours (No further documentation required).       Informed Consent: Patient understands risks and agrees with Anesthesia plan.  Questions answered. Anesthesia consent signed with patient.  ASA Score: 4      Day of Surgery Review of History & Physical:    H&P update referred to the surgeon.         Ready For Surgery From Anesthesia Perspective.

## 2017-07-02 NOTE — SUBJECTIVE & OBJECTIVE
Principal Problem:Acute saddle pulmonary embolism    Principal Orthopedic Problem: R intertroch fx    Interval History: Patient seen and examined at bedside.  Pain controlled.  Pt hypoxic and found to have saddle embolus.  Currently in ICU on NRB with adequate sats, heparin gtt.      Review of patient's allergies indicates:   Allergen Reactions    Penicillins      Other reaction(s): Hives       Current Facility-Administered Medications   Medication    acetaminophen tablet 650 mg    albuterol-ipratropium 2.5mg-0.5mg/3mL nebulizer solution 3 mL    atorvastatin tablet 40 mg    calcium-vitamin D3 500 mg(1,250mg) -200 unit per tablet 1 tablet    gabapentin capsule 600 mg    heparin 25,000 units in dextrose 5% 250 mL (100 units/mL) bolus from bag; ADDITIONAL PRN BOLUS    heparin 25,000 units in dextrose 5% 250 mL (100 units/mL) bolus from bag; ADDITIONAL PRN BOLUS    heparin 25,000 units in dextrose 5% 250 mL (100 units/mL) infusion    levetiracetam tablet 750 mg    memantine tablet 10 mg    metoprolol tartrate (LOPRESSOR) tablet 25 mg    morphine injection 4 mg    ondansetron injection 4 mg    oxycodone immediate release tablet 5 mg    phenobarbital tablet 30 mg    senna-docusate 8.6-50 mg per tablet 1 tablet    senna-docusate 8.6-50 mg per tablet 2 tablet    sodium chloride 0.9% flush 3 mL    vitamin D 1000 units tablet 1,000 Units     Objective:     Vital Signs (Most Recent):  Temp: 97.7 °F (36.5 °C) (07/02/17 0315)  Pulse: 85 (07/02/17 0701)  Resp: 19 (07/02/17 0701)  BP: 110/67 (07/02/17 0701)  SpO2: 98 % (07/02/17 0701) Vital Signs (24h Range):  Temp:  [97.1 °F (36.2 °C)-97.7 °F (36.5 °C)] 97.7 °F (36.5 °C)  Pulse:  [] 85  Resp:  [16-26] 19  SpO2:  [77 %-98 %] 98 %  BP: (110-183)/() 110/67     Weight: 91.6 kg (202 lb)  Height: 6' (182.9 cm)  Body mass index is 27.4 kg/m².      Intake/Output Summary (Last 24 hours) at 07/02/17 0726  Last data filed at 07/02/17 0600   Gross per 24 hour    Intake             85.5 ml   Output              340 ml   Net           -254.5 ml       Ortho/SPM Exam   AAOx4  NAD  RRR  No increased WOB  SILT and motor intact T/SP/DP  WWP extremities    Significant Labs: All pertinent labs within the past 24 hours have been reviewed.    Significant Imaging: I have reviewed and interpreted all pertinent imaging results/findings.

## 2017-07-02 NOTE — SUBJECTIVE & OBJECTIVE
Past Medical History:   Diagnosis Date    Aortic stenosis     BPH (benign prostatic hypertrophy)     HEARING LOSS     uses hearing aids    Hypertension     Hypothyroidism     Localization-related (focal) (partial) epilepsy and epileptic syndromes with complex partial seizures, without mention of intractable epilepsy     Memory loss     Other and unspecified hyperlipidemia     Scoliosis (and kyphoscoliosis), idiopathic     Seizures     Stroke        Past Surgical History:   Procedure Laterality Date    AORTIC VALVE REPLACEMENT  August 2011    CARDIAC CATHETERIZATION  6/11    mild non-obstructive CAD    CARDIAC VALVE SURGERY  8/11    23 mm procine AVR    CATARACT EXTRACTION, BILATERAL      CHOLECYSTECTOMY      HEMORRHOID SURGERY         Review of patient's allergies indicates:   Allergen Reactions    Penicillins      Other reaction(s): Hives       Current Facility-Administered Medications   Medication    acetaminophen tablet 650 mg    albuterol-ipratropium 2.5mg-0.5mg/3mL nebulizer solution 3 mL    [START ON 7/2/2017] atorvastatin tablet 40 mg    calcium-vitamin D3 500 mg(1,250mg) -200 unit per tablet 1 tablet    gabapentin capsule 600 mg    levetiracetam tablet 750 mg    [START ON 7/2/2017] levothyroxine tablet 137 mcg    [START ON 7/2/2017] memantine tablet 10 mg    metoprolol injection 5 mg    [START ON 7/2/2017] metoprolol succinate (TOPROL-XL) 24 hr tablet 25 mg    morphine injection 4 mg    ondansetron injection 4 mg    oxycodone immediate release tablet 5 mg    phenobarbital tablet 30 mg    senna-docusate 8.6-50 mg per tablet 1 tablet    senna-docusate 8.6-50 mg per tablet 2 tablet    sodium chloride 0.9% flush 3 mL    [START ON 7/2/2017] vitamin D 1000 units tablet 1,000 Units     Family History     Problem Relation (Age of Onset)    COPD Father, Brother        Social History Main Topics    Smoking status: Never Smoker    Smokeless tobacco: Never Used    Alcohol use No     Drug use: No    Sexual activity: Not on file     ROS   Constitutional: negative for fevers  Eyes: no visual changes  ENT: negative for hearing loss  Respiratory: negative for dyspnea  Cardiovascular: negative for chest pain  Gastrointestinal: posative for abdominal pain  Genitourinary: negative for dysuria  Neurological: negative for headaches  Behavioral/Psych: negative for hallucinations  Endocrine: negative for temperature intolerance    Objective:     Vital Signs (Most Recent):  Temp: 97.1 °F (36.2 °C) (07/01/17 2020)  Pulse: (!) 114 (07/01/17 2020)  Resp: 16 (07/01/17 2020)  BP: (!) 181/84 (07/01/17 2020)  SpO2: (!) 83 % (07/01/17 2020) Vital Signs (24h Range):  Temp:  [97.1 °F (36.2 °C)] 97.1 °F (36.2 °C)  Pulse:  [114] 114  Resp:  [16] 16  SpO2:  [83 %] 83 %  BP: (181)/(84) 181/84           There is no height or weight on file to calculate BMI.    No intake or output data in the 24 hours ending 07/01/17 2232    Ortho/SPM Exam   MSK:  RLE:   Skin intact  No shortening  Mild ER at rest  Compartments soft and compressible  Pain with ROM hip  SILT and motor intact SP/DP/T  Brisk cap refill  Warm well perfused extremities  DP palpable    Significant Labs: All pertinent labs within the past 24 hours have been reviewed.    Significant Imaging: I have reviewed all pertinent imaging results/findings.     XR pelvis and femur show R intertrochanteric femur fracture.

## 2017-07-02 NOTE — PLAN OF CARE
Problem: Patient Care Overview  Goal: Plan of Care Review  Patient remains oriented to self, intermittently oriented to place and time. Some complaints of pain to right leg relieved with prn medication. Hypotensive episodes this shift relieved with IVF bolus. Heparin gtt infusing per orders. Plan for possible IVC placement tomorrow. Patient and daughter updated on plan of care. Questions answered, verbalized understanding. Continue to monitor closely.

## 2017-07-02 NOTE — PLAN OF CARE
Problem: Patient Care Overview  Goal: Plan of Care Review  Outcome: Ongoing (interventions implemented as appropriate)  Pt arrived to [facility] unit at 0315. Pt remains afebrile and VSS. BP at first arrival was hypertensive, MD notified. By end of shift BP: 115/58. Pt now sating 99% on nonrebreather (upon arrival was sating 89%). Pt has a barton catheter: UO greater than 100mL/hr every hour.  See flow sheet for full assessment. Pt remains on continuous tele and pulse ox monitor. No falls. No complaints of pain or nausea. Heparin started at 14.1 mg/hr with bolus given. Urinalysis sent off and returned negative. Anti-Xa to be checked at 1000. POC reviewed w/Pt who was unable to verbalized understanding due to confusion.

## 2017-07-02 NOTE — HPI
88 year old man with PMH of AS s/p AVR, Afib, HTN, HLD, presents hypoxic respiratory failure.     Patient found on floor of assisted living facility night of 6/30/17. Fall was not witnessed. Patient does not remember fall. CT head at OSH negative. Transferred to [facility] for evaluation and operative fixation R intertrochanteric fracture.     Patient was desaturating to 76% on 5L nasal cannula, with afib in 90's, and BP systolic>110.He was then placed on nonrebreather and sat 91% at 1209 am.     No chest pain.     Daughter is medical decision maker.

## 2017-07-02 NOTE — ANESTHESIA PREPROCEDURE EVALUATION
07/02/2017  Virgil Melo Jr. is a 88 y.o., male with h/o dementia, seizure d/o, HLD, hypothyroidism, AS s/p AVR, CAD (s/p PCI), HTN, dementia, Afib who presented as a transfer from OSH with R intertrochanteric femur fracture who presents for below procedure. Pt initially scheduled for orthopaedic procedure but overnight transferred to MICU with CTA revealing massive saddle PE and started on heparin gtt. Vascular surgery notes state placement of IVF filter scheduled for tomorrow although case is booked as below. Vascular surgery desires to continue heparin gtt and not to hold for OR.     Per nursing ntoes at 16:30 on 7/2 pt with BP 74/48 and being given 500 cc bolus. Currently in afib w/ HR 81 BPM. Sats 92% on 5 L NC and decreasing urine output.                                                              Pre-operative evaluation for Procedure(s) (LRB):  REMOVAL-FILTER-IVC (N/A)    Previous airway: none on record     Diagnostic Studies:  7/1/17 Pelvis xray  1.  No acute displaced fracture or dislocation of the pelvis, please see separately dictated report for details of the intertrochanteric region of the right femur.    7/1/17 CXR  Patchy increased interstitial and parenchymal attenuation bilaterally, slightly more focal overlying the right lower lung zone, could reflect edema or infection, correlation advised.  Followup recommended.    EKG:  Vent. Rate : 082 BPM     Atrial Rate : 082 BPM     P-R Int : 210 ms          QRS Dur : 096 ms      QT Int : 396 ms       P-R-T Axes : 061 -13 081 degrees     QTc Int : 462 ms    Sinus rhythm with 1st degree A-V block with Blocked Premature atrial  complexes  Incomplete right bundle branch block  Low septal forces  Abnormal ECG  When compared with ECG of 01-JUL-2017 20:58,  Sinus rhythm has replaced Atrial fibrillation  T wave inversion now evident in Anterior  leads  Confirmed by Isaias MARCIAL MD ([id number]) on 7/2/2017 10:42:47 AM    Referred By: KHARI RAMOS           Confirmed By:Isaias MARCIAL MD    2D Echo:  CONCLUSIONS     1 - Low normal to mildly depressed left ventricular systolic function (EF 50-55%).     2 - Concentric remodeling.     3 - Wall motion abnormalities.     4 - Indeterminate LV diastolic function.     5 - Biatrial enlargement.     6 - Right ventricular enlargement with moderately depressed systolic function.     7 - Aortic valve prosthesis, effective prosthetic valve area corrected for BSA is 0.53 cm2.     8 - Mild tricuspid regurgitation.     9 - The estimated PA systolic pressure is 32 mmHg.     This document has been electronically    SIGNED BY: Isaias Marcial MD On: 07/02/2017 10:00    Access:   R AC 18 g PIV   R wrist 18 g PIV   On nasal cannula  [facility]     Patient Active Problem List   Diagnosis    Aortic stenosis    Partial epilepsy with impairment of consciousness    Scoliosis (and kyphoscoliosis), idiopathic    Hypertension    BPH (benign prostatic hypertrophy)    Dementia, vascular    Peripheral vascular disease    S/P AVR (aortic valve replacement)    Hyperlipidemia    Osteoporosis, senile    Posterior vitreous detachment    Pseudophakia of both eyes    Macular degeneration - Both Eyes    Partial epilepsy    Paroxysmal ventricular tachycardia    Hypothyroid    Unstable balance    Seizure disorder    Memory loss    Closed intertrochanteric fracture of right femur    Femur fracture, right    Acute saddle pulmonary embolism    Atrial fibrillation    Unsteady gait    Acute respiratory failure with hypoxia    Tachycardia       Review of patient's allergies indicates:   Allergen Reactions    Penicillins      Other reaction(s): Hives        Current Facility-Administered Medications on File Prior to Visit   Medication Dose Route Frequency Provider Last Rate Last Dose    acetaminophen tablet 650 mg  650 mg Oral Q6H PRN Ramírez Weston DO         albuterol-ipratropium 2.5mg-0.5mg/3mL nebulizer solution 3 mL  3 mL Nebulization Q4H PRN Arup K. Trista, DO        atorvastatin tablet 40 mg  40 mg Oral Daily Arup K. Trista, DO   40 mg at 07/02/17 1011    calcium-vitamin D3 500 mg(1,250mg) -200 unit per tablet 1 tablet  1 tablet Oral BID Arup K. Trista, DO   1 tablet at 07/02/17 1010    gabapentin capsule 600 mg  600 mg Oral TID Arup ROSAURA Trista, DO   600 mg at 07/02/17 1512    heparin 25,000 units in dextrose 5% 250 mL (100 units/mL) bolus from bag; ADDITIONAL PRN BOLUS  30 Units/kg (Adjusted) Intravenous PRN Hanane Simpson MD        heparin 25,000 units in dextrose 5% 250 mL (100 units/mL) bolus from bag; ADDITIONAL PRN BOLUS  15 Units/kg (Adjusted) Intravenous PRN Hanane Simpson MD        heparin 25,000 units in dextrose 5% 250 mL (100 units/mL) infusion  17 Units/kg/hr (Adjusted) Intravenous Continuous Hanane Simpson MD 14.1 mL/hr at 07/02/17 1600 17 Units/kg/hr at 07/02/17 1600    levetiracetam tablet 750 mg  750 mg Oral BID Arup ROSAURA Trista, DO   750 mg at 07/02/17 1010    memantine tablet 10 mg  10 mg Oral Daily Arup . Trista, DO   10 mg at 07/02/17 1011    morphine injection 4 mg  4 mg Intravenous Q4H PRN [facility]   4 mg at 07/01/17 2136    ondansetron injection 4 mg  4 mg Intravenous Q6H PRN [facility]        oxycodone immediate release tablet 5 mg  5 mg Oral Q4H PRN [facility]. Trista, DO   5 mg at 07/02/17 0724    phenobarbital tablet 30 mg  30 mg Oral TID Arup . Trista, DO   30 mg at 07/02/17 1512    senna-docusate 8.6-50 mg per tablet 1 tablet  1 tablet Oral BID [facility]   1 tablet at 07/02/17 1010    senna-docusate 8.6-50 mg per tablet 2 tablet  2 tablet Oral BID PRN Ramírez Weston DO        vitamin D 1000 units tablet 1,000 Units  1,000 Units Oral Daily Ramírez Weston DO   1,000 Units at 07/02/17 1011     Current Outpatient Prescriptions on File Prior to Visit   Medication Sig Dispense Refill     aspirin (ECOTRIN) 325 MG EC tablet Take 1 tablet by mouth Daily.      atorvastatin (LIPITOR) 40 MG tablet TAKE 1 TABLET BY MOUTH EVERY DAY 90 tablet 11    calcium-vitamin D 500-125 mg-unit tablet Take 1 tablet by mouth once daily.        FLUZONE HIGH-DOSE 2015-16, PF, 180 mcg/0.5 mL Syrg   0    gabapentin (NEURONTIN) 800 MG tablet Take 1 tablet (800 mg total) by mouth 3 (three) times daily. 270 tablet 1    levetiracetam (KEPPRA) 750 MG Tab Take 1 tablet (750 mg total) by mouth 2 (two) times daily. 180 tablet 1    levothyroxine (SYNTHROID) 137 MCG Tab tablet TAKE 1 TABLET BY MOUTH EVERY DAY 30 tablet 0    levothyroxine (SYNTHROID) 137 MCG Tab tablet TAKE 1 TABLET BY MOUTH EVERY DAY 90 tablet 0    memantine (NAMENDA) 10 MG Tab 1  tablet 1    metoprolol succinate (TOPROL-XL) 25 MG 24 hr tablet TAKE ONE TABLET BY MOUTH EVERY DAY 90 tablet 0    phenobarbital (LUMINAL) 32.4 MG tablet Take 1 tablet (32.4 mg total) by mouth 3 (three) times daily. 270 tablet 1    vitamin D 185 MG Tab Take 185 mg by mouth once daily.         Past Surgical History:   Procedure Laterality Date    AORTIC VALVE REPLACEMENT  August 2011    CARDIAC CATHETERIZATION  6/11    mild non-obstructive CAD    CARDIAC VALVE SURGERY  8/11    23 mm procine AVR    CATARACT EXTRACTION, BILATERAL      CHOLECYSTECTOMY      HEMORRHOID SURGERY         Social History     Social History    Marital status:      Spouse name: N/A    Number of children: N/A    Years of education: N/A     Occupational History    Not on file.     Social History Main Topics    Smoking status: Never Smoker    Smokeless tobacco: Never Used    Alcohol use No    Drug use: No    Sexual activity: Not on file     Other Topics Concern    Not on file     Social History Narrative    No narrative on file         Vital Signs Range (Last 24H):  Temp:  [36.2 °C (97.1 °F)-36.9 °C (98.4 °F)]   Pulse:  []   Resp:  [16-26]   BP: ()/()   SpO2:  [77  %-99 %]       CBC:   Recent Labs      07/01/17 2045 07/02/17   1120   WBC  13.84*  12.88*   RBC  4.22*  4.23*   HGB  12.8*  12.8*   HCT  39.1*  39.5*   PLT  140*  121*   MCV  93  93   MCH  30.3  30.3   MCHC  32.7  32.4       CMP:   Recent Labs      07/01/17 2046 07/02/17   0728   NA  141  140   K  3.6  4.0   CL  107  107   CO2  22*  19*   BUN  29*  29*   CREATININE  1.2  1.1   GLU  145*  142*   MG  2.1   --    PHOS  3.1   --    CALCIUM  9.0  8.7   ALBUMIN  3.4*  3.2*   PROT  7.2  7.2   ALKPHOS  122  110   ALT  23  25   AST  52*  44*   BILITOT  0.7  0.6       INR  Recent Labs      07/01/17 2045   INR  1.0           Pre-op Assessment    I have reviewed the Patient Summary Reports.      I have reviewed the Medications.     Review of Systems  Anesthesia Hx:  No problems with previous Anesthesia Denies Hx of Anesthetic complications  History of prior surgery of interest to airway management or planning:  Denies Personal Hx of Anesthesia complications.   Social:  Non-Smoker, No Alcohol Use    Hematology/Oncology:     Oncology Normal    -- Anemia:   EENT/Dental:EENT/Dental Normal   Cardiovascular:   Hypertension Valvular problems/Murmurs (s/p AVR), AS Dysrhythmias ECG has been reviewed.    Pulmonary:   Current O2 sats 92% on 5 L NC. Saddle PE    Renal/:   Decreased urine output over past several hours while inpatient    Hepatic/GI:  Hepatic/GI Normal    Musculoskeletal:   R intertrochanteric femur fracture   Neurological:   CVA Seizures  Dementia mild    Endocrine:   Hypothyroidism    Dermatological:  Skin Normal    Psych:   Psychiatric History          Physical Exam  General:  Well nourished    Airway/Jaw/Neck:  Airway Findings: Mouth Opening: Normal Tongue: Normal  General Airway Assessment: Adult  Oropharynx Findings: Normal Mallampati: III  TM Distance: Normal, at least 6 cm  Jaw/Neck Findings:  Neck ROM: Normal ROM  Neck Findings: Normal    Eyes/Ears/Nose:  EYES/EARS/NOSE FINDINGS: Normal   Dental:  Dental  Findings: In tact   Chest/Lungs:  Chest/Lungs Findings: Clear to auscultation, Normal Respiratory Rate     Heart/Vascular:  Heart Findings: Rate: Normal  Heart Murmur        Mental Status:  Mental Status Findings:  Cooperative, Confusion         Anesthesia Plan  Type of Anesthesia, risks & benefits discussed:  Anesthesia Type:  MAC  Patient's Preference:   Intra-op Monitoring Plan: standard ASA monitors and arterial line  Intra-op Monitoring Plan Comments:   Post Op Pain Control Plan:   Post Op Pain Control Plan Comments:   Induction:   IV  Beta Blocker:  Patient is on a Beta-Blocker and has received one dose within the past 24 hours (No further documentation required).       Informed Consent: Patient representative understands risks and agrees with Anesthesia plan.  Questions answered. Anesthesia consent signed with patient representative.  ASA Score: 4     Day of Surgery Review of History & Physical:    H&P update referred to the surgeon.     Anesthesia Plan Notes: Will plan to do arterial line and SIFI catheter in anticipation of IM nailing later today        Ready For Surgery From Anesthesia Perspective.

## 2017-07-02 NOTE — ASSESSMENT & PLAN NOTE
--Admit to medicine hip fracture service for pre-operative clearance and medical evaluation  --To OR tomorrow for operative fixation of R intertroch fx  --Pt marked and consented, case booked  --Pre-operative labs and imaging obtained (UA, Type and Cross, PT-INR, CBC, BMP, PreAlbumin, CXR, EKG)   --Bed rest, barton, NPO at midnight     Risks and complications were discussed including but not limited to the risks of anesthetic complications, infection, wound healing complications, non-union, mal-union, hardware failure, pain, stiffness, DVT, pulmonary embolism, perioperative medical risks (cardiac, pulmonary, renal, neurologic), and death among others were discussed. No guarantees were made and the patient and family elect to proceed. They fully understand the reported 30% mortality risk within the first year of surgery.

## 2017-07-02 NOTE — H&P
[facility] Medicine  History & Physical    Patient Name: Virgil Melo Jr.  MRN: [id number]  Admission Date: 7/1/2017  Attending Physician: Siva Gaspar MD   Primary Care Provider: Luis A Milton MD    [facility] Medicine Team: [facility] MED  Ramírez Weston DO     Patient information was obtained from patient and outside record and daughter .     Subjective:     Principal Problem:Acute saddle pulmonary embolism    Chief Complaint: No chief complaint on file.       HPI:  88 M with a PMH of Epilepsy since childhood ( on keppra, phenobarbital and gabapentin and followed by neurology at [facility] ), dementia,  HTN, Hypothyroidism, aortic stenosis  S/P bioprosthetic AV replacement more than ten years ago presents as a transfer from [facility] for further evaluation and management of right hip fracture. Patient is a poor informant given h/o dementia. Daughter at bedside provided HPI. Patient lives at assisted living facility and ambulates with cane due to unsteady gait. He had an unwitnessed fall inside his room Friday evening (06/30 ) around 6 pm while taking off his pants. Daughter states she thinks patient might have lost balance while undressing and fell to the floor on his buttock. Nursing staff found patient on the floor on his buttock and complaining of right hip pain, notified daughter and sent him to [facility] via EMS. At [facility] imaging including CT pelvis showed intertrochanteric right hip fracture and new onset Afib. Patient's insurance not accepted by [facility] and thus transferred to [facility]. Daughter states since her mother passed away couple of years ago she has been managing his medications and he has been taking only the following meds : keppra, phenobarbital, gabapentin and namenda.     During my interview patient is awake, but confused. Oriented to person only and complaining of right hip pain. Morphine 4 mg IV x 1 given with improved pain. O2 sat in mid 80s on 5 liter N/C. Elevated troponin 0.248,  no chest pain, STAT EKG showed afib with RVR ( HR ~ 110 ). HR improved to 80s after lopressor 5 mg IVP. O2 sat improved to low 90s on NRB mask. Cardiology was consulted for pre op evaluation given elevated troponin, afib W RVR and hypoxia. STAT CTA chest showed extensive pulmonary saddle embolus, involving all lobes with associated right heart strain. CT head negative for bleeding. CCS was consulted and patient was transferred to ICU after CTA showed saddle embolus for close monitoring of hemodynamic/respiratory status.                 Past Medical History:   Diagnosis Date    Aortic stenosis     BPH (benign prostatic hypertrophy)     HEARING LOSS     uses hearing aids    Hypertension     Hypothyroidism     Localization-related (focal) (partial) epilepsy and epileptic syndromes with complex partial seizures, without mention of intractable epilepsy     Memory loss     Other and unspecified hyperlipidemia     Scoliosis (and kyphoscoliosis), idiopathic     Seizures     Stroke        Past Surgical History:   Procedure Laterality Date    AORTIC VALVE REPLACEMENT  August 2011    CARDIAC CATHETERIZATION  6/11    mild non-obstructive CAD    CARDIAC VALVE SURGERY  8/11    23 mm procine AVR    CATARACT EXTRACTION, BILATERAL      CHOLECYSTECTOMY      HEMORRHOID SURGERY         Review of patient's allergies indicates:   Allergen Reactions    Penicillins      Other reaction(s): Hives       No current facility-administered medications on file prior to encounter.      Current Outpatient Prescriptions on File Prior to Encounter   Medication Sig    aspirin (ECOTRIN) 325 MG EC tablet Take 1 tablet by mouth Daily.    atorvastatin (LIPITOR) 40 MG tablet TAKE 1 TABLET BY MOUTH EVERY DAY    calcium-vitamin D 500-125 mg-unit tablet Take 1 tablet by mouth once daily.      FLUZONE HIGH-DOSE 2015-16, PF, 180 mcg/0.5 mL Syrg     gabapentin (NEURONTIN) 800 MG tablet Take 1 tablet (800 mg total) by mouth 3 (three) times  daily.    levetiracetam (KEPPRA) 750 MG Tab Take 1 tablet (750 mg total) by mouth 2 (two) times daily.    levothyroxine (SYNTHROID) 137 MCG Tab tablet TAKE 1 TABLET BY MOUTH EVERY DAY    levothyroxine (SYNTHROID) 137 MCG Tab tablet TAKE 1 TABLET BY MOUTH EVERY DAY    memantine (NAMENDA) 10 MG Tab 1 BID    metoprolol succinate (TOPROL-XL) 25 MG 24 hr tablet TAKE ONE TABLET BY MOUTH EVERY DAY    phenobarbital (LUMINAL) 32.4 MG tablet Take 1 tablet (32.4 mg total) by mouth 3 (three) times daily.    vitamin D 185 MG Tab Take 185 mg by mouth once daily.     Family History     Problem Relation (Age of Onset)    COPD Father, Brother        Social History Main Topics    Smoking status: Never Smoker    Smokeless tobacco: Never Used    Alcohol use No    Drug use: No    Sexual activity: Not on file     Review of Systems   Reason unable to perform ROS: Due to patient factor : Dementia      Objective:     Vital Signs (Most Recent):  Temp: 97.7 °F (36.5 °C) (07/02/17 0315)  Pulse: 83 (07/02/17 0800)  Resp: 20 (07/02/17 0800)  BP: (!) 163/103 (07/02/17 0800)  SpO2: 99 % (07/02/17 0800) Vital Signs (24h Range):  Temp:  [97.1 °F (36.2 °C)-97.7 °F (36.5 °C)] 97.7 °F (36.5 °C)  Pulse:  [] 83  Resp:  [16-26] 20  SpO2:  [77 %-99 %] 99 %  BP: (110-183)/() 163/103     Weight: 91.6 kg (202 lb)  Body mass index is 27.4 kg/m².    Physical Exam   Constitutional: He appears well-developed and well-nourished. No distress.   HENT:   Head: Normocephalic and atraumatic.   Right Ear: External ear normal.   Left Ear: External ear normal.   Nose: Nose normal.   Mouth/Throat: Oropharynx is clear and moist. No oropharyngeal exudate.   Eyes: Conjunctivae and EOM are normal. Pupils are equal, round, and reactive to light. Right eye exhibits no discharge. Left eye exhibits no discharge. No scleral icterus.   Neck: Normal range of motion. Neck supple. No JVD present. No tracheal deviation present. No thyromegaly present.    Cardiovascular: Normal rate, normal heart sounds and intact distal pulses.  Exam reveals no gallop and no friction rub.    No murmur heard.  Pulses:       Dorsalis pedis pulses are 2+ on the right side, and 2+ on the left side.        Posterior tibial pulses are 2+ on the right side, and 2+ on the left side.   Irregular rhythm    Pulmonary/Chest: Effort normal and breath sounds normal. No stridor. No respiratory distress. He has no wheezes. He has no rales. He exhibits no tenderness.   Abdominal: Soft. Bowel sounds are normal. He exhibits no distension and no mass. There is no tenderness. No hernia.   Musculoskeletal: He exhibits tenderness (positive TTP over proximal right anterior thigh, no ecchymosis or hematoma ). He exhibits no edema or deformity.   Limited ROM of RLE due to right hip pain.    Lymphadenopathy:     He has no cervical adenopathy.   Neurological: He is alert. He has normal reflexes. He displays normal reflexes. No cranial nerve deficit. He exhibits normal muscle tone. Coordination normal.   Oriented to self only. Able to follow simple commands. Limited neuro exam due to inability to cooperate.    Skin: Skin is warm and dry. Capillary refill takes 2 to 3 seconds. No rash noted. He is not diaphoretic. No erythema. No pallor.   Minor skin abrasion over left posterior elbow    Psychiatric: He has a normal mood and affect. His behavior is normal.       Significant Labs:   Admission on 07/01/2017   Component Date Value Ref Range Status    WBC 07/01/2017 13.84* 3.90 - 12.70 K/uL Final    RBC 07/01/2017 4.22* 4.60 - 6.20 M/uL Final    Hemoglobin 07/01/2017 12.8* 14.0 - 18.0 g/dL Final    Hematocrit 07/01/2017 39.1* 40.0 - 54.0 % Final    MCV 07/01/2017 93  82 - 98 fL Final    MCH 07/01/2017 30.3  27.0 - 31.0 pg Final    MCHC 07/01/2017 32.7  32.0 - 36.0 % Final    RDW 07/01/2017 14.4  11.5 - 14.5 % Final    Platelets 07/01/2017 140* 150 - 350 K/uL Final    MPV 07/01/2017 10.2  9.2 - 12.9 fL  Final    Gran # 07/01/2017 9.6* 1.8 - 7.7 K/uL Final    Lymph # 07/01/2017 2.9  1.0 - 4.8 K/uL Final    Mono # 07/01/2017 1.3* 0.3 - 1.0 K/uL Final    Eos # 07/01/2017 0.0  0.0 - 0.5 K/uL Final    Baso # 07/01/2017 0.02  0.00 - 0.20 K/uL Final    Gran% 07/01/2017 69.1  38.0 - 73.0 % Final    Lymph% 07/01/2017 21.1  18.0 - 48.0 % Final    Mono% 07/01/2017 9.0  4.0 - 15.0 % Final    Eosinophil% 07/01/2017 0.3  0.0 - 8.0 % Final    Basophil% 07/01/2017 0.1  0.0 - 1.9 % Final    Differential Method 07/01/2017 Automated   Final    Sodium 07/01/2017 141  136 - 145 mmol/L Final    Potassium 07/01/2017 3.6  3.5 - 5.1 mmol/L Final    Chloride 07/01/2017 107  95 - 110 mmol/L Final    CO2 07/01/2017 22* 23 - 29 mmol/L Final    Glucose 07/01/2017 145* 70 - 110 mg/dL Final    BUN, Bld 07/01/2017 29* 8 - 23 mg/dL Final    Creatinine 07/01/2017 1.2  0.5 - 1.4 mg/dL Final    Calcium 07/01/2017 9.0  8.7 - 10.5 mg/dL Final    Total Protein 07/01/2017 7.2  6.0 - 8.4 g/dL Final    Albumin 07/01/2017 3.4* 3.5 - 5.2 g/dL Final    Total Bilirubin 07/01/2017 0.7  0.1 - 1.0 mg/dL Final    Alkaline Phosphatase 07/01/2017 122  55 - 135 U/L Final    AST 07/01/2017 52* 10 - 40 U/L Final    ALT 07/01/2017 23  10 - 44 U/L Final    Anion Gap 07/01/2017 12  8 - 16 mmol/L Final    eGFR if African American 07/01/2017 >60.0  >60 mL/min/1.73 m^2 Final    eGFR if non African American 07/01/2017 53.7* >60 mL/min/1.73 m^2 Final    Prothrombin Time 07/01/2017 10.9  9.0 - 12.5 sec Final    INR 07/01/2017 1.0  0.8 - 1.2 Final    Group & Rh 07/02/2017 O POS   Final    Indirect Sonia 07/02/2017 NEG   Final    Phosphorus 07/01/2017 3.1  2.7 - 4.5 mg/dL Final    Magnesium 07/01/2017 2.1  1.6 - 2.6 mg/dL Final    Transferrin 07/01/2017 208  200 - 375 mg/dL Final    Procalcitonin 07/01/2017 0.18  <0.25 ng/mL Final    Prealbumin 07/01/2017 20  20 - 43 mg/dL Final    Troponin I 07/01/2017 0.248* 0.000 - 0.026 ng/mL Final     BNP 07/01/2017 153* 0 - 99 pg/mL Final    Specimen UA 07/02/2017 Urine, Catheterized   Final    Color, UA 07/02/2017 Yellow  Yellow, Straw, Gabbie Final    Appearance, UA 07/02/2017 Clear  Clear Final    pH, UA 07/02/2017 5.0  5.0 - 8.0 Final    Specific Gravity, UA 07/02/2017 >1.030* 1.005 - 1.030 Final    Protein, UA 07/02/2017 2+* Negative Final    Glucose, UA 07/02/2017 Negative  Negative Final    Ketones, UA 07/02/2017 Negative  Negative Final    Bilirubin (UA) 07/02/2017 Negative  Negative Final    Occult Blood UA 07/02/2017 2+* Negative Final    Nitrite, UA 07/02/2017 Negative  Negative Final    Urobilinogen, UA 07/02/2017 Negative  <2.0 EU/dL Final    Leukocytes, UA 07/02/2017 1+* Negative Final    TSH 07/02/2017 6.375* 0.400 - 4.000 uIU/mL Final    Free T4 07/02/2017 0.82  0.71 - 1.51 ng/dL Final    POC PH 07/02/2017 7.427  7.35 - 7.45 Final    POC PCO2 07/02/2017 32.6* 35 - 45 mmHg Final    POC PO2 07/02/2017 59* 80 - 100 mmHg Final    POC HCO3 07/02/2017 21.5* 24 - 28 mmol/L Final    POC BE 07/02/2017 -3  -2 to 2 mmol/L Final    POC SATURATED O2 07/02/2017 91* 95 - 100 % Final    POC TCO2 07/02/2017 22* 23 - 27 mmol/L Final    Sample 07/02/2017 ARTERIAL   Final    Site 07/02/2017 RR   Final    Allens Test 07/02/2017 Pass   Final    DelSys 07/02/2017 NRB   Final    POC Lactate 07/02/2017 1.08  0.36 - 1.25 mmol/L Final    Sample 07/02/2017 ARTERIAL   Final    Site 07/02/2017 LR   Final    Allens Test 07/02/2017 Pass   Final    DelSys 07/02/2017 NRB   Final    RBC, UA 07/02/2017 2  0 - 4 /hpf Final    WBC, UA 07/02/2017 1  0 - 5 /hpf Final    Bacteria, UA 07/02/2017 Rare  None-Occ /hpf Final    Hyaline Casts, UA 07/02/2017 0  0-1/lpf /lpf Final    Microscopic Comment 07/02/2017 SEE COMMENT   Final               Significant Imaging: I have reviewed and interpreted all pertinent imaging results/findings within the past 24 hours.    Assessment/Plan:     Active Diagnoses:     Diagnosis Date Noted POA    PRINCIPAL PROBLEM:  Acute saddle pulmonary embolism [I26.92] 07/02/2017 Yes    Femur fracture, right [S72.91XA] 07/02/2017 Yes    Atrial fibrillation [I48.91] 07/02/2017 Yes    Unsteady gait [R26.81] 07/02/2017 Yes    Closed intertrochanteric fracture of right femur [S72.141A] 07/01/2017 Yes    Hypothyroid [E03.9] 02/20/2013 Yes    Dementia, vascular [F01.50] 10/09/2012 Yes    Hypertension [I10]  Yes    Partial epilepsy with impairment of consciousness [G40.209]  Yes      Problems Resolved During this Admission:    Diagnosis Date Noted Date Resolved POA     # Acute hypoxic respiratory failure   - CTA revealed extensive saddle embolus   - Elevated troponin and right heart strain 2/2 PE   - O2 sat improved on NRB supplemental oxygen   - Patient was transferred to ICU for close monitoring of hemodynamic and respiratory status   - tPA if becomes hemodynamically unstable   - Consider interventional cardiology consult to evaluate  for pulmonary artery catheter directed thrombolysis ( CDT ) for treatment of PE given elevated troponin and R heart strain   - 2D ECHO w CFD in am    - metoprolol for rate control of afib   - Cardiology and CCS consult appreciated     # Right hip fracture   - S/P mechanical fall on evening of 06/30 with inter-trochanteric R hip fracture   - Orthopedic surgery consult noted and appreciated   - Patient is unstable to undergo hip surgery at this point given saddle PE associated with elevated troponin, R heart strain and hypoxic respiratory failure     # Epilepsy   - well controlled on phenobarbital, keppra and gabapentin     # Code Status : Full code per daughter at bedside     VTE Risk Mitigation         Ordered     Medium Risk of VTE  Once      07/01/17 2031     Place TENISHA hose  Until discontinued      07/01/17 2031     Place sequential compression device  Until discontinued      07/01/17 2031        Ramírez Weston DO  Department of Hospital Medicine   [facility]

## 2017-07-02 NOTE — SUBJECTIVE & OBJECTIVE
Past Medical History:   Diagnosis Date    Aortic stenosis     BPH (benign prostatic hypertrophy)     HEARING LOSS     uses hearing aids    Hypertension     Hypothyroidism     Localization-related (focal) (partial) epilepsy and epileptic syndromes with complex partial seizures, without mention of intractable epilepsy     Memory loss     Other and unspecified hyperlipidemia     Scoliosis (and kyphoscoliosis), idiopathic     Seizures     Stroke        Past Surgical History:   Procedure Laterality Date    AORTIC VALVE REPLACEMENT  August 2011    CARDIAC CATHETERIZATION  6/11    mild non-obstructive CAD    CARDIAC VALVE SURGERY  8/11    23 mm procine AVR    CATARACT EXTRACTION, BILATERAL      CHOLECYSTECTOMY      HEMORRHOID SURGERY         Review of patient's allergies indicates:   Allergen Reactions    Penicillins      Other reaction(s): Hives       Family History     Problem Relation (Age of Onset)    COPD Father, Brother        Social History Main Topics    Smoking status: Never Smoker    Smokeless tobacco: Never Used    Alcohol use No    Drug use: No    Sexual activity: Not on file      Review of Systems   Respiratory: Positive for shortness of breath. Negative for cough, choking and chest tightness.    Cardiovascular: Negative for chest pain, palpitations and leg swelling.   Gastrointestinal: Negative for abdominal distention, abdominal pain, nausea and vomiting.   Genitourinary: Negative for difficulty urinating, dysuria, flank pain and hematuria.     Objective:     Vital Signs (Most Recent):  Temp: 97.5 °F (36.4 °C) (07/01/17 2312)  Pulse: 84 (07/02/17 0019)  Resp: (!) 26 (07/02/17 0019)  BP: (!) 140/81 (07/02/17 0019)  SpO2: (!) 91 % (07/02/17 0019) Vital Signs (24h Range):  Temp:  [97.1 °F (36.2 °C)-97.5 °F (36.4 °C)] 97.5 °F (36.4 °C)  Pulse:  [] 84  Resp:  [16-26] 26  SpO2:  [77 %-91 %] 91 %  BP: (132-181)/(77-84) 140/81      There is no height or weight on file to calculate  BMI.    No intake or output data in the 24 hours ending 07/02/17 0207    Physical Exam   Constitutional: He appears distressed.   Eyes: EOM are normal. Pupils are equal, round, and reactive to light.   Cardiovascular: Regular rhythm and normal heart sounds.    Pulmonary/Chest: Breath sounds normal. He is in respiratory distress. He has no wheezes. He has no rales. He exhibits no tenderness.   Neurological:   Mild confusion- thinks he is in nursing home   Skin: Skin is warm and dry. No rash noted. He is not diaphoretic. No erythema.       Vents:     Lines/Drains/Airways          No matching active lines, drains, or airways        Significant Labs:    CBC/Anemia Profile:    Recent Labs  Lab 07/01/17 2045   WBC 13.84*   HGB 12.8*   HCT 39.1*   *   MCV 93   RDW 14.4        Chemistries:    Recent Labs  Lab 07/01/17 2046      K 3.6      CO2 22*   BUN 29*   CREATININE 1.2   CALCIUM 9.0   ALBUMIN 3.4*   PROT 7.2   BILITOT 0.7   ALKPHOS 122   ALT 23   AST 52*   MG 2.1   PHOS 3.1       ABGs:   Recent Labs  Lab 07/02/17  0015   PH 7.427   PCO2 32.6*   HCO3 21.5*   POCSATURATED 91*   BE -3     BMP:   Recent Labs  Lab 07/01/17 2046   *      K 3.6      CO2 22*   BUN 29*   CREATININE 1.2   CALCIUM 9.0   MG 2.1     POCT Glucose: No results for input(s): POCTGLUCOSE in the last 48 hours.  Troponin:   Recent Labs  Lab 07/01/17 2046   TROPONINI 0.248*     All pertinent labs within the past 24 hours have been reviewed.    Significant Imaging: CT: I have reviewed all pertinent results/findings within the past 24 hours and my personal findings are:  Extensive pulmonary emboli in Right and Upper Lobes with associated Right Heart Strain

## 2017-07-02 NOTE — HPI
88 YOM with h/o seizure d/o, HLD, hypothyroidism, AS s/p AVR, HTN, dementia, Afib presents as transfer from OSH with R intertrochanteric femur fracture.    Patient was found on floor of assisting living facility.  Fall not witnessed.  Patient does not remember fall.  CT head at OSH negative.  Patient transferred to [facility] for evaluation because his insurance did not cover his surgery at OSH.  Patient walks with cane at baseline.  Daughter makes medical decisions for him.    Patient denies HA, CP, SOB, pain to other extremity.  Reports abdominal pain and R hip pain.

## 2017-07-02 NOTE — CONSULTS
[facility]  Orthopedics  Consult Note    Patient Name: Virgil Melo Jr.  MRN: [id number]  Admission Date: 7/1/2017  Hospital Length of Stay: 0 days  Attending Provider: Chiqui Padilla MD  Primary Care Provider: Luis A Milton MD    Patient information was obtained from patient, relative(s), past medical records and ER records.     Inpatient consult to Orthopedic Surgery  Consult performed by: AUTUMN OGDEN  Consult ordered by: CAROLEE VARGAS        Subjective:     Principal Problem:<principal problem not specified>    Chief Complaint: No chief complaint on file.       HPI: 88 YOM with h/o seizure d/o, HLD, hypothyroidism, AS s/p AVR, HTN, dementia, Afib presents as transfer from OSH with R intertrochanteric femur fracture.    Patient was found on floor of assisting living facility night of 6/30/17.  Fall not witnessed.  Patient does not remember fall.  CT head at OSH negative.  Patient transferred to [facility] for evaluation because his insurance did not cover his surgery at OSH.  Patient walks with cane at baseline.  Daughter makes medical decisions for him.    Patient denies HA, CP, SOB, pain to other extremity.  Reports abdominal pain and R hip pain.    Past Medical History:   Diagnosis Date    Aortic stenosis     BPH (benign prostatic hypertrophy)     HEARING LOSS     uses hearing aids    Hypertension     Hypothyroidism     Localization-related (focal) (partial) epilepsy and epileptic syndromes with complex partial seizures, without mention of intractable epilepsy     Memory loss     Other and unspecified hyperlipidemia     Scoliosis (and kyphoscoliosis), idiopathic     Seizures     Stroke        Past Surgical History:   Procedure Laterality Date    AORTIC VALVE REPLACEMENT  August 2011    CARDIAC CATHETERIZATION  6/11    mild non-obstructive CAD    CARDIAC VALVE SURGERY  8/11    23 mm procine AVR    CATARACT EXTRACTION, BILATERAL      CHOLECYSTECTOMY      HEMORRHOID  SURGERY         Review of patient's allergies indicates:   Allergen Reactions    Penicillins      Other reaction(s): Hives       Current Facility-Administered Medications   Medication    acetaminophen tablet 650 mg    albuterol-ipratropium 2.5mg-0.5mg/3mL nebulizer solution 3 mL    [START ON 7/2/2017] atorvastatin tablet 40 mg    calcium-vitamin D3 500 mg(1,250mg) -200 unit per tablet 1 tablet    gabapentin capsule 600 mg    levetiracetam tablet 750 mg    [START ON 7/2/2017] levothyroxine tablet 137 mcg    [START ON 7/2/2017] memantine tablet 10 mg    metoprolol injection 5 mg    [START ON 7/2/2017] metoprolol succinate (TOPROL-XL) 24 hr tablet 25 mg    morphine injection 4 mg    ondansetron injection 4 mg    oxycodone immediate release tablet 5 mg    phenobarbital tablet 30 mg    senna-docusate 8.6-50 mg per tablet 1 tablet    senna-docusate 8.6-50 mg per tablet 2 tablet    sodium chloride 0.9% flush 3 mL    [START ON 7/2/2017] vitamin D 1000 units tablet 1,000 Units     Family History     Problem Relation (Age of Onset)    COPD Father, Brother        Social History Main Topics    Smoking status: Never Smoker    Smokeless tobacco: Never Used    Alcohol use No    Drug use: No    Sexual activity: Not on file     ROS   Constitutional: negative for fevers  Eyes: no visual changes  ENT: negative for hearing loss  Respiratory: negative for dyspnea  Cardiovascular: negative for chest pain  Gastrointestinal: posative for abdominal pain  Genitourinary: negative for dysuria  Neurological: negative for headaches  Behavioral/Psych: negative for hallucinations  Endocrine: negative for temperature intolerance    Objective:     Vital Signs (Most Recent):  Temp: 97.1 °F (36.2 °C) (07/01/17 2020)  Pulse: (!) 114 (07/01/17 2020)  Resp: 16 (07/01/17 2020)  BP: (!) 181/84 (07/01/17 2020)  SpO2: (!) 83 % (07/01/17 2020) Vital Signs (24h Range):  Temp:  [97.1 °F (36.2 °C)] 97.1 °F (36.2 °C)  Pulse:  [114]  114  Resp:  [16] 16  SpO2:  [83 %] 83 %  BP: (181)/(84) 181/84           There is no height or weight on file to calculate BMI.    No intake or output data in the 24 hours ending 07/01/17 2232    Ortho/SPM Exam   MSK:  RLE:   Skin intact  No shortening  Mild ER at rest  Compartments soft and compressible  Pain with ROM hip  SILT and motor intact SP/DP/T  Brisk cap refill  Warm well perfused extremities  DP palpable    Significant Labs: All pertinent labs within the past 24 hours have been reviewed.    Significant Imaging: I have reviewed all pertinent imaging results/findings.     XR pelvis and femur show R intertrochanteric femur fracture.    Assessment/Plan:     Closed intertrochanteric fracture of right femur    --Admit to medicine hip fracture service for pre-operative clearance and medical evaluation  --To OR tomorrow for operative fixation of R intertroch fx if cleared by medicine and cardiology  --Pt marked and consented, case booked  --Pre-operative labs and imaging obtained (UA, Type and Cross, PT-INR, CBC, BMP, PreAlbumin, CXR, EKG)   --Bed rest, barton, NPO at midnight     Risks and complications were discussed including but not limited to the risks of anesthetic complications, infection, wound healing complications, non-union, mal-union, hardware failure, pain, stiffness, DVT, pulmonary embolism, perioperative medical risks (cardiac, pulmonary, renal, neurologic), and death among others were discussed. No guarantees were made and the patient and family elect to proceed. They fully understand the reported 30% mortality risk within the first year of surgery.             Thank you for your consult. I will follow-up with patient. Please contact us if you have any additional questions.    Doug Crabtree MD  Orthopedics  [facility]

## 2017-07-02 NOTE — ASSESSMENT & PLAN NOTE
- Large saddle pulmonary embolus with right heart strain found on CTA causing hypoxic respiratory failure. Likely provoked from recent R. Hip fracture.   - Heparin drip   - No acute indication for TPA given hemodynamic stability

## 2017-07-02 NOTE — CONSULTS
[facility]  Vascular Surgery  Consult Note    Inpatient consult to Vascular Surgery  Consult performed by: SERGIO SANDHU  Consult ordered by: TORIN SIDDIQUI        Subjective:     Chief Complaint/Reason for Admission: PE; R intertrochanteric femur fracture    History of Present Illness: 89 yo M with h/o atrial fibrillation (not on coumadin), CAD s/p PCI, AS s/p porcine AVR 2011, seizures, dementia presenting from assisted living facility after unwitnessed fall and subsequent R intertrochanteric fracture.  Patient upon admission to hospital was found to be tachycardic and tachypnic with hypoxemia; CTA chest performed demonstrating saddle pulmonary embolus involving all lobes.  Was admitted to medical ICU; is on heparin gtt. Vascular surgery consulted for IVC filter secondary to need for IMN.  Patient somnolent at bedside, poor historian.      Prescriptions Prior to Admission   Medication Sig Dispense Refill Last Dose    aspirin (ECOTRIN) 325 MG EC tablet Take 1 tablet by mouth Daily.   Taking    atorvastatin (LIPITOR) 40 MG tablet TAKE 1 TABLET BY MOUTH EVERY DAY 90 tablet 11 Taking    calcium-vitamin D 500-125 mg-unit tablet Take 1 tablet by mouth once daily.     Taking    FLUZONE HIGH-DOSE 2015-16, PF, 180 mcg/0.5 mL Syrg   0 Taking    gabapentin (NEURONTIN) 800 MG tablet Take 1 tablet (800 mg total) by mouth 3 (three) times daily. 270 tablet 1     levetiracetam (KEPPRA) 750 MG Tab Take 1 tablet (750 mg total) by mouth 2 (two) times daily. 180 tablet 1     levothyroxine (SYNTHROID) 137 MCG Tab tablet TAKE 1 TABLET BY MOUTH EVERY DAY 30 tablet 0 Taking    levothyroxine (SYNTHROID) 137 MCG Tab tablet TAKE 1 TABLET BY MOUTH EVERY DAY 90 tablet 0 Taking    memantine (NAMENDA) 10 MG Tab 1  tablet 1     metoprolol succinate (TOPROL-XL) 25 MG 24 hr tablet TAKE ONE TABLET BY MOUTH EVERY DAY 90 tablet 0 Taking    phenobarbital (LUMINAL) 32.4 MG tablet Take 1 tablet (32.4 mg  total) by mouth 3 (three) times daily. 270 tablet 1     vitamin D 185 MG Tab Take 185 mg by mouth once daily.   Taking       Review of patient's allergies indicates:   Allergen Reactions    Penicillins      Other reaction(s): Hives       Past Medical History:   Diagnosis Date    Aortic stenosis     BPH (benign prostatic hypertrophy)     HEARING LOSS     uses hearing aids    Hypertension     Hypothyroidism     Localization-related (focal) (partial) epilepsy and epileptic syndromes with complex partial seizures, without mention of intractable epilepsy     Memory loss     Other and unspecified hyperlipidemia     Scoliosis (and kyphoscoliosis), idiopathic     Seizures     Stroke      Past Surgical History:   Procedure Laterality Date    AORTIC VALVE REPLACEMENT  August 2011    CARDIAC CATHETERIZATION  6/11    mild non-obstructive CAD    CARDIAC VALVE SURGERY  8/11    23 mm procine AVR    CATARACT EXTRACTION, BILATERAL      CHOLECYSTECTOMY      HEMORRHOID SURGERY       Family History     Problem Relation (Age of Onset)    COPD Father, Brother        Social History Main Topics    Smoking status: Never Smoker    Smokeless tobacco: Never Used    Alcohol use No    Drug use: No    Sexual activity: Not on file     Review of Systems   Unable to perform ROS: Age     Objective:     Vital Signs (Most Recent):  Temp: 98.4 °F (36.9 °C) (07/02/17 1100)  Pulse: 92 (07/02/17 1100)  Resp: 18 (07/02/17 1100)  BP: 110/62 (07/02/17 1100)  SpO2: (!) 94 % (07/02/17 1100) Vital Signs (24h Range):  Temp:  [97.1 °F (36.2 °C)-98.4 °F (36.9 °C)] 98.4 °F (36.9 °C)  Pulse:  [] 92  Resp:  [16-26] 18  SpO2:  [77 %-99 %] 94 %  BP: (110-183)/() 110/62     Weight: 91.6 kg (202 lb)  Body mass index is 27.4 kg/m².    Physical Exam   Constitutional: He appears well-developed and well-nourished. He appears lethargic.   HENT:   Head: Normocephalic and atraumatic.   Eyes: EOM are normal. Pupils are equal, round, and  reactive to light.   Neck: No tracheal deviation present.   Cardiovascular: An irregularly irregular rhythm present.   Pulses:       Femoral pulses are 2+ on the right side, and 2+ on the left side.  Pulmonary/Chest: Effort normal. No respiratory distress. He has no wheezes.   Abdominal: Soft. He exhibits distension. There is no tenderness.   Musculoskeletal: He exhibits no edema.   Tenderness to palpation of right femur   Neurological: He appears lethargic.   Skin: Skin is warm and dry.       Significant Labs:  CBC:   Recent Labs  Lab 07/01/17 2045   WBC 13.84*   RBC 4.22*   HGB 12.8*   HCT 39.1*   *   MCV 93   MCH 30.3   MCHC 32.7     CMP:   Recent Labs  Lab 07/02/17 0728   *   CALCIUM 8.7   ALBUMIN 3.2*   PROT 7.2      K 4.0   CO2 19*      BUN 29*   CREATININE 1.1   ALKPHOS 110   ALT 25   AST 44*   BILITOT 0.6     Coagulation:   Recent Labs  Lab 07/01/17 2045   LABPROT 10.9   INR 1.0       Significant Diagnostics:  CTA Chest Non Coronary [[id number]] Resulted: 07/02/17 0217   Order Status: Completed Updated: 07/02/17 0218   Narrative:     CTA chest noncoronary    Clinical history: New onset A. fib    Technique:  Axial images of the thorax were obtained at 3 mm intervals during administration of 75 cc Omni 350 IV contrast following CTA chest noncoronary portal.    Findings:      The structures of the base of the neck are grossly unremarkable.  No significant mediastinal lymphadenopathy.  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  The heart is mildly prominent.  There is atherosclerotic calcification in the distribution of the coronary arteries.  No significant pericardial effusion.  Limited evaluation of the spleen and liver are grossly unremarkable.  There is reflux of contrast within the hepatic veins, suggesting elevated right heart pressures.  There are bilateral adrenal nodules, incompletely characterized noting attenuation of the right  nodule is suggestive for adenoma, the left is nonspecific.  There is a hiatal hernia, small.    The airways are grossly patent noting limited evaluation secondary to respiratory motion.    There is bilateral basilar dependent atelectasis.  There is no pneumothorax.  No significant volume of pleural fluid.  There is mild left pleural thickening.    Bolus timing is adequate for the evaluation of pulmonary thromboembolism.  There is extensive pulmonary thromboembolism, noting saddle embolus.  Embolus extends from the main pulmonary artery trunk, into the bilateral left and right pulmonary arteries, and extends distally to several lobar, segmental, and subsegmental branches involving all lobes.  There is expansion of the right ventricle, minimal internal volume of the left ventricle suggesting elevated right heart pressure and right heart strain.    Degenerative changes are noted of the thoracic spine without focal osseous destructive process.  Sternotomy noted.    No significant axillary lymphadenopathy.   Impression:       1.  Extensive pulmonary saddle embolus, involving all lobes, described in detail above with associated right heart strain.    2.  Several additional findings above.    Findings were discussed with Toro levi at 02:06:16 07/02/17         Assessment/Plan:     * Acute saddle pulmonary embolism    9 yo M with h/o atrial fibrillation (not on coumadin), CAD s/p PCI, AS s/p porcine AVR 2011 presenting with saddle pulmonary embolus and R intertrochanteric fracture.    Patient currently saturating 95 % on 5 LNC  On heparin gtt  Plan for IVC filter placement tomorrow in OR; continue heparin gtt do not hold for OR            Thank you for your consult.    Elsa Parr MD  Vascular Surgery  [facility]

## 2017-07-02 NOTE — PROGRESS NOTES
[facility]  Orthopedics  Progress Note    Patient Name: Virgil Melo Jr.  MRN: [id number]  Admission Date: 7/1/2017  Hospital Length of Stay: 1 days  Attending Provider: Siva Gaspar MD  Primary Care Provider: Luis A Milton MD  Follow-up For: Procedure(s) (LRB):  IM NAILING OF FEMUR - RIGHT (Right)    Post-Operative Day:    Subjective:     Principal Problem:Acute saddle pulmonary embolism    Principal Orthopedic Problem: R intertroch fx    Interval History: Patient seen and examined at bedside.  Pain controlled.  Pt hypoxic and found to have saddle embolus.  Currently in ICU on NRB with adequate sats, heparin gtt.      Review of patient's allergies indicates:   Allergen Reactions    Penicillins      Other reaction(s): Hives       Current Facility-Administered Medications   Medication    acetaminophen tablet 650 mg    albuterol-ipratropium 2.5mg-0.5mg/3mL nebulizer solution 3 mL    atorvastatin tablet 40 mg    calcium-vitamin D3 500 mg(1,250mg) -200 unit per tablet 1 tablet    gabapentin capsule 600 mg    heparin 25,000 units in dextrose 5% 250 mL (100 units/mL) bolus from bag; ADDITIONAL PRN BOLUS    heparin 25,000 units in dextrose 5% 250 mL (100 units/mL) bolus from bag; ADDITIONAL PRN BOLUS    heparin 25,000 units in dextrose 5% 250 mL (100 units/mL) infusion    levetiracetam tablet 750 mg    memantine tablet 10 mg    metoprolol tartrate (LOPRESSOR) tablet 25 mg    morphine injection 4 mg    ondansetron injection 4 mg    oxycodone immediate release tablet 5 mg    phenobarbital tablet 30 mg    senna-docusate 8.6-50 mg per tablet 1 tablet    senna-docusate 8.6-50 mg per tablet 2 tablet    sodium chloride 0.9% flush 3 mL    vitamin D 1000 units tablet 1,000 Units     Objective:     Vital Signs (Most Recent):  Temp: 97.7 °F (36.5 °C) (07/02/17 0315)  Pulse: 85 (07/02/17 0701)  Resp: 19 (07/02/17 0701)  BP: 110/67 (07/02/17 0701)  SpO2: 98 % (07/02/17 0701) Vital Signs  (24h Range):  Temp:  [97.1 °F (36.2 °C)-97.7 °F (36.5 °C)] 97.7 °F (36.5 °C)  Pulse:  [] 85  Resp:  [16-26] 19  SpO2:  [77 %-98 %] 98 %  BP: (110-183)/() 110/67     Weight: 91.6 kg (202 lb)  Height: 6' (182.9 cm)  Body mass index is 27.4 kg/m².      Intake/Output Summary (Last 24 hours) at 07/02/17 0726  Last data filed at 07/02/17 0600   Gross per 24 hour   Intake             85.5 ml   Output              340 ml   Net           -254.5 ml       Ortho/SPM Exam   AAOx4  NAD  RRR  No increased WOB  SILT and motor intact T/SP/DP  WWP extremities    Significant Labs: All pertinent labs within the past 24 hours have been reviewed.    Significant Imaging: I have reviewed and interpreted all pertinent imaging results/findings.    Assessment/Plan:     Closed intertrochanteric fracture of right femur    Will discuss further management with staff in setting of saddle PE.              Doug Crabtree MD  Orthopedics  [facility]

## 2017-07-03 NOTE — ADDENDUM NOTE
Addendum  created 07/03/17 1322 by Thomas Turner MD    Anesthesia Intra Blocks edited, Child order released for a procedure order, LDA created via procedure documentation, Sign clinical note

## 2017-07-03 NOTE — PLAN OF CARE
Problem: Patient Care Overview  Goal: Plan of Care Review  Pt remains afebrile and VSS. Pt. Remains confused and only oriented to self. See flow sheet for full assessment. Pt remains on continuous tele and pulse ox monitor. No falls. No complaints of pain or nausea. Pt remains on heparin gtt, Anti-Xa therapeutic, needs to be checked again with morning labs. UO ranged from 10-40 mL/hr. Pt. Received 500 cc NS bolus for MAP less than 65. Pt. Changed from low flow to high flow nasal cannula to maintain Sats greater than 92%. 40 mEq K given for level of 3.5. ST consult put in, pt can swallow pills with pudding but must not take liquids per MD verbal order. POC reviewed w/Pt who was unable to verbalize understanding.

## 2017-07-03 NOTE — ASSESSMENT & PLAN NOTE
7 yo M with h/o atrial fibrillation (not on coumadin), CAD s/p PCI, AS s/p porcine AVR 2011 presenting with saddle pulmonary embolus and R intertrochanteric fracture.    Patient currently on 10LNC, wean 02 as tolerated  On heparin gtt  Has ECHO and US venous duplex reviewed - acute R popliteal thrombosis and ECHO showing moderately depressed right ventricle function   To OR today for IVC filter placement

## 2017-07-03 NOTE — ASSESSMENT & PLAN NOTE
-Plans for surgery but likely wont proceed as patient not stable, and requiring IVC filter.  -Leave NPO for now.   -Have notified Ortho he is on Heparin drip.

## 2017-07-03 NOTE — OP NOTE
OP NOTE    DOS:  7/3/2017    Preop Dx: Right intertrochanteric femur fracture    Postop Dx: Right intertrochanteric femur fracture    Procedure: Cephalomedullary nail fixation of right intertrochanteric femur fracture - 68696    Surgeon: Branden Gonzales M.D.    Asst:  Michael Casale, M.D    Anesthesia: GETA    EBL:  100cc    IVF:  1000cc crystalloid    Implants: Synthes TFNa 420x11 with 90mm hip blade    Specimens: None    Findings: Stable pattern.  WBAT    Dispo:  To MICU intubated/stable.    Indications for Procedure:      Patient is an 89 yo male who was transferred from [facility] with a right intertrochanteric femur fracture.  He was diagnosed shortly after arrival with a saddle pulmonary embolus.  He has popliteal DVT in the right leg.  He was started on heparin therapeutic dose.  He received an IVC filter this morning and then came off of the heparin for 3 hours.  We are now proceeding after discussion with family for CMN fixation of the fracture.  Risks, benefits and alternatives discussed.  Informed consent was obtained.    Procedure in Detail:    Patient was brought directly from the ICU to the OR.  GETA was induced and preoperative antibiotics were administered.  The patient was placed on the ProFX fracture table with all bony prominences well padded and both lower extremities in traction boots.  The right hip and lower extremity were prepped sterile.  A timeout was undertaken to confirm patient, side, site, surgery and administration of antibiotics.  All agreed and we proceeded.     Incision was made proximal to the greater trochanter.  The guidewire for the entry reamer was placed and the femur drilled.  The reaming yenifer was placed and measure.  A 420mm nail was appropriate.  This was placed under fluoroscopy ensuring its position.  A separate lateral incision was made and the guidewire for the hip blade placed.  This was measure and reamed.  A 90mm hip blade was placed and the fracture compressed through the  insertion handle.  The cephalic screw was tightened and turned back slightly to allow for controlled collapse.  This was a stable pattern and did not require distal interlocking.      The wounds were copiously irrigated with normal saline and closed with 0 vicryl, 2-0 vicryl and then 3-0 nylon in the skin.  Sterile dressings were applied.    All instrument and sponge counts were reported correct at the end of the case.  We elected to leave the patient intubated at that time and bring him back to the ICU, stable, for slower, controlled extubation.     Plan for the patient.  Immediately start therapeutic heparin again.  WBAT with PT/OT when able.      Branden Gonzales MD

## 2017-07-03 NOTE — OP NOTE
Date: 07/03/2017    Surgeon: Dino Bowens MD FACS    Pre-op Diagnosis: Need for IVC filter placement; h/o saddle PE with acute R hip fx in need of repair    Post-op Diagnosis:  Same    Procedure:  U/S R Fem v. Access  Insertion [place] IVC filter under ultrasound- and fluoroscopic-guidance    Anesthesia: RN IV Sedation    Findings/Key Components:  Normal IVC anatomy free of thrombus. Suitable for filter placement    EBL: 5mL    Contrast: 30 mL         Indication:Virgil Melo Jr. is a 88 y.o. male with a need for IVC filter placement.  The patient has a h/o h/o saddle PE with acute R hip fx in need of repair.  An IVC filter is being placed to prevent additional PE given that he is at high risk of additional DVT/PE.    Procedure: After ontaining consent, he was brought to the endoascular suite. Time-out was performed. The groins were prepped and draped in standard fashion. Using duplex ultra-sound the R femoral vein was cannulated. The Bard sheath was placed and an IVC venogram performed showing normal anatomy and no thrombus. The lower renal vein was identified and the filter deployed below it. It appeared to be positioned well with no tilt. Wires and sheaths were removed and pressure held for hemostasis.

## 2017-07-03 NOTE — ANESTHESIA POSTPROCEDURE EVALUATION
Anesthesia Post Evaluation    Patient: Virgil Melo Jr.    Procedure(s) Performed: Procedure(s) (LRB):  INSERTION-FILTER-INFERIOR VENA CAVA (N/A)    Final Anesthesia Type: MAC  Patient location during evaluation: ICU  Patient participation: Yes- Able to Participate  Level of consciousness: awake and alert  Post-procedure vital signs: reviewed and stable  Pain management: adequate  Airway patency: patent  PONV status at discharge: No PONV  Anesthetic complications: no      Cardiovascular status: blood pressure returned to baseline  Respiratory status: face mask  Hydration status: euvolemic  Follow-up not needed.        Visit Vitals  BP (!) 141/69 (BP Location: Left arm, Patient Position: Lying, BP Method: Automatic)   Pulse 68   Temp 36.6 °C (97.8 °F) (Oral)   Resp 20   Ht 6' (1.829 m)   Wt 91.6 kg (202 lb)   SpO2 98%   BMI 27.40 kg/m²       Pain/Ibrahima Score: Pain Assessment Performed: Yes (7/3/2017  9:00 AM)  Presence of Pain: complains of pain/discomfort (7/3/2017  9:00 AM)  Pain Rating Prior to Med Admin: 6 (7/2/2017  7:24 AM)  Pain Rating Post Med Admin: 3 (7/2/2017  8:24 AM)

## 2017-07-03 NOTE — PROGRESS NOTES
Patient went for IVC filter several hours ago.  I had a long talk with the family and with the MICU team.  There is no low risk answer for the patient.  Leaving him laying in bed with an intertrochanteric fracture is not an option and really improving his risk profile more than the filter would take weeks.  I have taken him off of the heparin for the last 3 hours and will proceed with nail fixation of his fracture.  We will resume therapeutic dose heparin immediately after surgery.  The family understands the risks with this patient and wish to proceed.    Branden Gonzales MD

## 2017-07-03 NOTE — ANESTHESIA PROCEDURE NOTES
Suprainguinal Fascia Iliaca     Patient location during procedure: pre-op   Block not for primary anesthetic.  Reason for block: at surgeon's request and post-op pain management   Post-op Pain Location: Right hip pain  Start time: 7/3/2017 11:23 AM  Timeout: 7/3/2017 11:22 AM   End time: 7/3/2017 11:40 AM  Staffing  Anesthesiologist: BETTY SAUER  Resident/CRNA: KORI LOVE  Performed: resident/CRNA   Preanesthetic Checklist  Completed: patient identified, site marked, surgical consent, pre-op evaluation, timeout performed, IV checked, risks and benefits discussed and monitors and equipment checked  Peripheral Block  Patient position: supine  Prep: ChloraPrep  Patient monitoring: cardiac monitor, heart rate, continuous pulse ox, continuous capnometry and frequent blood pressure checks  Block type: fascia iliaca (Supra Inguinal )  Laterality: right  Injection technique: single shot  Needle  Needle type: Stimuplex   Needle gauge: 22 G  Needle length: 4 in  Needle localization: ultrasound guidance and anatomical landmarks  Catheter type: spring wound  Catheter size: 20 G  Catheter at skin depth: 15 cm  Test dose: negative and lidocaine 1.5% with Epi 1-to-200,000   -ultrasound image captured on disc.  Assessment  Injection assessment: local visualized surrounding nerve, negative parasthesia and negative aspiration  Paresthesia pain: none  Heart rate change: no  Slow fractionated injection: yes  Medications:  Bolus administered: 40 mL of 0.25 ropivacaine  Epinephrine added: 3.75 mcg/mL (1/300,000)  Additional Notes  VSS, See [facility] nurse for vitals. Patient tolerated the block well.

## 2017-07-03 NOTE — PT/OT/SLP PROGRESS
Speech Language Pathology      Virgil Melo Jr.  MRN: [id number]    Patient not seen today secondary to surgery. ST to continue to monitor. Thank you.    GERARD Carbajal., CCC-SLP  Speech-Language Pathology  Pager: [phone]  7/3/2017

## 2017-07-03 NOTE — PLAN OF CARE
Problem: Patient Care Overview  Goal: Plan of Care Review  Outcome: Ongoing (interventions implemented as appropriate)  Pt went to OR for IVC filter this am and back for femur nailing this afternoon. Pt currently intubated with plans to extubate this evening; wakes/FC. UOP improved this pm, now ~60mL/hr. HTN treated with single dose labetalol. Pt in SR while in ICU. Perineural catheter to right lower abdomen; waiting for ropivicaine infusion.

## 2017-07-03 NOTE — PROGRESS NOTES
Pt. MAP less than 55 and UO dropped to less than 30 2 consecutive hours, MD Young notified. 500 cc BS bolus started    0200- MD Young notified of UO of 20 mL/hr for last consecutive outputs. Will get morning labs and report them to her in order for her to make decision for further treatment.

## 2017-07-03 NOTE — PROGRESS NOTES
[facility]  Critical Care Medicine  Progress Note    Patient Name: Virgil Melo Jr.  MRN: [id number]  Admission Date: 7/1/2017  Hospital Length of Stay: 2 days  Code Status: Full Code  Attending Provider: Surekha Puentes MD  Primary Care Provider: Luis A Milton MD   Principal Problem: Acute saddle pulmonary embolism    Subjective:     HPI:  88 year old man with PMH of AS s/p AVR, Afib, HTN, HLD, presents hypoxic respiratory failure.     Patient found on floor of assisted living facility night of 6/30/17. Fall was not witnessed. Patient does not remember fall. CT head at OSH negative. Transferred to [facility] for evaluation and operative fixation R intertrochanteric fracture.     Patient was desaturating to 76% on 5L nasal cannula, with afib in 90's, and BP systolic>110.He was then placed on nonrebreather and sat 91% at 1209 am, but continues to have ABG PCO2 32.6, PO2 59, HCO3 21.5 O2 91.     No chest pain.     Daughter is medical decision maker.      Hospital/ICU Course:  No notes on file    Interval History/Significant Events: Patient was mildly hypotensive SBP 90's prior to midnight but resolved with 2 L fluid bolus. Urine output has been 20ml/hr. NAEON.     Review of Systems   Respiratory: Positive for shortness of breath. Negative for cough, choking and chest tightness.    Cardiovascular: Negative for chest pain, palpitations and leg swelling.   Gastrointestinal: Negative for abdominal distention, abdominal pain, nausea and vomiting.   Genitourinary: Negative for difficulty urinating, dysuria, flank pain and hematuria.     Objective:     Vital Signs (Most Recent):  Temp: 98.1 °F (36.7 °C) (07/03/17 0300)  Pulse: 74 (07/03/17 0600)  Resp: 18 (07/03/17 0600)  BP: (!) 125/59 (07/03/17 0600)  SpO2: 97 % (07/03/17 0600) Vital Signs (24h Range):  Temp:  [98.1 °F (36.7 °C)-98.4 °F (36.9 °C)] 98.1 °F (36.7 °C)  Pulse:  [69-92] 74  Resp:  [17-25] 18  SpO2:  [90 %-99 %] 97 %  BP: ()/(48-88) 125/59    Weight: 91.6 kg (202 lb)  Body mass index is 27.4 kg/m².      Intake/Output Summary (Last 24 hours) at 07/03/17 0637  Last data filed at 07/03/17 0600   Gross per 24 hour   Intake          1620.44 ml   Output              445 ml   Net          1175.44 ml       Physical Exam   Constitutional: He appears distressed.   Eyes: EOM are normal. Pupils are equal, round, and reactive to light.   Cardiovascular: Regular rhythm and normal heart sounds.    Pulmonary/Chest: Breath sounds normal. He is in respiratory distress. He has no wheezes. He has no rales. He exhibits no tenderness.   Neurological:   Mild confusion- thinks he is in nursing home   Skin: Skin is warm and dry. No rash noted. He is not diaphoretic. No erythema.       Vents:     Lines/Drains/Airways     Drain                 Urethral Catheter 07/01/17 1400 1 day          Pressure Ulcer                 Pressure Ulcer 07/01/17 2020 midline buttocks suspected deep tissue injury 1 day          Peripheral Intravenous Line                 Peripheral IV - Single Lumen 07/01/17 1400 Right Antecubital 1 day         Peripheral IV - Single Lumen 07/02/17 0105 Right Wrist 1 day              Significant Labs:    CBC/Anemia Profile:    Recent Labs  Lab 07/01/17 2045 07/02/17  1120 07/03/17  0209   WBC 13.84* 12.88* 10.96   HGB 12.8* 12.8* 10.9*   HCT 39.1* 39.5* 33.9*   * 121* 127*   MCV 93 93 94   RDW 14.4 14.4 14.4        Chemistries:    Recent Labs  Lab 07/01/17 2046 07/02/17  0728 07/03/17  0209    140 141   K 3.6 4.0 3.5    107 112*   CO2 22* 19* 19*   BUN 29* 29* 39*   CREATININE 1.2 1.1 1.2   CALCIUM 9.0 8.7 7.9*   ALBUMIN 3.4* 3.2* 2.7*   PROT 7.2 7.2 6.1   BILITOT 0.7 0.6 0.5   ALKPHOS 122 110 86   ALT 23 25 20   AST 52* 44* 28   MG 2.1  --  2.2   PHOS 3.1  --  3.3       BMP:   Recent Labs  Lab 07/03/17  0209   *      K 3.5   *   CO2 19*   BUN 39*   CREATININE 1.2   CALCIUM 7.9*   MG 2.2     Lactic Acid: No results for  input(s): LACTATE in the last 48 hours.  Troponin:   Recent Labs  Lab 07/01/17  2046 07/02/17  1246   TROPONINI 0.248* 0.537*     All pertinent labs within the past 24 hours have been reviewed.      Assessment/Plan:     Neuro   Dementia, vascular    Mental status is stable, continue home medications.         Partial epilepsy with impairment of consciousness    Continue Keppra        Pulmonary   * Acute saddle pulmonary embolism    - Large saddle pulmonary embolus with right heart strain found on CTA causing hypoxic respiratory failure. Likely provoked from recent R. Hip fracture.   -Lower extremity U/S showed occlusive DVT on right leg.   - Vascular surgery plan for IVC filter. Continue Heparin drip for OR.   - No acute indication for TPA given hemodynamic stability            Cardiac   Hypertension    -Patient with history of hypertension, well- controlled in hospital.        Atrial fibrillation    -New onset likely from acute strain from saddle embolus  -On heparin drip  -Rate controlled and hemodynamically stable        Endocrine   Hypothyroid    Continue synthroid.        Musculoskeletal and Integument   Closed intertrochanteric fracture of right femur    -Plans for surgery but likely wont proceed as patient not stable, and requiring IVC filter.  -Leave NPO for now.   -Have notified Ortho he is on Heparin drip.               Critical secondary to Patient has a condition that poses threat to life and bodily function: Submassive pulmonary embolism.      Critical care was time spent personally by me on the following activities: development of treatment plan with patient or surrogate and bedside caregivers, discussions with consultants, evaluation of patient's response to treatment, examination of patient, ordering and performing treatments and interventions, ordering and review of laboratory studies, ordering and review of radiographic studies, pulse oximetry, re-evaluation of patient's condition. This critical  care time did not overlap with that of any other provider or involve time for any procedures.     Juan Grijalva MD  Critical Care Medicine  [facility]

## 2017-07-03 NOTE — PROGRESS NOTES
[facility]  Vascular Surgery  Progress Note    Patient Name: Virgil Melo Jr.  MRN: [id number]  Admission Date: 7/1/2017  Primary Care Provider: Luis A Milton MD    Subjective:     Interval History: Given 2L bolus for hypotension with resolution of hypotension. Uop low - 10-20cc/hr. Not on any vasopressors. Oxygen requirements increased to 10L High flow. Remains in afib.     Post-Op Info:  Procedure(s) (LRB):  INSERTION-FILTER-INFERIOR VENA CAVA (N/A)   Day of Surgery       Medications:  Continuous Infusions:   heparin (porcine) in D5W 17 Units/kg/hr (07/03/17 0900)     Scheduled Meds:   atorvastatin  40 mg Oral Daily    calcium-vitamin D3  1 tablet Oral BID    gabapentin  600 mg Oral TID    levetiracetam  750 mg Oral BID    memantine  10 mg Oral Daily    phenobarbital  30 mg Oral TID     PRN Meds:acetaminophen, albuterol-ipratropium 2.5mg-0.5mg/3mL, clindamycin (CLEOCIN) IVPB, heparin (PORCINE), heparin (PORCINE), magnesium oxide, magnesium oxide, morphine, ondansetron, oxycodone, potassium chloride 10%, potassium chloride 10%, potassium chloride 10%, potassium, sodium phosphates, potassium, sodium phosphates, potassium, sodium phosphates, senna-docusate 8.6-50 mg     Objective:     Vital Signs (Most Recent):  Temp: 97.8 °F (36.6 °C) (07/03/17 0700)  Pulse: 68 (07/03/17 0900)  Resp: 20 (07/03/17 0900)  BP: (!) 141/69 (07/03/17 0900)  SpO2: 98 % (07/03/17 0900) Vital Signs (24h Range):  Temp:  [97.8 °F (36.6 °C)-98.4 °F (36.9 °C)] 97.8 °F (36.6 °C)  Pulse:  [68-92] 68  Resp:  [17-25] 20  SpO2:  [90 %-98 %] 98 %  BP: ()/(48-69) 141/69       Date 07/03/17 0700 - 07/04/17 0659   Shift 0799-5591 9161-5370 1467-5324 24 Hour Total   I  N  T  A  K  E   I.V.  (mL/kg) 52.3  (0.6)   52.3  (0.6)    IV Piggyback 500   500    Shift Total  (mL/kg) 552.3  (6)   552.3  (6)   O  U  T  P  U  T   Urine  (mL/kg/hr) 75   75    Shift Total  (mL/kg) 75  (0.8)   75  (0.8)   Weight (kg) 91.6 91.6 91.6  91.6       Physical Exam   WD. NC. AT  Lethargic. Not oriented to place or time.   On HLHF. Unlabored breathing.   Irregularly irregular.   2+ Femoral pulses bilaterally.     Significant Labs:  CBC:   Recent Labs  Lab 07/03/17  0209   WBC 10.96   RBC 3.59*   HGB 10.9*   HCT 33.9*   *   MCV 94   MCH 30.4   MCHC 32.2     CMP:   Recent Labs  Lab 07/03/17  0209   *   CALCIUM 7.9*   ALBUMIN 2.7*   PROT 6.1      K 3.5   CO2 19*   *   BUN 39*   CREATININE 1.2   ALKPHOS 86   ALT 20   AST 28   BILITOT 0.5     Coagulation:   Recent Labs  Lab 07/01/17 2045   LABPROT 10.9   INR 1.0       Significant Diagnostics:  CXR: Suspected interval development of a wedge shape opacity within the periphery of the left lung base concerning for possible developing infarct given patient's known pulmonary thromboemboli.    ECHO: CONCLUSIONS     1 - Low normal to mildly depressed left ventricular systolic function (EF 50-55%).     2 - Concentric remodeling.     3 - Wall motion abnormalities.     4 - Indeterminate LV diastolic function.     5 - Biatrial enlargement.     6 - Right ventricular enlargement with moderately depressed systolic function.     7 - Aortic valve prosthesis, effective prosthetic valve area corrected for BSA is 0.53 cm2.     8 - Mild tricuspid regurgitation.     9 - The estimated PA systolic pressure is 32 mmHg.     Assessment/Plan:     * Acute saddle pulmonary embolism    7 yo M with h/o atrial fibrillation (not on coumadin), CAD s/p PCI, AS s/p porcine AVR 2011 presenting with saddle pulmonary embolus and R intertrochanteric fracture.    Patient currently on 10LNC, wean 02 as tolerated  On heparin gtt  Has ECHO and US venous duplex reviewed - acute R popliteal thrombosis and ECHO showing moderately depressed right ventricle function   To OR today for IVC filter placement           Consent obtained by daughter. Patient unable to consent due to dementia.     Lorenzo Rivera MD  Vascular  Surgery  [facility]

## 2017-07-03 NOTE — PLAN OF CARE
Problem: Patient Care Overview  Goal: Plan of Care Review  Outcome: Ongoing (interventions implemented as appropriate)  Nutrition assessment completed. Please see RD note for details.    Recommendation/Intervention:   1. If able to advance diet, recommend Regular with texture per SLP recommendations.     2. If unable to advance diet, recommend Isosource 1.5 @ goal rate 55mL/hr.  - Initiate @ 15mL/hr and advance by 10mL every 2-4hrs, or as tolerated, until goal rate is reached.   -Hold for residuals >500ml.     RD to monitor.

## 2017-07-03 NOTE — TRANSFER OF CARE
Anesthesia Transfer of Care Note    Patient: Virgil Melo Jr.    Procedure(s) Performed: Procedure(s) (LRB):  IM NAILING OF FEMUR - RIGHT (Right)    Patient location: ICU    Anesthesia Type: general    Transport from OR: Transported from OR intubated on 100% O2 by AMBU with adequate controlled ventilation. Upon arrival to PACU/ICU, patient attached to ventilator and auscultated to confirm bilateral breath sounds and adequate TV. Continuous ECG monitoring in transport. Continuous SpO2 monitoring in transport. Continuos invasive BP monitoring in transport    Post pain: adequate analgesia    Post assessment: no apparent anesthetic complications    Post vital signs: stable    Level of consciousness: sedated    Nausea/Vomiting: no nausea/vomiting    Complications: none    Transfer of care protocol was followed      Last vitals:   Visit Vitals  BP (!) 125/58 (BP Location: Left arm, Patient Position: Lying, BP Method: Automatic)   Pulse 72   Temp 36.8 °C (98.3 °F) (Oral)   Resp (!) 23   Ht 6' (1.829 m)   Wt 91.6 kg (202 lb)   SpO2 95%   BMI 27.40 kg/m²

## 2017-07-03 NOTE — CONSULTS
[facility]  Adult Nutrition  Consult Note    SUMMARY     Recommendations    Recommendation/Intervention:   1. If able to advance diet, recommend Regular with texture per SLP recommendations.     2. If unable to advance diet, recommend Isosource 1.5 @ goal rate 55mL/hr.  - Initiate @ 15mL/hr and advance by 10mL every 2-4hrs, or as tolerated, until goal rate is reached.   -Hold for residuals >500ml.     RD to monitor.      Goals: Pt to receive nutrition by RD follow up date.  Nutrition Goal Status: new  Communication of RD Recs: reviewed with RN    Reason for Assessment    Reason for Assessment: nurse/nurse practitioner consult  Diagnosis: other (see comments) (pulmonary embolism)  Relevent Medical History: HTN, epilepsy, dementia, hypothyroidism         General Information Comments: Pt JESSICA for IVC filter placment. Remains NPO, SLP to see pt.    Nutrition Discharge Planning: unable to determine at this time    Nutrition Prescription Ordered    Current Diet Order: NPO     Evaluation of Received Nutrients/Fluid Intake       % Intake of Estimated Energy Needs: 0 - 25 %  % Meal Intake: NPO     Nutrition Risk Screen          Nutrition/Diet History       Typical Food/Fluid Intake: Pt remains NPO for surgery           Factors Affecting Nutritional Intake: NPO                Labs/Tests/Procedures/Meds       Pertinent Labs Reviewed: reviewed     Pertinent Medications Reviewed: reviewed  Pertinent Medications Comments: statin, vit D, calcium, heparin    Physical Findings    Overall Physical Appearance: nourished, on oxygen therapy        Skin: pressure ulcer(s) (St II buttock)    Anthropometrics    Temp: 97.8 °F (36.6 °C)     Height: 6' (182.9 cm)  Weight Method: Bed Scale  Weight: 91.6 kg (202 lb)  Ideal Body Weight (IBW), Male: 178 lb     % Ideal Body Weight, Male (lb): 113.48 lb     BMI (Calculated): 27.5  BMI Grade: 25 - 29.9 - overweight                            Estimated/Assessed Needs    Weight  Used For Calorie Calculations: 91.6 kg (201 lb 15.1 oz)      Energy Calorie Requirements (kcal): 2030  Energy Need Method: [place]-St Jeor (PAL 1.25)        RMR ([place]-St. Jeor Equation): 1624        Weight Used For Protein Calculations: 91.6 kg (201 lb 15.1 oz)  Protein Requirements: 92-110g (1.0-1.2g/kg)    Fluid Requirements (mL): 1ml/kcal or per MD              RDA Method (mL): 2030               Assessment and Plan    Nutrition Problem  Inadequate energy intake    Related to (etiology):   Inability to consume sufficient energy    Signs and Symptoms (as evidenced by):   NPO, no alternative means of nutrition.     Interventions/Recommendations (treatment strategy):  See RD recs above.     Nutrition Diagnosis Status:   New          Monitor and Evaluation    Food and Nutrient Intake: energy intake, food and beverage intake, enteral nutrition intake  Food and Nutrient Adminstration: diet order, enteral and parenteral nutrition administration        Anthropometric Measurements: weight, weight change, body mass index  Biochemical Data, Medical Tests and Procedures: gastrointestinal profile, electrolyte and renal panel, glucose/endocrine profile, inflammatory profile, lipid profile  Nutrition-Focused Physical Findings: overall appearance    Nutrition Risk    Level of Risk: other (see comments) (f/u 2x/week)    Nutrition Follow-Up    RD Follow-up?: Yes

## 2017-07-03 NOTE — OR NURSING
1428 - to OR 8, via ICU bed, barton catheter in place draining denise urine with dark brownish red sediment to urimeter bag.  Intact to right leg with stat lock.

## 2017-07-03 NOTE — BRIEF OP NOTE
[facility]  Brief Operative Note    SUMMARY     Surgery Date: 7/3/2017     Surgeon(s) and Role:     * Branden Gonzales MD - Primary     * Michael Joseph Casale, MD - Resident - Assisting        Pre-op Diagnosis:  Closed intertrochanteric fracture of right femur, initial encounter [S72.141A]    Post-op Diagnosis:  Post-Op Diagnosis Codes:     * Closed intertrochanteric fracture of right femur, initial encounter [S72.141A]    Procedure(s) (LRB):  IM NAILING OF FEMUR - RIGHT (Right)    Anesthesia: General    Description of Procedure: See Op Note    Estimated Blood Loss: 100cc         Specimens:   Specimen (12h ago through future)    None

## 2017-07-03 NOTE — PROGRESS NOTES
Received pt and placed on  on documented settings. Secured ETT at 23 cm karlos with commercial tube lambert. BBS noted. VSS.

## 2017-07-03 NOTE — PLAN OF CARE
Luis A Milton MD  [address] / [facility] [address]    [facility] [address] [place] [address] - [address] AT [facility] [address]  [address]  [facility] [address]  Phone: [phone] Fax: [phone]    Payor: HU[employer] MANAGED MEDICARE / Plan: [employer]NA[employer]US DIABETES & HEART HMO SNP / Product Type: Medicare Advantage /     No future appointments.  Extended Emergency Contact Information  Primary Emergency Contact: VerónicaMarta  Address: [address] [place] [address] [place]  Mobile Phone: [phone]  Relation: Daughter     07/03/17 1517   Discharge Assessment   Assessment Type Discharge Planning Assessment   Confirmed/corrected address and phone number on facesheet? Yes   Assessment information obtained from? Caregiver;Medical Record   Expected Length of Stay (days) 4   Communicated expected length of stay with patient/caregiver no   Prior to hospitilization cognitive status: Not Oriented to Time   Prior to hospitalization functional status: Assistive Equipment;Needs Assistance   Current cognitive status: Not Oriented to Person;Not Oriented to Place   Current Functional Status: Assistive Equipment;Needs Assistance   Arrived From assisted living  ([facility])   Lives With other (see comments)  (assisted living facility)   Able to Return to Prior Arrangements unable to determine at this time (comments)   Is patient able to care for self after discharge? Unable to determine at this time (comments)   How many people do you have in your home that can help with your care after discharge? other (see comments)  (lives alone but in an assisted living facility)   Who are your caregiver(s) and their phone number(s)? Marta Sanches (Daughter) [phone]    Patient's perception of discharge disposition assisted living   Readmission Within The Last 30 Days no previous admission in last 30 days   Patient currently being followed by  outpatient case management? No   Patient currently receives home health services? No   Does the patient currently use HME? No   Patient currently receives private duty nursing? N/A   Patient currently receives any other outside agency services? No   Equipment Currently Used at Home cane, straight;walker, standard   Do you have any problems affording any of your prescribed medications? No   Is the patient taking medications as prescribed? yes   Do you have any financial concerns preventing you from receiving the healthcare you need? No   Does the patient have transportation to healthcare appointments? Yes   Transportation Available family or friend will provide   On Dialysis? No   Swathi Zelaya RN, BSN  Case Management  [facility]  Ext. [phone]

## 2017-07-03 NOTE — ANESTHESIA PROCEDURE NOTES
Arterial    Diagnosis: Hip Fx    Patient location during procedure: done in OR  Procedure start time: 7/3/2017 10:30 AM  Timeout: 7/3/2017 10:30 AM  Procedure end time: 7/3/2017 10:30 AM  Staffing  Anesthesiologist: MILO AZEVEDO  Resident/CRNA: MADAN FONSECA  Performed: resident/CRNA   Anesthesiologist was present at the time of the procedure.  Preanesthetic Checklist  Completed: patient identified, site marked, surgical consent, pre-op evaluation, timeout performed, IV checked, risks and benefits discussed, monitors and equipment checked and anesthesia consent givenArterial  Skin Prep: chlorhexidine gluconate  Local Infiltration: lidocaine  Orientation: left  Location: radial  Catheter Size: 20 G  Catheter placement by Anatomical landmarks. Heme positive aspiration all ports.Insertion Attempts: 1  Assessment  Dressing: secured with tape and tegaderm  Patient: Tolerated well

## 2017-07-03 NOTE — SUBJECTIVE & OBJECTIVE
Interval History/Significant Events: Patient was mildly hypotensive SBP 90's prior to midnight but resolved with 2 L fluid bolus. Urine output has been 20ml/hr. NAEON.     Review of Systems   Respiratory: Negative for cough, choking, chest tightness and shortness of breath.    Cardiovascular: Negative for chest pain, palpitations and leg swelling.   Gastrointestinal: Negative for abdominal distention, abdominal pain, nausea and vomiting.   Genitourinary: Negative for difficulty urinating, dysuria, flank pain and hematuria.     Objective:     Vital Signs (Most Recent):  Temp: 98.1 °F (36.7 °C) (07/03/17 0300)  Pulse: 74 (07/03/17 0600)  Resp: 18 (07/03/17 0600)  BP: (!) 125/59 (07/03/17 0600)  SpO2: 97 % (07/03/17 0600) Vital Signs (24h Range):  Temp:  [98.1 °F (36.7 °C)-98.4 °F (36.9 °C)] 98.1 °F (36.7 °C)  Pulse:  [69-92] 74  Resp:  [17-25] 18  SpO2:  [90 %-99 %] 97 %  BP: ()/(48-88) 125/59   Weight: 91.6 kg (202 lb)  Body mass index is 27.4 kg/m².      Intake/Output Summary (Last 24 hours) at 07/03/17 0637  Last data filed at 07/03/17 0600   Gross per 24 hour   Intake          1620.44 ml   Output              445 ml   Net          1175.44 ml       Physical Exam   Constitutional: No distress.   Eyes: EOM are normal. Pupils are equal, round, and reactive to light.   Cardiovascular: Regular rhythm and normal heart sounds.    Pulmonary/Chest: Breath sounds normal. No respiratory distress. He has no wheezes. He has no rales. He exhibits no tenderness.   Nasal Cannula    Skin: Skin is warm and dry. No rash noted. He is not diaphoretic. No erythema.       Vents:     Lines/Drains/Airways     Drain                 Urethral Catheter 07/01/17 1400 1 day          Pressure Ulcer                 Pressure Ulcer 07/01/17 2020 midline buttocks suspected deep tissue injury 1 day          Peripheral Intravenous Line                 Peripheral IV - Single Lumen 07/01/17 1400 Right Antecubital 1 day         Peripheral IV -  Single Lumen 07/02/17 0105 Right Wrist 1 day              Significant Labs:    CBC/Anemia Profile:    Recent Labs  Lab 07/01/17 2045 07/02/17  1120 07/03/17  0209   WBC 13.84* 12.88* 10.96   HGB 12.8* 12.8* 10.9*   HCT 39.1* 39.5* 33.9*   * 121* 127*   MCV 93 93 94   RDW 14.4 14.4 14.4        Chemistries:    Recent Labs  Lab 07/01/17 2046 07/02/17  0728 07/03/17  0209    140 141   K 3.6 4.0 3.5    107 112*   CO2 22* 19* 19*   BUN 29* 29* 39*   CREATININE 1.2 1.1 1.2   CALCIUM 9.0 8.7 7.9*   ALBUMIN 3.4* 3.2* 2.7*   PROT 7.2 7.2 6.1   BILITOT 0.7 0.6 0.5   ALKPHOS 122 110 86   ALT 23 25 20   AST 52* 44* 28   MG 2.1  --  2.2   PHOS 3.1  --  3.3       BMP:   Recent Labs  Lab 07/03/17  0209   *      K 3.5   *   CO2 19*   BUN 39*   CREATININE 1.2   CALCIUM 7.9*   MG 2.2     Lactic Acid: No results for input(s): LACTATE in the last 48 hours.  Troponin:   Recent Labs  Lab 07/01/17 2046 07/02/17  1246   TROPONINI 0.248* 0.537*     All pertinent labs within the past 24 hours have been reviewed.

## 2017-07-03 NOTE — TRANSFER OF CARE
Anesthesia Transfer of Care Note    Patient: Virgil Melo Jr.    Procedure(s) Performed: Procedure(s) (LRB):  INSERTION-FILTER-INFERIOR VENA CAVA (N/A)    Patient location: ICU    Anesthesia Type: MAC    Transport from OR: Transported from OR on 6-10 L/min O2 by face mask with adequate spontaneous ventilation. Continuous SpO2 monitoring in transport. Continuous ECG monitoring in transport    Post pain: adequate analgesia    Post assessment: no apparent anesthetic complications    Post vital signs: stable    Level of consciousness: responds to stimulation    Nausea/Vomiting: no nausea/vomiting    Complications: none    Transfer of care protocol was followed      Last vitals:   Visit Vitals  BP (!) 141/69 (BP Location: Left arm, Patient Position: Lying, BP Method: Automatic)   Pulse 68   Temp 36.6 °C (97.8 °F) (Oral)   Resp 20   Ht 6' (1.829 m)   Wt 91.6 kg (202 lb)   SpO2 98%   BMI 27.40 kg/m²

## 2017-07-03 NOTE — PROGRESS NOTES
[facility]  Critical Care Medicine  Progress Note    Patient Name: Virgil Melo Jr.  MRN: [id number]  Admission Date: 7/1/2017  Hospital Length of Stay: 2 days  Code Status: Full Code  Attending Provider: Surekha Puentes MD  Primary Care Provider: Luis A Milton MD   Principal Problem: Acute saddle pulmonary embolism    Subjective:     HPI:  88 year old man with PMH of AS s/p AVR, Afib, HTN, HLD, presents hypoxic respiratory failure.     Patient found on floor of assisted living facility night of 6/30/17. Fall was not witnessed. Patient does not remember fall. CT head at OSH negative. [place] [facility] [place] R intertrochanteric [place].     Patient was desaturating to 76% on 5L nasal cannula, with afib in 90's, and BP systolic>110.He was then placed on nonrebreather and sat 91% at 1209 am.     No chest pain.     Daughter is medical decision maker.      Hospital/ICU Course:  No notes on file    Interval History/Significant Events: Patient was mildly hypotensive SBP 90's prior to midnight but resolved with 2 L fluid bolus. Urine output has been 20ml/hr. NAEON.     Review of Systems   Respiratory: Negative for cough, choking, chest tightness and shortness of breath.    Cardiovascular: Negative for chest pain, palpitations and leg swelling.   Gastrointestinal: Negative for abdominal distention, abdominal pain, nausea and vomiting.   Genitourinary: Negative for difficulty urinating, dysuria, flank pain and hematuria.     Objective:     Vital Signs (Most Recent):  Temp: 98.1 °F (36.7 °C) (07/03/17 0300)  Pulse: 74 (07/03/17 0600)  Resp: 18 (07/03/17 0600)  BP: (!) 125/59 (07/03/17 0600)  SpO2: 97 % (07/03/17 0600) Vital Signs (24h Range):  Temp:  [98.1 °F (36.7 °C)-98.4 °F (36.9 °C)] 98.1 °F (36.7 °C)  Pulse:  [69-92] 74  Resp:  [17-25] 18  SpO2:  [90 %-99 %] 97 %  BP: ()/(48-88) 125/59   Weight: 91.6 kg (202 lb)  Body mass index is 27.4  kg/m².      Intake/Output Summary (Last 24 hours) at 07/03/17 0637  Last data filed at 07/03/17 0600   Gross per 24 hour   Intake          1620.44 ml   Output              445 ml   Net          1175.44 ml       Physical Exam   Constitutional: No distress.   Eyes: EOM are normal. Pupils are equal, round, and reactive to light.   Cardiovascular: Regular rhythm and normal heart sounds.    Pulmonary/Chest: Breath sounds normal. No respiratory distress. He has no wheezes. He has no rales. He exhibits no tenderness.   Nasal Cannula    Skin: Skin is warm and dry. No rash noted. He is not diaphoretic. No erythema.       Vents:     Lines/Drains/Airways     Drain                 Urethral Catheter 07/01/17 1400 1 day          Pressure Ulcer                 Pressure Ulcer 07/01/17 2020 midline buttocks suspected deep tissue injury 1 day          Peripheral Intravenous Line                 Peripheral IV - Single Lumen 07/01/17 1400 Right Antecubital 1 day         Peripheral IV - Single Lumen 07/02/17 0105 Right Wrist 1 day              Significant Labs:    CBC/Anemia Profile:    Recent Labs  Lab 07/01/17 2045 07/02/17  1120 07/03/17  0209   WBC 13.84* 12.88* 10.96   HGB 12.8* 12.8* 10.9*   HCT 39.1* 39.5* 33.9*   * 121* 127*   MCV 93 93 94   RDW 14.4 14.4 14.4        Chemistries:    Recent Labs  Lab 07/01/17 2046 07/02/17  0728 07/03/17  0209    140 141   K 3.6 4.0 3.5    107 112*   CO2 22* 19* 19*   BUN 29* 29* 39*   CREATININE 1.2 1.1 1.2   CALCIUM 9.0 8.7 7.9*   ALBUMIN 3.4* 3.2* 2.7*   PROT 7.2 7.2 6.1   BILITOT 0.7 0.6 0.5   ALKPHOS 122 110 86   ALT 23 25 20   AST 52* 44* 28   MG 2.1  --  2.2   PHOS 3.1  --  3.3       BMP:   Recent Labs  Lab 07/03/17  0209   *      K 3.5   *   CO2 19*   BUN 39*   CREATININE 1.2   CALCIUM 7.9*   MG 2.2     Lactic Acid: No results for input(s): LACTATE in the last 48 hours.  Troponin:   Recent Labs  Lab 07/01/17 2046 07/02/17  1246   TROPONINI 0.248*  0.537*     All pertinent labs within the past 24 hours have been reviewed.      Assessment/Plan:     Neuro   Dementia, vascular    Mental status is stable, continue home medications.         Partial epilepsy with impairment of consciousness    Continue Keppra        Pulmonary   * Acute saddle pulmonary embolism    - Large saddle pulmonary embolus with right heart strain found on CTA causing hypoxic respiratory failure. Likely provoked from recent R. Hip fracture.   -Lower extremity U/S showed occlusive DVT on right leg.   - Vascular surgery plan for IVC filter. Continue Heparin drip for OR.   - No acute indication for TPA given hemodynamic stability            Cardiac   Hypertension    -Patient with history of hypertension, well- controlled in hospital.        Atrial fibrillation    -New onset likely from acute strain from saddle embolus  -On heparin drip  -Rate controlled and hemodynamically stable        Endocrine   Hypothyroid    Continue synthroid.        Musculoskeletal and Integument   Closed intertrochanteric fracture of right femur    -Plans for surgery but likely wont proceed as patient not stable, and requiring IVC filter.  -Leave NPO for now.   -Have notified Ortho he is on Heparin drip.              Critical secondary to Patient has a condition that poses threat to life and bodily function: Pulmonary Embolism      Critical care was time spent personally by me on the following activities: development of treatment plan with patient or surrogate and bedside caregivers, discussions with consultants, evaluation of patient's response to treatment, examination of patient, ordering and performing treatments and interventions, ordering and review of laboratory studies, ordering and review of radiographic studies, pulse oximetry, re-evaluation of patient's condition. This critical care time did not overlap with that of any other provider or involve time for any procedures.     Juan Grijalva MD  Critical Care  Medicine  [facility]

## 2017-07-03 NOTE — ASSESSMENT & PLAN NOTE
- Large saddle pulmonary embolus with right heart strain found on CTA causing hypoxic respiratory failure. Likely provoked from recent R. Hip fracture.   -Lower extremity U/S showed occlusive DVT on right leg.   - Vascular surgery plan for IVC filter. Continue Heparin drip for OR.   - No acute indication for TPA given hemodynamic stability

## 2017-07-03 NOTE — SUBJECTIVE & OBJECTIVE
Medications:  Continuous Infusions:   heparin (porcine) in D5W 17 Units/kg/hr (07/03/17 0900)     Scheduled Meds:   atorvastatin  40 mg Oral Daily    calcium-vitamin D3  1 tablet Oral BID    gabapentin  600 mg Oral TID    levetiracetam  750 mg Oral BID    memantine  10 mg Oral Daily    phenobarbital  30 mg Oral TID     PRN Meds:acetaminophen, albuterol-ipratropium 2.5mg-0.5mg/3mL, clindamycin (CLEOCIN) IVPB, heparin (PORCINE), heparin (PORCINE), magnesium oxide, magnesium oxide, morphine, ondansetron, oxycodone, potassium chloride 10%, potassium chloride 10%, potassium chloride 10%, potassium, sodium phosphates, potassium, sodium phosphates, potassium, sodium phosphates, senna-docusate 8.6-50 mg     Objective:     Vital Signs (Most Recent):  Temp: 97.8 °F (36.6 °C) (07/03/17 0700)  Pulse: 68 (07/03/17 0900)  Resp: 20 (07/03/17 0900)  BP: (!) 141/69 (07/03/17 0900)  SpO2: 98 % (07/03/17 0900) Vital Signs (24h Range):  Temp:  [97.8 °F (36.6 °C)-98.4 °F (36.9 °C)] 97.8 °F (36.6 °C)  Pulse:  [68-92] 68  Resp:  [17-25] 20  SpO2:  [90 %-98 %] 98 %  BP: ()/(48-69) 141/69       Date 07/03/17 0700 - 07/04/17 0659   Shift 5566-0808 4240-0066 2862-0836 24 Hour Total   I  N  T  A  K  E   I.V.  (mL/kg) 52.3  (0.6)   52.3  (0.6)    IV Piggyback 500   500    Shift Total  (mL/kg) 552.3  (6)   552.3  (6)   O  U  T  P  U  T   Urine  (mL/kg/hr) 75   75    Shift Total  (mL/kg) 75  (0.8)   75  (0.8)   Weight (kg) 91.6 91.6 91.6 91.6       Physical Exam   WD. NC. AT  Lethargic. Not oriented to place or time.   On HLHF. Unlabored breathing.   Irregularly irregular.   2+ Femoral pulses bilaterally.     Significant Labs:  CBC:   Recent Labs  Lab 07/03/17 0209   WBC 10.96   RBC 3.59*   HGB 10.9*   HCT 33.9*   *   MCV 94   MCH 30.4   MCHC 32.2     CMP:   Recent Labs  Lab 07/03/17 0209   *   CALCIUM 7.9*   ALBUMIN 2.7*   PROT 6.1      K 3.5   CO2 19*   *   BUN 39*   CREATININE 1.2   ALKPHOS 86   ALT  20   AST 28   BILITOT 0.5     Coagulation:   Recent Labs  Lab 07/01/17 2045   LABPROT 10.9   INR 1.0       Significant Diagnostics:  CXR: Suspected interval development of a wedge shape opacity within the periphery of the left lung base concerning for possible developing infarct given patient's known pulmonary thromboemboli.    ECHO: CONCLUSIONS     1 - Low normal to mildly depressed left ventricular systolic function (EF 50-55%).     2 - Concentric remodeling.     3 - Wall motion abnormalities.     4 - Indeterminate LV diastolic function.     5 - Biatrial enlargement.     6 - Right ventricular enlargement with moderately depressed systolic function.     7 - Aortic valve prosthesis, effective prosthetic valve area corrected for BSA is 0.53 cm2.     8 - Mild tricuspid regurgitation.     9 - The estimated PA systolic pressure is 32 mmHg.

## 2017-07-04 NOTE — NURSING
Tolerating diet; ate 50% breakfast; O2 weaned from 5L HF NC to 2 L NC, maintaining sats >88; left radial nicolas DC, manual pressure held for hemostasis

## 2017-07-04 NOTE — PLAN OF CARE
Problem: SLP Goal  Goal: SLP Goal  Short Term Goals:   1. Pt will participate in a bedside swallow study to determine the safest and least restrictive possible po diet with possible updated goals to follow pending results. -MET    Outcome: Outcome(s) achieved Date Met: 07/04/17  Pt seen for a bedside swallow study. Pt presents with no overt s/s of aspiration, penetration, or dysphagia per subjective bedside assessment. No further skilled ST services warranted at this time. Please re-consult as needed.   GERARD Zuñiga., CCC-SLP  07/04/2017

## 2017-07-04 NOTE — NURSING
Anti-Xa=0.17; bolus of 30 unit/kg, followed by increase rate 4 units; now infusing at 17 units/kg/hr; anti-Xa entered for 1180

## 2017-07-04 NOTE — PT/OT/SLP EVAL
Physical Therapy  Evaluation    Virgil Melo Jr.   MRN: [id number]   Admitting Diagnosis: Acute saddle pulmonary embolism    PT Received On: 07/04/17  PT Start Time: 1415     PT Stop Time: 1448    PT Total Time (min): 33 min       Billable Minutes:  Evaluation 23 Therapeutic activity 10    Diagnosis: Acute saddle pulmonary embolism    Comorbidities and personal factors that affect the PT plan of care or the patient's ability to participation or progress with therapy:  1. R intertrochanteric fracture s/p R IMN  2. Dementia at baseline  3. Scoliosis  4. Osteoporosis  5. A-fib     Clinical Presentation: evolving/changing characteristics   Patient presents with R hip fracture complicated by respiratory complications and saddle PE s/p IVC filter.  Pt stable on oxygen and continuous pulse ox with vitals monitored continuously during session.  He presented with decreased orientation and decreased carryover during session. Pt will need repetition to improve learning and retention.     Level of Complexity:   Moderate Complexity:   · At least 1-2 personal factors or comorbidities that impact the plan of care  · Examination addressing at least 3 body structures and functions, activity limitations, and/or participation restrictions  · Clinical presentation with evolving or changing characteristics      Past Medical History:   Diagnosis Date    Aortic stenosis     BPH (benign prostatic hypertrophy)     HEARING LOSS     uses hearing aids    Hypertension     Hypothyroidism     Localization-related (focal) (partial) epilepsy and epileptic syndromes with complex partial seizures, without mention of intractable epilepsy     Memory loss     Other and unspecified hyperlipidemia     Scoliosis (and kyphoscoliosis), idiopathic     Seizures     Stroke       Past Surgical History:   Procedure Laterality Date    AORTIC VALVE REPLACEMENT  August 2011    CARDIAC CATHETERIZATION  6/11    mild non-obstructive CAD    CARDIAC VALVE  "SURGERY  8/11    23 mm procine AVR    CATARACT EXTRACTION, BILATERAL      CHOLECYSTECTOMY      HEMORRHOID SURGERY         Referring physician: Michael Casale, MD  Date referred to PT: 7/3/2017    General Precautions: Standard, fall (delerium)  Orthopedic Precautions:  (WBAT RLE)     Patient History:  Living Environment Comment: Pt lives in an assisted living apartment. He was independent with dressing, bathing and mobility without an assistive device. Assisted living provided meals and housecleaning.  Pt used shower chair but no other equipment.   Equipment Currently Used at Home: shower chair    Subjective:  Communicated with RN prior to session.  "I don't know where I am.  I'd like to go back to bed.   Chief Complaint: "I', just an old man and I want to go back to bed."  Patient goals: "I'd like to walk by myself if I can, even if I have to use a walker"    Pain/Comfort  Pain Rating 1: 0/10 (at rest)  Pain Addressed 1: Pre-medicate for activity (PCEA)  Pain Rating Post-Intervention 1: 3/10 (after mobilization )      Objective:   Patient found with: blood pressure cuff, barton catheter, pressure relief boots, oxygen, PCEA, peripheral IV, telemetry   Patient found supine in bed with HOB elevated and sister in law present.  Pt agreed to therapy session     Cognitive Exam:  Oriented to: person only.  Therapist re-oriented patient to place, time and situation     Follows Commands/attention: Follows one-step commands after several attempts  Communication: clear/fluent  Safety awareness/insight to disability: impaired    Physical Exam:  Postural examination/scapula alignment: Rounded shoulder and Scoliosis    Skin integrity: surgical dressing in place, c/d/i  Edema: Mild RLE    Sensation:   Intact     Lower Extremity Range of Motion:  Right Lower Extremity: WFL passive  Left Lower Extremity: WFL    Lower Extremity Strength:  Right Lower Extremity: hip 1/5, knee 1/5, ankle 3/5  Left Lower Extremity: WFL     Fine motor " "coordination:  Intact    Gross motor coordination: bradykinesia      Functional Mobility:  Bed Mobility:  Rolling/Turning to Left: Maximum assistance  Rolling/Turning Right: Maximum assistance  Scooting/Bridging: Total Assistance  Supine to Sit: Total Assistance (max assist of 2 people)  Sit to Supine: Total Assistance (max assist of 1 and mod assist of one (for RLE management))    Transfers:  Sit <> Stand Assistance: Maximum Assistance (with RW and patient unable to fully extend hips or knees.  Pt stands in a "stooped"posture.  3 trials performed with similar results. )    Gait:    NT    Balance:   Static Sit: POOR: Needs MODERATE assist to maintain  Dynamic Sit: FAIR: Cannot move trunk without losing balance  Static Stand: 0: Needs MAXIMAL assist to maintain   Dynamic stand: 0: N/A    Therapeutic Activities and Exercises:  Therapist educated patient on the following:   · Role of PT, POC  · WBAT and use of RW  · Importance of mobilization     Static sitting EOB.   · Pt presented with poor sitting balance and poor righting reactions.   · He required max assist for sitting balance initially.  Once positioned patient continued to experience posterior LOB.   · Therapist coached patient in anterior trunk lean and UE support to improve sitting balance  · Balance improved, but only statically.  LOB with trunk movements and with unsupported sitting.     Sit to stand performed 3 x from EOB to improve standing posture.      AM-PAC 6 CLICK MOBILITY  How much help from another person does this patient currently need?   1 = Unable, Total/Dependent Assistance  2 = A lot, Maximum/Moderate Assistance  3 = A little, Minimum/Contact Guard/Supervision  4 = None, Modified [place]/Independent    Turning over in bed (including adjusting bedclothes, sheets and blankets)?: 2  Sitting down on and standing up from a chair with arms (e.g., wheelchair, bedside commode, etc.): 1  Moving from lying on back to sitting on the side of the " bed?: 1  Moving to and from a bed to a chair (including a wheelchair)?: 1  Need to walk in hospital room?: 1  Climbing 3-5 steps with a railing?: 1  Total Score: 7     AM-PAC Raw Score CMS G-Code Modifier Level of Impairment Assistance   6 % Total / Unable   7 - 9 CM 80 - 100% Maximal Assist   10 - 14 CL 60 - 80% Moderate Assist   15 - 19 CK 40 - 60% Moderate Assist   20 - 22 CJ 20 - 40% Minimal Assist   23 CI 1-20% SBA / CGA   24 CH 0% Independent/ Mod I     Patient left supine with all lines intact, call button in reach and sister in law present.    Assessment:   Virgil Melo Jr. is a 88 y.o. male with a medical diagnosis of Acute saddle pulmonary embolism and presents with decreased functional mobility and below deficits.  He c/o minimal pain from recent R hip IMN.  During bed mobility patient exhibited bradykinesia and impaired balance.  He has dementia at baseline, but was able to perform mobility tasks without assistance.  He needs skilled PT services to progress mobility and return to gait.  He is expected to require increased time to return to PLOF due to impaired cognition, decreased retention, and bradykinesia.    Rehab identified problem list/impairments: Rehab identified problem list/impairments: weakness, impaired self care skills, impaired functional mobilty, decreased lower extremity function, impaired coordination, pain, impaired skin, orthopedic precautions    Rehab potential is fair.    Activity tolerance: Fair    Discharge recommendations: Discharge Facility/Level Of Care Needs: nursing facility, skilled     Barriers to discharge: Barriers to Discharge: Decreased caregiver support    Equipment recommendations: Equipment Needed After Discharge:  (TBD)     GOALS:    Physical Therapy Goals        Problem: Physical Therapy Goal    Goal Priority Disciplines Outcome Goal Variances Interventions   Physical Therapy Goal     PT/OT, PT Ongoing (interventions implemented as appropriate)      Description:  Goals to be met by: 2017     Patient will increase functional independence with mobility by performin. Supine to sit with MInimal Assistance  2. Sit to supine with MInimal Assistance  3. Rolling to Left and Right with Stand-by Assistance.  4. Sit to stand transfer with Minimal Assistance  5. Bed to chair transfer with Minimal Assistance using Rolling Walker  6. Gait  x 50 feet with Minimal Assistance using Rolling Walker.   7. Lower extremity exercise program x15 reps per handout, with assistance as needed (not issued upon eval)                      PLAN:    Patient to be seen 5 x/week to address the above listed problems via gait training, therapeutic activities, therapeutic exercises  Plan of Care expires: 17  Plan of Care reviewed with: patient (sister-in law)          Janina Cevallos, PT  2017

## 2017-07-04 NOTE — PLAN OF CARE
Problem: Patient Care Overview  Goal: Plan of Care Review  Outcome: Ongoing (interventions implemented as appropriate)  No acute events during shift; O2 weaned down to 1 L (>88% sat goal); heparin infusing per nomogram; sat up on side of bed with RN; PT also worked with pt and stood up; minimal pain; ropivacaine continous perineural at 8 cc/hr; barton DC this PM, pt due to void, verbalizes understanding; did not require PRN hydralazine; IS at bedside, pt reminded of use; R leg/hip incisions remain clean, dry, and intact; pt to step down to floor tonight, family at bedside verbalizes understanding

## 2017-07-04 NOTE — PROVIDER TRANSFER
[facility]  Critical Care Medicine  Transfer of Care Note      Patient Name: Virgil Melo Jr.  MRN: [id number]  Admission Date: 7/1/2017  Hospital Length of Stay: 3 days  Transfer/Stepdown Date: 7/4/2017  Attending Physician: Surekha Puentes MD   Primary Care Provider: Luis A Milton MD  Reason for Admission: Acute hypoxemic respiratory failure    HPI:   88 year old man with PMH of AS s/p AVR, Afib, HTN, HLD, presents hypoxic respiratory failure.     Patient found on floor of assisted living facility night of 6/30/17. Fall was not witnessed. Patient does not remember fall. CT head at OSH negative. [place] [facility] [place] R intertrochanteric fracture.     Patient was desaturating to 76% on 5L nasal cannula, with afib in 90's, and BP systolic>110.He was then placed on nonrebreather and sat 91% at 1209 am.     No chest pain.     Daughter is medical decision maker.      Hospital Course:   S/p IVC filter placement (7/3/2017) and intramedullary nailing of right femur  (7/3/2017)    Neuro   Dementia, vascular    Mental status is stable, continue home medications.         Partial epilepsy with impairment of consciousness    Continue Keppra        Pulmonary   * Acute saddle pulmonary embolism    Large saddle pulmonary embolus with right heart strain found on CTA causing hypoxemic respiratory failure. Likely provoked from recent R hip fracture.   POD1 s/p IVC filter placement.   Now extubated and sat 90%i on high flow 5L O2.     Continue heparin for anticoagulation.     Blood pressure is elevated 190's, Schedule Coreg 12.5 BID  Ready for step down to floor.             Cardiac   Atrial fibrillation    -New onset likely from acute strain from saddle embolus  -On heparin drip  -Rate controlled and hemodynamically stable        Hypertension    -Patient with history of hypertension, but concerned about BP in 180's.   -Scheduled Coreg 12.5 BID        Endocrine   Hypothyroid     Continue synthroid.        Musculoskeletal and Integument   Closed intertrochanteric fracture of right femur s/p IMN on 7/3/17    POD1 s/p IM nailing Right Femur   incision C/D/I  Continuous infusion of nopivicaine and not complaining of pain.   Discontinue nopivicaine??---  Post op clindamycin.             Consults         Status Ordering Provider     Inpatient consult to Cardiology  Once     Provider:  (Not yet assigned)    Completed CAROLEE VARGAS     Inpatient consult to Critical Care Medicine  Once     Provider:  (Not yet assigned)    Completed CAROLEE VARGAS     Inpatient consult to Orthopedic Surgery  Once     Provider:  (Not yet assigned)    Completed CAROLEE VARGAS     Inpatient consult to Vascular Surgery  Once     Provider:  (Not yet assigned)    Completed TORIN SIDDIQUI     IP consult to dietary  Once     Provider:  (Not yet assigned)    Completed TRACIE CARDONA        Procedure(s) (LRB):  IM NAILING OF FEMUR - RIGHT (Right)    Diet Orders          Diet Adult Regular: Regular starting at 07/04 0914        Activity Orders          Weight bearing status starting at 07/03 1703    Bed rest starting at 07/01 2030        Pending Diagnostic Studies:     Procedure Component Value Units Date/Time    Troponin I [[id number]]     Order Status:  Sent Lab Status:  No result     Specimen:  Blood from Blood     Troponin I [[id number]]     Order Status:  Sent Lab Status:  No result     Specimen:  Blood from Blood         Juan Grijalva MD  Critical Care Medicine  [facility]

## 2017-07-04 NOTE — ASSESSMENT & PLAN NOTE
Large saddle pulmonary embolus with right heart strain found on CTA causing hypoxemic respiratory failure. Likely provoked from recent R hip fracture.   POD1 s/p IVC filter placement.   Now extubated and sat 90%i on high flow 5L O2.     Continue heparin for anticoagulation.     Blood pressure is elevated 190's, Schedule Coreg 12.5 BID  Ready for step down to floor.

## 2017-07-04 NOTE — ASSESSMENT & PLAN NOTE
7 yo M with h/o atrial fibrillation (not on coumadin), CAD s/p PCI, AS s/p porcine AVR 2011 presenting with saddle pulmonary embolus and R intertrochanteric fracture, IVC filter placed 07/03    Anticoagulation per primary for PE   F/u in 2 months outpatient for possible IVC retrieval   Will sign off, please call with questions

## 2017-07-04 NOTE — ANESTHESIA POST-OP PAIN MANAGEMENT
Acute Pain Service Progress Note    Virgil Melo Jr. is a 88 y.o., male, [id number] with PMHx of HTN, HLD, PVD, BPH, hypothyroidism, a fib, AS (s/p AVR), partial epilepsy, PE (s/p IVC filter placement), and OA who is POD 1 s/p IM nailing of R femur.    Surgery:  IM Nailing of Femur- Right    Post Op Day #: 1    Catheter type: perineural  Suprainguinal Fascia Iliaca     Infusion type: Ropivacaine 0.2%  8 mL/hr     Problem List:    Active Hospital Problems    Diagnosis  POA    *Acute saddle pulmonary embolism [I26.92]  Yes    Femur fracture, right [S72.91XA]  Yes    Atrial fibrillation [I48.91]  Yes    Unsteady gait [R26.81]  Yes    Acute respiratory failure with hypoxia [J96.01]  Yes    Tachycardia [R00.0]  Unknown    Closed intertrochanteric fracture of right femur s/p IMN on 7/3/17 [S72.141A]  Yes    Hypothyroid [E03.9]  Yes    Dementia, vascular [F01.50]  Yes    Hypertension [I10]  Yes    Partial epilepsy with impairment of consciousness [G40.209]  Yes      Resolved Hospital Problems    Diagnosis Date Resolved POA   No resolved problems to display.       Subjective:     General appearance of alert, oriented, no complaints   Pain with rest: 3    Numbers   Pain with movement: 6    Numbers   Side Effects    1. Pruritis No    2. Nausea No    3. Motor Blockade No, 0=Ability to raise lower extremities off bed    4. Sedation No, 1=awake and alert    Objective:     Catheter site clean, dry, intact        Vitals   Vitals:    07/04/17 0500   BP:    Pulse: 78   Resp: 19   Temp:         Labs    Admission on 07/01/2017   No results displayed because visit has over 200 results.           Meds   Current Facility-Administered Medications   Medication Dose Route Frequency Provider Last Rate Last Dose    0.9%  NaCl infusion (for blood administration)   Intravenous Q24H PRN Michael Joseph Casale, MD        acetaminophen tablet 650 mg  650 mg Oral Q6H PRN Ramírez Weston DO        albuterol-ipratropium 2.5mg-0.5mg/3mL  nebulizer solution 3 mL  3 mL Nebulization Q4H PRN Arup K. Trista, DO        atorvastatin tablet 40 mg  40 mg Oral Daily Arup ROSAURA Trista, DO   40 mg at 07/02/17 1011    calcium-vitamin D3 500 mg(1,250mg) -200 unit per tablet 1 tablet  1 tablet Oral BID Arup KElijah Trista, DO   1 tablet at 07/03/17 2100    carvedilol tablet 6.25 mg  6.25 mg Oral BID Patricia Luevano MD   6.25 mg at 07/04/17 0000    clindamycin 900 MG/50 ML D5W 900 mg/50 mL IVPB 900 mg  900 mg Intravenous Q12H Michael Joseph Casale, MD 50 mL/hr at 07/04/17 0018 900 mg at 07/04/17 0018    gabapentin capsule 600 mg  600 mg Oral TID Arsiomara Bustilloh, DO   600 mg at 07/04/17 0620    heparin 25,000 units in dextrose 5% (100 units/ml) IV bolus from bag  15 Units/kg (Adjusted) Intravenous PRN PROSPER Bai        heparin 25,000 units in dextrose 5% 250 mL (100 units/mL) bolus from bag; ADDITIONAL PRN BOLUS  30 Units/kg (Adjusted) Intravenous PRN PROSPER Bai        heparin 25,000 units in dextrose 5% 250 mL (100 units/mL) infusion  17 Units/kg/hr (Adjusted) Intravenous Continuous PROSPER Bai 13.3 mL/hr at 07/04/17 0600 15.986 Units/kg/hr at 07/04/17 0600    hydrALAZINE injection 10 mg  10 mg Intravenous Q6H PRN Patricia Luevano MD        levetiracetam tablet 750 mg  750 mg Oral BID Ramírez Bustilloh, DO   750 mg at 07/03/17 2155    magnesium oxide tablet 800 mg  800 mg Oral PRN Marbella Young MD        magnesium oxide tablet 800 mg  800 mg Oral PRN Marbella Young MD        memantine tablet 10 mg  10 mg Oral Daily Arup ROSAURA Trista, DO   10 mg at 07/02/17 1011    morphine injection 4 mg  4 mg Intravenous Q4H PRN Ramírez Bustilloh, DO   4 mg at 07/01/17 2136    niCARdipine 40 mg/200 mL infusion  5 mg/hr Intravenous Continuous PROSPER Bai   Stopped at 07/03/17 2300    ondansetron injection 4 mg  4 mg Intravenous Q6H PRN Arup KElijah Weston,         oxycodone immediate release tablet 5 mg  5 mg Oral Q4H PRN Arup K. Trista, DO    5 mg at 07/02/17 0724    phenobarbital tablet 30 mg  30 mg Oral TID Ramírez ROSAURA Bustilloh, DO   30 mg at 07/04/17 0620    potassium chloride 10% solution 40 mEq  40 mEq Oral PRN Marbella Young MD   40 mEq at 07/03/17 0520    potassium chloride 10% solution 40 mEq  40 mEq Oral PRN Marbella Young MD        potassium chloride 10% solution 60 mEq  60 mEq Oral PRN Marbella Young MD        potassium, sodium phosphates 280-160-250 mg packet 2 packet  2 packet Oral PRN Marbella Young MD        potassium, sodium phosphates 280-160-250 mg packet 2 packet  2 packet Oral PRN Marbella Young MD        potassium, sodium phosphates 280-160-250 mg packet 2 packet  2 packet Oral PRN Marbella Young MD        ropivacaine (PF) 2 mg/ml (0.2%) infusion  8 mL/hr Perineural Continuous Obinna Lee MD 8 mL/hr at 07/04/17 0600 8 mL/hr at 07/04/17 0600    senna-docusate 8.6-50 mg per tablet 1 tablet  1 tablet Oral BID PRN Kimberley Del Real MD             Assessment:  Virgil Melo Jr. is a 88 y.o., male, [id number] with PMHx of HTN, HLD, PVD, BPH, hypothyroidism, a fib, AS (s/p AVR), partial epilepsy, PE (s/p IVC filter placement), and OA who is POD 1 s/p IM nailing of R femur.    Pain control adequate    Plan:    Patient doing well, continue present treatment. Pt pain well controlled with infusion catheter and PRN Oxycodone. Will cont current management and f/u with primary team regarding discharge planning.    Evaluator Obinna Lee

## 2017-07-04 NOTE — PLAN OF CARE
Problem: Physical Therapy Goal  Goal: Physical Therapy Goal  Goals to be met by: 2017     Patient will increase functional independence with mobility by performin. Supine to sit with MInimal Assistance  2. Sit to supine with MInimal Assistance  3. Rolling to Left and Right with Stand-by Assistance.  4. Sit to stand transfer with Minimal Assistance  5. Bed to chair transfer with Minimal Assistance using Rolling Walker  6. Gait  x 50 feet with Minimal Assistance using Rolling Walker.   7. Lower extremity exercise program x15 reps per handout, with assistance as needed (not issued upon eval)    Outcome: Ongoing (interventions implemented as appropriate)  Initial eval completed. Results, POC and goals discussed with patient.

## 2017-07-04 NOTE — HOSPITAL COURSE
07/03: Placement of IVC filter by Vascular surgery  07/03: Right IM nailing of femur by Orthopaedic Surgery

## 2017-07-04 NOTE — PROGRESS NOTES
[facility]  Vascular Surgery  Progress Note    Patient Name: Virgil Melo Jr.  MRN: [id number]  Admission Date: 7/1/2017  Primary Care Provider: Luis A Milton MD    Subjective:     Interval History: Tolerated both Right IM nailing by orthopaedics and IVC filter placement. Extubated and now weaned back to NC. VSS.     Post-Op Info:  Procedure(s) (LRB):  IM NAILING OF FEMUR - RIGHT (Right)   1 Day Post-Op       Medications:  Continuous Infusions:   heparin (porcine) in D5W Stopped (07/04/17 0845)    nicardipine Stopped (07/03/17 2300)    ropivacaine (PF) 2 mg/ml (0.2%) 8 mL/hr (07/04/17 0800)     Scheduled Meds:   atorvastatin  40 mg Oral Daily    calcium-vitamin D3  1 tablet Oral BID    carvedilol  6.25 mg Oral BID    clindamycin (CLEOCIN) IVPB  900 mg Intravenous Q12H    gabapentin  600 mg Oral TID    levetiracetam  750 mg Oral BID    memantine  10 mg Oral Daily    phenobarbital  30 mg Oral TID     PRN Meds:sodium chloride, acetaminophen, albuterol-ipratropium 2.5mg-0.5mg/3mL, heparin (PORCINE), heparin (PORCINE), hydrALAZINE, magnesium oxide, magnesium oxide, morphine, ondansetron, oxycodone, potassium chloride 10%, potassium chloride 10%, potassium chloride 10%, potassium, sodium phosphates, potassium, sodium phosphates, potassium, sodium phosphates, senna-docusate 8.6-50 mg     Objective:     Vital Signs (Most Recent):  Temp: 98.7 °F (37.1 °C) (07/04/17 0700)  Pulse: 76 (07/04/17 0800)  Resp: 19 (07/04/17 0800)  BP: (!) 155/87 (07/04/17 0700)  SpO2: (!) 93 % (07/04/17 0800) Vital Signs (24h Range):  Temp:  [97.9 °F (36.6 °C)-98.7 °F (37.1 °C)] 98.7 °F (37.1 °C)  Pulse:  [60-89] 76  Resp:  [14-31] 19  SpO2:  [90 %-100 %] 93 %  BP: (125-207)/() 155/87  Arterial Line BP: (154-228)/() 187/83       Date 07/04/17 0700 - 07/05/17 0659   Shift 4335-3731 4770-9526 2088-5325 24 Hour Total   I  N  T  A  K  E   P.O. 0   0    I.V.  (mL/kg) 42.6  (0.5)   42.6  (0.5)    Shift  Total  (mL/kg) 42.6  (0.5)   42.6  (0.5)   O  U  T  P  U  T   Urine  (mL/kg/hr) 175   175    Shift Total  (mL/kg) 175  (1.9)   175  (1.9)   Weight (kg) 91.6 91.6 91.6 91.6       Physical Exam  WD. NC. AT  Lethargic. Not oriented to place or time.   On NC. Unlabored breathing.   Irregularly irregular.   2+ Femoral pulses bilaterally. R groin small incision. Soft, no hematoma.     Significant Labs:  ABGs:   Recent Labs  Lab 07/03/17  1642   PH 7.293*   PCO2 38.7   PO2 67*   HCO3 18.8*   POCSATURATED 91*   BE -8     CBC:   Recent Labs  Lab 07/04/17  0754   WBC 9.11   RBC 3.52*   HGB 10.8*   HCT 32.6*   *   MCV 93   MCH 30.7   MCHC 33.1     CMP:   Recent Labs  Lab 07/04/17  0345   *  146*   CALCIUM 7.9*  7.9*   ALBUMIN 2.7*   PROT 6.5     138   K 4.3  4.3   CO2 19*  19*     109   BUN 23  23   CREATININE 0.9  0.9   ALKPHOS 95   ALT 24   AST 29   BILITOT 0.6     Coagulation:   Recent Labs  Lab 07/04/17  0345   APTT 82.7*       Significant Diagnostics:  None       Assessment/Plan:     * Acute saddle pulmonary embolism    9 yo M with h/o atrial fibrillation (not on coumadin), CAD s/p PCI, AS s/p porcine AVR 2011 presenting with saddle pulmonary embolus and R intertrochanteric fracture, IVC filter placed 07/03    Anticoagulation per primary for PE   F/u in 2 months outpatient for possible IVC retrieval   Will sign off, please call with questions             Lorenzo Rivera MD  Vascular Surgery  [facility]    Vascular Attending  Agree with above  He can fu with me in clinic within 8-10 weeks to monitor progress and discuss whether or not the IVC filter will be retrieved in near future. Cont anticoagulation when deemed safe by Ortho.    Dino Bowens MD FACS

## 2017-07-04 NOTE — ASSESSMENT & PLAN NOTE
-Patient with history of hypertension, but concerned about BP in 180's.   -Scheduled Coreg 12.5 BID

## 2017-07-04 NOTE — ASSESSMENT & PLAN NOTE
POD1 s/p IM nailing Right Femur   incision C/D/I  Continuous infusion of nopivicaine and not complaining of pain.   Discontinue nopivicaine??---  Post op clindamycin.

## 2017-07-04 NOTE — SUBJECTIVE & OBJECTIVE
Medications:  Continuous Infusions:   heparin (porcine) in D5W Stopped (07/04/17 0845)    nicardipine Stopped (07/03/17 2300)    ropivacaine (PF) 2 mg/ml (0.2%) 8 mL/hr (07/04/17 0800)     Scheduled Meds:   atorvastatin  40 mg Oral Daily    calcium-vitamin D3  1 tablet Oral BID    carvedilol  6.25 mg Oral BID    clindamycin (CLEOCIN) IVPB  900 mg Intravenous Q12H    gabapentin  600 mg Oral TID    levetiracetam  750 mg Oral BID    memantine  10 mg Oral Daily    phenobarbital  30 mg Oral TID     PRN Meds:sodium chloride, acetaminophen, albuterol-ipratropium 2.5mg-0.5mg/3mL, heparin (PORCINE), heparin (PORCINE), hydrALAZINE, magnesium oxide, magnesium oxide, morphine, ondansetron, oxycodone, potassium chloride 10%, potassium chloride 10%, potassium chloride 10%, potassium, sodium phosphates, potassium, sodium phosphates, potassium, sodium phosphates, senna-docusate 8.6-50 mg     Objective:     Vital Signs (Most Recent):  Temp: 98.7 °F (37.1 °C) (07/04/17 0700)  Pulse: 76 (07/04/17 0800)  Resp: 19 (07/04/17 0800)  BP: (!) 155/87 (07/04/17 0700)  SpO2: (!) 93 % (07/04/17 0800) Vital Signs (24h Range):  Temp:  [97.9 °F (36.6 °C)-98.7 °F (37.1 °C)] 98.7 °F (37.1 °C)  Pulse:  [60-89] 76  Resp:  [14-31] 19  SpO2:  [90 %-100 %] 93 %  BP: (125-207)/() 155/87  Arterial Line BP: (154-228)/() 187/83       Date 07/04/17 0700 - 07/05/17 0659   Shift 9406-1570 4756-5891 3964-0115 24 Hour Total   I  N  T  A  K  E   P.O. 0   0    I.V.  (mL/kg) 42.6  (0.5)   42.6  (0.5)    Shift Total  (mL/kg) 42.6  (0.5)   42.6  (0.5)   O  U  T  P  U  T   Urine  (mL/kg/hr) 175   175    Shift Total  (mL/kg) 175  (1.9)   175  (1.9)   Weight (kg) 91.6 91.6 91.6 91.6       Physical Exam  WD. NC. AT  Lethargic. Not oriented to place or time.   On NC. Unlabored breathing.   Irregularly irregular.   2+ Femoral pulses bilaterally. R groin small incision. Soft, no hematoma.     Significant Labs:  ABGs:   Recent Labs  Lab 07/03/17  4972    PH 7.293*   PCO2 38.7   PO2 67*   HCO3 18.8*   POCSATURATED 91*   BE -8     CBC:   Recent Labs  Lab 07/04/17  0754   WBC 9.11   RBC 3.52*   HGB 10.8*   HCT 32.6*   *   MCV 93   MCH 30.7   MCHC 33.1     CMP:   Recent Labs  Lab 07/04/17  0345   *  146*   CALCIUM 7.9*  7.9*   ALBUMIN 2.7*   PROT 6.5     138   K 4.3  4.3   CO2 19*  19*     109   BUN 23  23   CREATININE 0.9  0.9   ALKPHOS 95   ALT 24   AST 29   BILITOT 0.6     Coagulation:   Recent Labs  Lab 07/04/17 0345   APTT 82.7*       Significant Diagnostics:  None

## 2017-07-04 NOTE — ASSESSMENT & PLAN NOTE
- Antibiotics: post-op Clinda  - Weight bearing status: WBAT RLE  - Labs: reviewed  - DVT Prophylaxis: anticoagulated with heparin gtt  - Lines/Drains: PIV, barton  - Pain control: PNC per anesthesia acute pain service

## 2017-07-04 NOTE — PT/OT/SLP EVAL
Speech Language Pathology  Bedside Swallow Study  Evaluation/Discharge    Virgil Melo Jr.   MRN: [id number]   [id number] A    Admitting Diagnosis: Acute saddle pulmonary embolism    Diet recommendations: Solid Diet Level: Regular  Liquid Diet Level: Thin Universal aspiration precautions    SLP Treatment Date: 07/04/17  Speech Start Time: 0651     Speech Stop Time: 0700     Speech Total (min): 9 min       TREATMENT BILLABLE MINUTES:  Eval Swallow and Oral Function 9    Diagnosis: Acute saddle pulmonary embolism    Results for orders placed or performed during the hospital encounter of 07/01/17   X-Ray Chest 1 View    Narrative    Chest, 1 view    Comparison: July 3, 2017    Findings: Unchanged ill-defined left lower lung zone air space opacities. No effusion or pneumothorax. New endotracheal tube tip projects in the mid thoracic trachea. No new abnormality.    Impression     New endotracheal tube tip projects in the mid thoracic trachea. No new abnormality.      Electronically signed by: MELYSSA TIWARI MD  Date:     07/03/17  Time:    18:24        Past Medical History:   Diagnosis Date    Aortic stenosis     BPH (benign prostatic hypertrophy)     HEARING LOSS     uses hearing aids    Hypertension     Hypothyroidism     Localization-related (focal) (partial) epilepsy and epileptic syndromes with complex partial seizures, without mention of intractable epilepsy     Memory loss     Other and unspecified hyperlipidemia     Scoliosis (and kyphoscoliosis), idiopathic     Seizures     Stroke      Past Surgical History:   Procedure Laterality Date    AORTIC VALVE REPLACEMENT  August 2011    CARDIAC CATHETERIZATION  6/11    mild non-obstructive CAD    CARDIAC VALVE SURGERY  8/11    23 mm procine AVR    CATARACT EXTRACTION, BILATERAL      CHOLECYSTECTOMY      HEMORRHOID SURGERY         Has the patient been evaluated by SLP for swallowing? : Yes  Keep patient NPO?: No   General Precautions: Standard,               Prior diet: regular/thin; independent.      Subjective:  Pt awake and cooperative. Communicated with RN prior to entry. RN reported no noted difficulties with swallowing.   Patient goals: none stated.    Pain/Comfort  Pain Rating 1: 0/10    Objective:      Oral Musculature Evaluation  Oral Musculature: WFL  Dentition: present and adequate  Mucosal Quality: adequate  Mandibular Strength and Mobility: WFL  Oral Labial Strength and Mobility: WFL  Lingual Strength and Mobility: WFL  Velar Elevation: WFL  Buccal Strength and Mobility: impaired, decreased tone  Volitional Cough: elicited  Volitional Swallow: timely  Voice Prior to PO Intake: clear     Bedside Swallow Eval:  Oral Phase: WFL  Pharyngeal Phase: no overt clinical  signs/symptoms of aspiration and no overt clinical signs/symptoms of pharyngeal dysphagia  Pt seen for a bedside swallow study. Pt presents with no overt s/s of aspiration, penetration, or dysphagia.   Assessed with:  THIN:- ice chip x1; self regulated sip of water via cup and straw x2 each  PUREE:- tsp bite of apple sauce x2  SOLID: Waqar cracker bite x2 with liquid wash  No further skilled ST services warranted at this time. Please re-consult as needed.       Assessment:  Virgil Melo Jr. is a 88 y.o. male with a medical diagnosis of Acute saddle pulmonary embolism and presents with no overt s/s of aspiration per subjective bedside assessment. No further skilled ST services warranted at this time. Please reconsult as needed.           Discharge recommendations: Discharge Facility/Level Of Care Needs:  (No ST needs)     Goals:    SLP Goals     Not on file          Multidisciplinary Problems (Resolved)        Problem: SLP Goal    Goal Priority Disciplines Outcome   SLP Goal   (Resolved)     SLP Outcome(s) achieved   Description:  Short Term Goals:   1. Pt will participate in a bedside swallow study to determine the safest and least restrictive possible po diet with possible updated goals  to follow pending results. -MET                       Plan:   Patient to be seen     Plan of Care expires:    Plan of Care reviewed with: patient  SLP Follow-up?: No          MICHAEL Abdalla, CCC-SLP  07/04/2017

## 2017-07-04 NOTE — SUBJECTIVE & OBJECTIVE
Principal Problem:Acute saddle pulmonary embolism    Principal Orthopedic Problem: R intertroch femur fracture    Interval History: AFVSS. DANIION. Pain controlled on current regimen. Will work with PT/OT today.    Review of patient's allergies indicates:   Allergen Reactions    Penicillins Hives     Other reaction(s): Hives       Current Facility-Administered Medications   Medication    0.9%  NaCl infusion (for blood administration)    acetaminophen tablet 650 mg    albuterol-ipratropium 2.5mg-0.5mg/3mL nebulizer solution 3 mL    atorvastatin tablet 40 mg    calcium-vitamin D3 500 mg(1,250mg) -200 unit per tablet 1 tablet    carvedilol tablet 6.25 mg    clindamycin 900 MG/50 ML D5W 900 mg/50 mL IVPB 900 mg    gabapentin capsule 600 mg    heparin 25,000 units in dextrose 5% (100 units/ml) IV bolus from bag    heparin 25,000 units in dextrose 5% 250 mL (100 units/mL) bolus from bag; ADDITIONAL PRN BOLUS    heparin 25,000 units in dextrose 5% 250 mL (100 units/mL) infusion    hydrALAZINE injection 10 mg    levetiracetam tablet 750 mg    magnesium oxide tablet 800 mg    magnesium oxide tablet 800 mg    memantine tablet 10 mg    morphine injection 4 mg    niCARdipine 40 mg/200 mL infusion    ondansetron injection 4 mg    oxycodone immediate release tablet 5 mg    phenobarbital tablet 30 mg    potassium chloride 10% solution 40 mEq    potassium chloride 10% solution 40 mEq    potassium chloride 10% solution 60 mEq    potassium, sodium phosphates 280-160-250 mg packet 2 packet    potassium, sodium phosphates 280-160-250 mg packet 2 packet    potassium, sodium phosphates 280-160-250 mg packet 2 packet    ropivacaine (PF) 2 mg/ml (0.2%) infusion    senna-docusate 8.6-50 mg per tablet 1 tablet     Objective:     Vital Signs (Most Recent):  Temp: 98 °F (36.7 °C) (07/03/17 2300)  Pulse: 78 (07/04/17 0600)  Resp: 19 (07/04/17 0600)  BP: (!) 190/99 (07/03/17 1930)  SpO2: (!) 94 % (07/04/17 0600) Vital  Signs (24h Range):  Temp:  [97.9 °F (36.6 °C)-98.3 °F (36.8 °C)] 98 °F (36.7 °C)  Pulse:  [60-89] 78  Resp:  [14-31] 19  SpO2:  [90 %-100 %] 94 %  BP: (116-207)/() 190/99  Arterial Line BP: (154-228)/() 176/79     Weight: 91.6 kg (202 lb)  Height: 6' (182.9 cm)  Body mass index is 27.4 kg/m².      Intake/Output Summary (Last 24 hours) at 07/04/17 0722  Last data filed at 07/04/17 0600   Gross per 24 hour   Intake          1021.61 ml   Output             1510 ml   Net          -488.39 ml       Ortho/SPM Exam    Significant Labs:   BMP:   Recent Labs  Lab 07/04/17  0345   *  146*     138   K 4.3  4.3     109   CO2 19*  19*   BUN 23  23   CREATININE 0.9  0.9   CALCIUM 7.9*  7.9*   MG 2.4     CBC:   Recent Labs  Lab 07/02/17  1120 07/03/17  0209 07/04/17  0345   WBC 12.88* 10.96 9.07  9.07   HGB 12.8* 10.9* 10.5*  10.5*   HCT 39.5* 33.9* 32.7*  32.7*   * 127* 131*  131*       Significant Imaging: I have reviewed all pertinent imaging results/findings.

## 2017-07-04 NOTE — SUBJECTIVE & OBJECTIVE
Interval History/Significant Events: POD #1 s/p IM nailing of Right Femur and s/p IVC filter- Was extubated now on high flow O2. Had some hypertension that was initially treated with nicardipine last night, but it returned to baseline.         Review of Systems   Constitutional: Negative for chills, diaphoresis, fatigue and fever.   Respiratory: Negative for cough, choking, chest tightness and shortness of breath.    Cardiovascular: Negative for chest pain, palpitations and leg swelling.   Gastrointestinal: Negative for abdominal distention, abdominal pain, nausea and vomiting.        Surgical site right groin C/D/I with drain in place   Genitourinary: Negative for difficulty urinating, dysuria, flank pain and hematuria.     Objective:     Vital Signs (Most Recent):  Temp: 98.7 °F (37.1 °C) (07/04/17 0700)  Pulse: 81 (07/04/17 0700)  Resp: 19 (07/04/17 0700)  BP: (!) 155/87 (07/04/17 0700)  SpO2: 95 % (07/04/17 0700) Vital Signs (24h Range):  Temp:  [97.9 °F (36.6 °C)-98.7 °F (37.1 °C)] 98.7 °F (37.1 °C)  Pulse:  [60-89] 81  Resp:  [14-31] 19  SpO2:  [90 %-100 %] 95 %  BP: (125-207)/() 155/87  Arterial Line BP: (154-228)/() 187/88   Weight: 91.6 kg (202 lb)  Body mass index is 27.4 kg/m².      Intake/Output Summary (Last 24 hours) at 07/04/17 0823  Last data filed at 07/04/17 0700   Gross per 24 hour   Intake          1024.04 ml   Output             1595 ml   Net          -570.96 ml       Physical Exam   Constitutional: He appears well-developed and well-nourished.   HENT:   Head: Normocephalic.   Eyes: EOM are normal. Pupils are equal, round, and reactive to light.   Neck: Normal range of motion.   Cardiovascular: Normal rate, regular rhythm, normal heart sounds and intact distal pulses.    Systolic ejection murmur left sternal border.      Pulmonary/Chest: Effort normal and breath sounds normal. No respiratory distress. He has no wheezes. He has no rales.   High flow oxygen with nasal cannula      Abdominal: Soft. Bowel sounds are normal.   Neurological: He is alert.   Skin: Skin is warm and dry.       Vents:  Vent Mode: A/C (07/03/17 1718)  Ventilator Initiated: Yes (07/03/17 1608)  Set Rate: 16 bmp (07/03/17 1718)  Vt Set: 450 mL (07/03/17 1718)  PEEP/CPAP: 8 cmH20 (07/03/17 1718)  Oxygen Concentration (%): 100 (07/03/17 2316)  Peak Airway Pressure: 24 cmH2O (07/03/17 1718)  Total Ve: 11.8 mL (07/03/17 1718)  F/VT Ratio<105 (RSBI): (!) 53.08 (07/03/17 1608)  Lines/Drains/Airways     Drain                 Urethral Catheter 07/01/17 1400 2 days          Epidural Line                 Perineural Analgesia/Anesthesia Assessment (using dermatomes) Epidural 07/03/17 1123 less than 1 day          Arterial Line                 Arterial Line 07/03/17 1030 Left Radial less than 1 day          Pressure Ulcer                 Pressure Ulcer 07/01/17 2020 midline buttocks suspected deep tissue injury 2 days          Peripheral Intravenous Line                 Peripheral IV - Single Lumen 07/01/17 1400 Right Antecubital 2 days         Peripheral IV - Single Lumen 07/02/17 0105 Right Wrist 2 days         Peripheral IV - Single Lumen 07/03/17 1400 Right Hand less than 1 day              Significant Labs:    CBC/Anemia Profile:    Recent Labs  Lab 07/03/17  0209 07/04/17  0345 07/04/17  0754   WBC 10.96 9.07  9.07 9.11   HGB 10.9* 10.5*  10.5* 10.8*   HCT 33.9* 32.7*  32.7* 32.6*   * 131*  131* 132*   MCV 94 94  94 93   RDW 14.4 14.1  14.1 14.2        Chemistries:    Recent Labs  Lab 07/03/17  0209 07/04/17  0345    138  138   K 3.5 4.3  4.3   * 109  109   CO2 19* 19*  19*   BUN 39* 23  23   CREATININE 1.2 0.9  0.9   CALCIUM 7.9* 7.9*  7.9*   ALBUMIN 2.7* 2.7*   PROT 6.1 6.5   BILITOT 0.5 0.6   ALKPHOS 86 95   ALT 20 24   AST 28 29   MG 2.2 2.4   PHOS 3.3 3.2       ABGs:   Recent Labs  Lab 07/03/17  1642   PH 7.293*   PCO2 38.7   HCO3 18.8*   POCSATURATED 91*   BE -8     BMP:   Recent  Labs  Lab 07/04/17  0345   *  146*     138   K 4.3  4.3     109   CO2 19*  19*   BUN 23  23   CREATININE 0.9  0.9   CALCIUM 7.9*  7.9*   MG 2.4     All pertinent labs within the past 24 hours have been reviewed.

## 2017-07-04 NOTE — PROGRESS NOTES
[facility]  Critical Care Medicine  Progress Note    Patient Name: Virgil Melo Jr.  MRN: [id number]  Admission Date: 7/1/2017  Hospital Length of Stay: 3 days  Code Status: Partial Code  Attending Provider: Surekha Puentes MD  Primary Care Provider: Luis A Milton MD   Principal Problem: Acute saddle pulmonary embolism    Subjective:     HPI:  88 year old man with PMH of AS s/p AVR, Afib, HTN, HLD, presents hypoxic respiratory failure.     Patient found on floor of assisted living facility night of 6/30/17. Fall was not witnessed. Patient does not remember fall. CT head at OSH negative. Transferred to [facility] for evaluation and operative fixation R intertrochanteric fracture.     Patient was desaturating to 76% on 5L nasal cannula, with afib in 90's, and BP systolic>110.He was then placed on nonrebreather and sat 91% at 1209 am.     No chest pain.     Daughter is medical decision maker.      Hospital/ICU Course:  S/p IVC filter placement (7/3/2017) and intramedullary nailing of right femur  (7/3/2017)    Interval History/Significant Events: POD #1 s/p IM nailing of Right Femur and s/p IVC filter- Was extubated now on high flow O2. Had some hypertension that was initially treated with nicardipine last night, but it returned to baseline.         Review of Systems   Constitutional: Negative for chills, diaphoresis, fatigue and fever.   Respiratory: Negative for cough, choking, chest tightness and shortness of breath.    Cardiovascular: Negative for chest pain, palpitations and leg swelling.   Gastrointestinal: Negative for abdominal distention, abdominal pain, nausea and vomiting.        Surgical site right groin C/D/I with drain in place   Genitourinary: Negative for difficulty urinating, dysuria, flank pain and hematuria.     Objective:     Vital Signs (Most Recent):  Temp: 98.7 °F (37.1 °C) (07/04/17 0700)  Pulse: 81 (07/04/17 0700)  Resp: 19 (07/04/17 0700)  BP: (!) 155/87 (07/04/17 0700)  SpO2:  95 % (07/04/17 0700) Vital Signs (24h Range):  Temp:  [97.9 °F (36.6 °C)-98.7 °F (37.1 °C)] 98.7 °F (37.1 °C)  Pulse:  [60-89] 81  Resp:  [14-31] 19  SpO2:  [90 %-100 %] 95 %  BP: (125-207)/() 155/87  Arterial Line BP: (154-228)/() 187/88   Weight: 91.6 kg (202 lb)  Body mass index is 27.4 kg/m².      Intake/Output Summary (Last 24 hours) at 07/04/17 0823  Last data filed at 07/04/17 0700   Gross per 24 hour   Intake          1024.04 ml   Output             1595 ml   Net          -570.96 ml       Physical Exam   Constitutional: He appears well-developed and well-nourished.   HENT:   Head: Normocephalic.   Eyes: EOM are normal. Pupils are equal, round, and reactive to light.   Neck: Normal range of motion.   Cardiovascular: Normal rate, regular rhythm, normal heart sounds and intact distal pulses.    Systolic ejection murmur left sternal border.      Pulmonary/Chest: Effort normal and breath sounds normal. No respiratory distress. He has no wheezes. He has no rales.   High flow oxygen with nasal cannula     Abdominal: Soft. Bowel sounds are normal.   Neurological: He is alert.   Skin: Skin is warm and dry.       Vents:  Vent Mode: A/C (07/03/17 1718)  Ventilator Initiated: Yes (07/03/17 1608)  Set Rate: 16 bmp (07/03/17 1718)  Vt Set: 450 mL (07/03/17 1718)  PEEP/CPAP: 8 cmH20 (07/03/17 1718)  Oxygen Concentration (%): 100 (07/03/17 2316)  Peak Airway Pressure: 24 cmH2O (07/03/17 1718)  Total Ve: 11.8 mL (07/03/17 1718)  F/VT Ratio<105 (RSBI): (!) 53.08 (07/03/17 1608)  Lines/Drains/Airways     Drain                 Urethral Catheter 07/01/17 1400 2 days          Epidural Line                 Perineural Analgesia/Anesthesia Assessment (using dermatomes) Epidural 07/03/17 1123 less than 1 day          Arterial Line                 Arterial Line 07/03/17 1030 Left Radial less than 1 day          Pressure Ulcer                 Pressure Ulcer 07/01/17 2020 midline buttocks suspected deep tissue injury 2 days           Peripheral Intravenous Line                 Peripheral IV - Single Lumen 07/01/17 1400 Right Antecubital 2 days         Peripheral IV - Single Lumen 07/02/17 0105 Right Wrist 2 days         Peripheral IV - Single Lumen 07/03/17 1400 Right Hand less than 1 day              Significant Labs:    CBC/Anemia Profile:    Recent Labs  Lab 07/03/17  0209 07/04/17  0345 07/04/17  0754   WBC 10.96 9.07  9.07 9.11   HGB 10.9* 10.5*  10.5* 10.8*   HCT 33.9* 32.7*  32.7* 32.6*   * 131*  131* 132*   MCV 94 94  94 93   RDW 14.4 14.1  14.1 14.2        Chemistries:    Recent Labs  Lab 07/03/17  0209 07/04/17  0345    138  138   K 3.5 4.3  4.3   * 109  109   CO2 19* 19*  19*   BUN 39* 23  23   CREATININE 1.2 0.9  0.9   CALCIUM 7.9* 7.9*  7.9*   ALBUMIN 2.7* 2.7*   PROT 6.1 6.5   BILITOT 0.5 0.6   ALKPHOS 86 95   ALT 20 24   AST 28 29   MG 2.2 2.4   PHOS 3.3 3.2       ABGs:   Recent Labs  Lab 07/03/17  1642   PH 7.293*   PCO2 38.7   HCO3 18.8*   POCSATURATED 91*   BE -8     BMP:   Recent Labs  Lab 07/04/17  0345   *  146*     138   K 4.3  4.3     109   CO2 19*  19*   BUN 23  23   CREATININE 0.9  0.9   CALCIUM 7.9*  7.9*   MG 2.4     All pertinent labs within the past 24 hours have been reviewed.          Assessment/Plan:     Neuro   Dementia, vascular    Mental status is stable, continue home medications.         Partial epilepsy with impairment of consciousness    Continue Keppra        Pulmonary   * Acute saddle pulmonary embolism    Large saddle pulmonary embolus with right heart strain found on CTA causing hypoxemic respiratory failure. Likely provoked from recent R hip fracture.   POD1 s/p IVC filter placement.   Now extubated and sat 90%i on high flow 5L O2.     Continue heparin for anticoagulation.     Blood pressure is elevated 190's, Schedule Coreg 12.5 BID  Ready for step down to floor.             Cardiac   Atrial fibrillation    -New onset likely from  acute strain from saddle embolus  -On heparin drip  -Rate controlled and hemodynamically stable        Hypertension    -Patient with history of hypertension, but concerned about BP in 180's.   -Scheduled Coreg 12.5 BID        Endocrine   Hypothyroid    Continue synthroid.        Musculoskeletal and Integument   Closed intertrochanteric fracture of right femur s/p IMN on 7/3/17    POD1 s/p IM nailing Right Femur   incision C/D/I  Continuous infusion of nopivicaine and not complaining of pain.   Discontinue nopivicaine??---  Post op clindamycin.                 Critical secondary to Patient has a condition that poses threat to life and bodily function: Pulmonary Embolism      Critical care was time spent personally by me on the following activities: development of treatment plan with patient or surrogate and bedside caregivers, discussions with consultants, evaluation of patient's response to treatment, examination of patient, ordering and performing treatments and interventions, ordering and review of laboratory studies, ordering and review of radiographic studies, pulse oximetry, re-evaluation of patient's condition. This critical care time did not overlap with that of any other provider or involve time for any procedures.     Juan Grijalva MD  Critical Care Medicine  [facility]

## 2017-07-04 NOTE — PROGRESS NOTES
[facility]  Orthopedics  Progress Note    Patient Name: Virgil Melo Jr.  MRN: [id number]  Admission Date: 7/1/2017  Hospital Length of Stay: 3 days  Attending Provider: Surekha Puentes MD  Primary Care Provider: Luis A Milton MD  Follow-up For: Procedure(s) (LRB):  IM NAILING OF FEMUR - RIGHT (Right)    Post-Operative Day: 1 Day Post-Op  Subjective:     Principal Problem:Acute saddle pulmonary embolism    Principal Orthopedic Problem: R intertroch femur fracture    Interval History: AFVSS. NAEON. Pain controlled on current regimen. Will work with PT/OT today.    Review of patient's allergies indicates:   Allergen Reactions    Penicillins Hives     Other reaction(s): Hives       Current Facility-Administered Medications   Medication    0.9%  NaCl infusion (for blood administration)    acetaminophen tablet 650 mg    albuterol-ipratropium 2.5mg-0.5mg/3mL nebulizer solution 3 mL    atorvastatin tablet 40 mg    calcium-vitamin D3 500 mg(1,250mg) -200 unit per tablet 1 tablet    carvedilol tablet 6.25 mg    clindamycin 900 MG/50 ML D5W 900 mg/50 mL IVPB 900 mg    gabapentin capsule 600 mg    heparin 25,000 units in dextrose 5% (100 units/ml) IV bolus from bag    heparin 25,000 units in dextrose 5% 250 mL (100 units/mL) bolus from bag; ADDITIONAL PRN BOLUS    heparin 25,000 units in dextrose 5% 250 mL (100 units/mL) infusion    hydrALAZINE injection 10 mg    levetiracetam tablet 750 mg    magnesium oxide tablet 800 mg    magnesium oxide tablet 800 mg    memantine tablet 10 mg    morphine injection 4 mg    niCARdipine 40 mg/200 mL infusion    ondansetron injection 4 mg    oxycodone immediate release tablet 5 mg    phenobarbital tablet 30 mg    potassium chloride 10% solution 40 mEq    potassium chloride 10% solution 40 mEq    potassium chloride 10% solution 60 mEq    potassium, sodium phosphates 280-160-250 mg packet 2 packet    potassium, sodium phosphates 280-160-250 mg  packet 2 packet    potassium, sodium phosphates 280-160-250 mg packet 2 packet    ropivacaine (PF) 2 mg/ml (0.2%) infusion    senna-docusate 8.6-50 mg per tablet 1 tablet     Objective:     Vital Signs (Most Recent):  Temp: 98 °F (36.7 °C) (07/03/17 2300)  Pulse: 78 (07/04/17 0600)  Resp: 19 (07/04/17 0600)  BP: (!) 190/99 (07/03/17 1930)  SpO2: (!) 94 % (07/04/17 0600) Vital Signs (24h Range):  Temp:  [97.9 °F (36.6 °C)-98.3 °F (36.8 °C)] 98 °F (36.7 °C)  Pulse:  [60-89] 78  Resp:  [14-31] 19  SpO2:  [90 %-100 %] 94 %  BP: (116-207)/() 190/99  Arterial Line BP: (154-228)/() 176/79     Weight: 91.6 kg (202 lb)  Height: 6' (182.9 cm)  Body mass index is 27.4 kg/m².      Intake/Output Summary (Last 24 hours) at 07/04/17 0722  Last data filed at 07/04/17 0600   Gross per 24 hour   Intake          1021.61 ml   Output             1510 ml   Net          -488.39 ml       Ortho/SPM Exam  HEENT: normocephalic, atraumatic  Resp: no increased work of breathing, extubated  CV: regular rate and rhythm  MSK: moves all extremities well  RLE: dressings c/d/i, NVI    Significant Labs:   BMP:   Recent Labs  Lab 07/04/17  0345   *  146*     138   K 4.3  4.3     109   CO2 19*  19*   BUN 23  23   CREATININE 0.9  0.9   CALCIUM 7.9*  7.9*   MG 2.4     CBC:   Recent Labs  Lab 07/02/17  1120 07/03/17  0209 07/04/17  0345   WBC 12.88* 10.96 9.07  9.07   HGB 12.8* 10.9* 10.5*  10.5*   HCT 39.5* 33.9* 32.7*  32.7*   * 127* 131*  131*       Significant Imaging: I have reviewed all pertinent imaging results/findings.    Assessment/Plan:     Closed intertrochanteric fracture of right femur s/p IMN on 7/3/17    - Antibiotics: post-op Clinda  - Weight bearing status: WBAT RLE  - Labs: reviewed  - DVT Prophylaxis: anticoagulated with heparin gtt  - Lines/Drains: PIV, barton  - Pain control: PNC per anesthesia acute pain service                Ashley Brown MD  Orthopedics  [facility]

## 2017-07-05 PROBLEM — I48.0 PAROXYSMAL ATRIAL FIBRILLATION: Status: ACTIVE | Noted: 2017-01-01

## 2017-07-05 PROBLEM — S72.009D ENCOUNTER FOR AFTERCARE FOR HEALING CLOSED TRAUMATIC FRACTURE OF HIP: Status: ACTIVE | Noted: 2017-01-01

## 2017-07-05 PROBLEM — R41.3 MEMORY LOSS: Status: RESOLVED | Noted: 2017-01-01 | Resolved: 2017-01-01

## 2017-07-05 PROBLEM — S72.143A CLOSED INTERTROCHANTERIC FRACTURE OF FEMUR: Status: RESOLVED | Noted: 2017-01-01 | Resolved: 2017-01-01

## 2017-07-05 PROBLEM — S72.91XA FEMUR FRACTURE, RIGHT: Status: RESOLVED | Noted: 2017-01-01 | Resolved: 2017-01-01

## 2017-07-05 PROBLEM — S72.143A CLOSED INTERTROCHANTERIC FRACTURE OF FEMUR: Status: ACTIVE | Noted: 2017-01-01

## 2017-07-05 NOTE — ANESTHESIA POST-OP PAIN MANAGEMENT
Acute Pain Service Progress Note    Virgil Melo Jr. is a 88 y.o., male, [id number] with PMHx of HTN, HLD, PVD, BPH, hypothyroidism, a fib, AS (s/p AVR), partial epilepsy, PE (s/p IVC filter placement), and OA who is s/p IM nailing of R femur 7/3/17    Surgery:  IM Nailing of Femur- Right    Post Op Day #: 2    Catheter type: perineural  Suprainguinal Fascia Iliaca     Infusion type: Ropivacaine 0.2%  8 mL/hr     Problem List:    Active Hospital Problems    Diagnosis  POA    *Acute saddle pulmonary embolism [I26.92]  Yes    Paroxysmal atrial fibrillation [I48.0]  Yes    Unsteady gait [R26.81]  Yes    Acute respiratory failure with hypoxia [J96.01]  Yes    Closed intertrochanteric fracture of right femur s/p IMN on 7/3/17 [S72.141A]  Yes    Acquired hypothyroidism [E03.9]  Yes    Osteoporosis, senile [M81.0]  Yes    Vascular dementia without behavioral disturbance [F01.50]  Yes    Essential hypertension [I10]  Yes    Partial symptomatic epilepsy with complex partial seizures, not intractable, without status epilepticus [G40.209]  Yes      Resolved Hospital Problems    Diagnosis Date Resolved POA    Femur fracture, right [S72.91XA] 07/05/2017 Yes    Tachycardia [R00.0] 07/05/2017 Yes       Subjective:     General appearance of alert, oriented, no complaints   Pain with rest: 1    Numbers   Pain with movement: 1    Numbers   Side Effects    1. Pruritis No    2. Nausea No    3. Motor Blockade No, 0=Ability to raise lower extremities off bed    4. Sedation No, 1=awake and alert    Objective:     Catheter site clean, dry, intact        Vitals   Vitals:    07/05/17 0718   BP: (!) 163/91   Pulse:    Resp:    Temp:         Labs    Admission on 07/01/2017   No results displayed because visit has over 200 results.           Meds   Current Facility-Administered Medications   Medication Dose Route Frequency Provider Last Rate Last Dose    0.9%  NaCl infusion (for blood administration)   Intravenous Q24H PRN Neo  Joseph Casale, MD        acetaminophen tablet 650 mg  650 mg Oral Q6H PRN Arup K. Trista, DO        albuterol-ipratropium 2.5mg-0.5mg/3mL nebulizer solution 3 mL  3 mL Nebulization Q4H PRN Arup K. Trista, DO        atorvastatin tablet 40 mg  40 mg Oral Daily Arup K. Trista, DO   40 mg at 07/04/17 0846    calcium-vitamin D3 500 mg(1,250mg) -200 unit per tablet 1 tablet  1 tablet Oral BID Arup K. Trista, DO   1 tablet at 07/04/17 2008    carvedilol tablet 12.5 mg  12.5 mg Oral BID Tori Morocho MD   12.5 mg at 07/04/17 2003    gabapentin capsule 600 mg  600 mg Oral TID Arup K. Trista, DO   600 mg at 07/05/17 0517    heparin 25,000 units in dextrose 5% (100 units/ml) IV bolus from bag  15 Units/kg (Adjusted) Intravenous PRN PROSPER Bai        heparin 25,000 units in dextrose 5% 250 mL (100 units/mL) bolus from bag; ADDITIONAL PRN BOLUS  30 Units/kg (Adjusted) Intravenous PRN PROSPER Bai   30 Units at 07/05/17 0125    heparin 25,000 units in dextrose 5% 250 mL (100 units/mL) infusion  17 Units/kg/hr (Adjusted) Intravenous Continuous PROSPER Bai 17.5 mL/hr at 07/05/17 0126 21 Units/kg/hr at 07/05/17 0126    hydrALAZINE tablet 25 mg  25 mg Oral Q6H PRN Hanane Simpson MD   25 mg at 07/05/17 0602    levetiracetam tablet 750 mg  750 mg Oral BID Arup KElijah Trista, DO   750 mg at 07/04/17 2003    magnesium oxide tablet 800 mg  800 mg Oral PRN Marbella Young MD        magnesium oxide tablet 800 mg  800 mg Oral PRN Marbella Young MD        memantine tablet 10 mg  10 mg Oral Daily Arup K. Trista, DO   10 mg at 07/04/17 0847    morphine injection 4 mg  4 mg Intravenous Q4H PRN Arup KElijah Trista, DO   4 mg at 07/01/17 2136    ondansetron injection 4 mg  4 mg Intravenous Q6H PRN Ramírez Weston, DO        oxycodone immediate release tablet 5 mg  5 mg Oral Q4H PRN Ramírez Weston, DO   5 mg at 07/02/17 0724    phenobarbital tablet 30 mg  30 mg Oral TID Ramírez Weston, DO   30 mg at  07/05/17 0517    potassium chloride 10% solution 40 mEq  40 mEq Oral PRN Marbella Young MD   40 mEq at 07/03/17 0520    potassium chloride 10% solution 40 mEq  40 mEq Oral PRN Marbella Young MD        potassium chloride 10% solution 60 mEq  60 mEq Oral PRN Marbella Young MD        potassium, sodium phosphates 280-160-250 mg packet 2 packet  2 packet Oral PRN Marbella Young MD        potassium, sodium phosphates 280-160-250 mg packet 2 packet  2 packet Oral PRN Marbella Young MD        potassium, sodium phosphates 280-160-250 mg packet 2 packet  2 packet Oral PRN Marbella Young MD        ropivacaine (PF) 2 mg/ml (0.2%) infusion  8 mL/hr Perineural Continuous Obinna Lee MD 8 mL/hr at 07/04/17 1952 8 mL/hr at 07/04/17 1952    senna-docusate 8.6-50 mg per tablet 1 tablet  1 tablet Oral BID PRN Kimberley Del Real MD             Assessment:  Virgil Melo Jr. is a 88 y.o., male, [id number] with PMHx of HTN, HLD, PVD, BPH, hypothyroidism, a fib, AS (s/p AVR), partial epilepsy, PE (s/p IVC filter placement), and OA who is s/p IM nailing of R femur 7/3/17.     Pain control adequate    Plan:    Patient doing well, continue present treatment. Pt pain well controlled with infusion catheter and PRN Oxycodone. Will cont current management and f/u with primary team regarding discharge planning.    Evaluator Maria Teresa Nevarez          I have seen the patient, reviewed the Resident's assessment and plan. I have personally interviewed and examined the patient at bedside and: agree with the findings.   Will continue PNC as pain well controlled and has minimal narcotic usage

## 2017-07-05 NOTE — SUBJECTIVE & OBJECTIVE
Interval History: Patient transferred from MICU to floor overnight. Patient denies any current chest pain or SOB. Patient reports minimal pain to right hip. Patient states he does not remember being in hospital and did not remember he had surgery or that he had broken his hip.     Review of Systems   Constitutional: Negative for chills and fever.   HENT: Negative for congestion.    Respiratory: Negative for cough and shortness of breath.    Cardiovascular: Negative for chest pain and palpitations.   Gastrointestinal: Negative for abdominal pain, diarrhea, nausea and vomiting.   Genitourinary: Negative for dysuria.   Musculoskeletal: Positive for myalgias (Right hip pain). Negative for back pain.   Skin: Negative for rash.   Neurological: Negative for dizziness and light-headedness.   Psychiatric/Behavioral: Positive for confusion. Negative for hallucinations.     Objective:     Vital Signs (Most Recent):  Temp: 98.4 °F (36.9 °C) (07/05/17 1608)  Pulse: 76 (07/05/17 1608)  Resp: 20 (07/05/17 1608)  BP: (!) 177/82 (07/05/17 1608)  SpO2: (!) 92 % (07/05/17 1608) Vital Signs (24h Range):  Temp:  [97.8 °F (36.6 °C)-99.8 °F (37.7 °C)] 98.4 °F (36.9 °C)  Pulse:  [70-82] 76  Resp:  [18-22] 20  SpO2:  [86 %-93 %] 92 %  BP: (147-186)/() 177/82     Weight: 91.6 kg (202 lb)  Body mass index is 27.4 kg/m².    Intake/Output Summary (Last 24 hours) at 07/05/17 3709  Last data filed at 07/05/17 1500   Gross per 24 hour   Intake              820 ml   Output             1325 ml   Net             -505 ml      Physical Exam   Constitutional: He appears well-developed and well-nourished. No distress.   HENT:   Mouth/Throat: Oropharynx is clear and moist.   Neck: Neck supple. No JVD present.   Cardiovascular: Normal rate, regular rhythm and normal heart sounds.  Exam reveals no gallop and no friction rub.    No murmur heard.  Pulmonary/Chest: Effort normal and breath sounds normal. He has no wheezes. He has no rales.   Abdominal:  Soft. Bowel sounds are normal. He exhibits no distension. There is no tenderness.   Musculoskeletal: He exhibits no edema.   Neurological: He is alert.   Oriented to person only   Skin: No rash noted. No erythema.   Surgical bandage in place to right hip   Psychiatric: He has a normal mood and affect. His behavior is normal.       Significant Labs:   BMP:   Recent Labs  Lab 07/05/17  0409   *      K 3.6      CO2 20*   BUN 21   CREATININE 0.9   CALCIUM 8.4*   MG 1.9     CBC:   Recent Labs  Lab 07/04/17  0754 07/04/17  1752 07/05/17  0757   WBC 9.11 10.49 10.61   HGB 10.8* 10.6* 11.5*   HCT 32.6* 32.7* 34.7*   * 130* 144*     Coagulation:   Recent Labs  Lab 07/04/17  0345   APTT 82.7*     Significant Imaging: I have reviewed all pertinent imaging results/findings within the past 24 hours.

## 2017-07-05 NOTE — NURSING TRANSFER
Nursing Transfer Note      7/5/2017     Transfer From: [facility] to     Transfer via stretcher    Transfer with  to O2, cardiac monitoring    Transported by RN and PCT    Medicines sent: YES    Chart send with patient: Yes    Notified: daughter    Patient reassessed at: Upon arrival to the room.    Upon arrival to floor: cardiac monitor applied, patient oriented to room, call bell in reach and bed in lowest position

## 2017-07-05 NOTE — HPI
88 M with a PMH of Epilepsy since childhood ( on keppra, phenobarbital and gabapentin and followed by neurology at [facility] [place] ), dementia,  HTN, Hypothyroidism, aortic stenosis  S/P bioprosthetic AV replacement more than ten years ago presents as a transfer from [facility] for further evaluation and management of right hip fracture. Patient is a poor informant given h/o dementia. Daughter at bedside provided HPI. Patient lives at assisted living facility and ambulates with cane due to unsteady gait. He had an unwitnessed fall inside his room Friday evening (06/30 ) around 6 pm while taking off his pants. Daughter states she thinks patient might have lost balance while undressing and fell to the floor on his buttock. Nursing staff found patient on the floor on his buttock and complaining of right hip pain, notified daughter and sent him to [facility] via EMS. At [facility] imaging including CT pelvis showed intertrochanteric right hip fracture and new onset Afib. Patient's insurance not accepted by [facility] and thus transferred to [facility]. Daughter states since her mother passed away couple of years ago she has been managing his medications and he has been taking only the following meds : keppra, phenobarbital, gabapentin and namenda.      During my interview patient is awake, but confused. Oriented to person only and complaining of right hip pain. Morphine 4 mg IV x 1 given with improved pain. O2 sat in mid 80s on 5 liter N/C. Elevated troponin 0.248, no chest pain, STAT EKG showed afib with RVR ( HR ~ 110 ). HR improved to 80s after lopressor 5 mg IVP. O2 sat improved to low 90s on NRB mask. Cardiology was consulted for pre op evaluation given elevated troponin, afib W RVR and hypoxia. STAT CTA chest showed extensive pulmonary saddle embolus, involving all lobes with associated right heart strain. CT head negative for bleeding. CCS was consulted and patient was transferred to ICU after CTA showed saddle embolus for close monitoring of  hemodynamic/respiratory status.

## 2017-07-05 NOTE — PLAN OF CARE
Problem: Occupational Therapy Goal  Goal: Occupational Therapy Goal  Goals to be met by: 7/19/17     Patient will increase functional independence with ADLs by performing:    UE Dressing with Moderate Assistance.  LE Dressing with Moderate Assistance.  Grooming while seated with Minimal Assistance.  Toileting from bedside commode with Moderate Assistance for hygiene and clothing management.   Bathing from  edge of bed with Moderate Assistance.  Toilet transfer to bedside commode with Moderate Assistance.  Increased functional strength to WFL for B UE.  Upper extremity exercise program x15 reps per handout, with assistance as needed.    POC initiated.

## 2017-07-05 NOTE — PLAN OF CARE
Problem: Patient Care Overview  Goal: Plan of Care Review  Outcome: Ongoing (interventions implemented as appropriate)  Pt transferred to . Pt hypertensive. PRN Hydralazine administered. Denies pain. Pt oriented only to self. Continuous Heparin infusing per MD. Anti Xa came back low. Pt received a bolus and increased rate of drip.Reordered anti xa 6 hours from change.  Continuous Naropin infusing per MD rate. Plan of care reviewed with pt multiple times. Will continue to monitor.

## 2017-07-05 NOTE — HOSPITAL COURSE
Patient was admitted on 7/1/2017 with acute right intertrochanteric hip fracture and Orthopedics recommended operative repair. During preop evaluation for surgical hip fixation, patient was noted to be desaturating to 76% on 5L nasal cannula with atrial fibrillation in 90's and SBP systolic>110. He was then placed on nonrebreather and sat 91% and admitted to medical ICU. Patient had CTA of chest done that showed submassive acute pulmonary embolus that was present at time of admission. Operative repair of right fracture delayed until oxygenation was stabilized in light of acute PE and patient placed on IV Heparin. Patient was able to be weaned to high flow oxygen prior to surgical procedure on 7/3. Patient had IVC filter placed by IR on 7/3 as had to stop IV Heparin for operative repair of hip and needed filter in place to protect patient during surgery off anticoagulation. Patient was taken to the OR on 7/3/2017. Patient underwent right hip cephalomedullary nail fixation by Dr. Branden Gonzales. Post-op patient was WBAT to the right lower extremity as per Orthopedics recommendation. Post-op, patient placed back on IV Heparin drip for treatment of pulmonary embolus. Perineural pain catheter placed by Anesthesia Pain Service with continuous infusion of Ropivacaine to help with pain control. PT/OT were consulted and evaluating patient. Patient transferred from MICU to floor on 7/5 after further weaning of oxygen and currently patient is requiring no oxygen therapy on 7/5. IV Heparin drip discontinued on 7/5 and patient started on Apixiban 10 mg po BID for 7 days then decrease to 50 mg po BID for treatment of PE as well as for long term anticoagulation for atrial fibrillation. Patient back in sinus rhythm post-op. Patient remains off oxygen and doing well with Pt/OT who are recommending SNF placement and  working on SNF placement. Patient was accepted at [facility] for 7/7.  Patient medically stable and discharged to [facility]. Patient to continue Apixiban on discharge for PE treatment as well as long term anticoagulation for atrial fibrillation. Patient discharged on Apixiban 10mg by mouth twice daily for 5 more days with end date of 7/12/2017 then decrease dose to 5 mg my mouth twice daily on 7/13/2017 and continue indefinitely for treatment of PE and also for stroke prevention for atrial fibrillation. Surgical bandage to remain in place to right hip until Orthopedic clinic follow-up on 7/17/2017 with LEANA Ramirez. Patient discharged on Norco 5/325 1 tablet po every 4 hours as needed for pain.

## 2017-07-05 NOTE — PROGRESS NOTES
Acute Rehab Supervisor paged and spoke with Dr. Magana @ ~ 3:30pm via phone to alert her that this patient will be seen today by PT, Jordy Moore as well as OT, Adi Peña. Sticky notes for PT and OT were also updated by the Supervisor and therapist to indicate that this is an Ortho patient and must be seen daily.     Therapy will continue to follow.       Chico Greenberg, CCC-SLP  Speech Language Pathologist  [phone]  7/5/2017

## 2017-07-05 NOTE — PLAN OF CARE
1:35 PM  SW called in LOCET to state. Faxed [facility]RR to [facility]. Will await 142.   2:29 PM  Received 142.     Shyanne Veliz, VICKIE    [phone]

## 2017-07-05 NOTE — PROGRESS NOTES
07/05/17 1334   Urine Assessment   Bladder Scan Volume (mL) (>637)   pT denying urge to void & abdominal fullness. MD Chino updated. Ordered In/out cath. MD Chino also stated give Eliquis 2 hour post Heparin gtt d/c

## 2017-07-05 NOTE — RESIDENT HANDOFF
Handoff     Primary Team: [facility] CRITICAL CARE MEDICINE Room Number: 3095/3095 A     Patient Name: Virgil Melo Jr. MRN: [id number]     Date of Birth: [id number] Allergies: Penicillins     Age: 88 y.o. Admit Date: 7/1/2017     Sex: male  BMI: Body mass index is 27.4 kg/m².     Code Status: Partial Code        Illness Level (current clinical status): Watcher - Yes -     Reason for Admission: Acute saddle pulmonary embolism    Brief HPI (pertinent PMH and diagnosis or differential diagnosis): 88 year old man with hypoxic respiratory failure.      Patient had a fall in nursing home and during preop evaluation for surgical hip fixation was desaturating to 76% on 5L nasal cannula, with afib in 90's, and BP systolic>110.He was then placed on nonrebreather and sat 91% at 1209 am.     Procedure Date: S/p IVC filter placement (7/3/2017) and intramedullary nailing of right femur  (7/3/2017)    Hospital Course (updated, brief assessment by system or problem, significant events): POD #1 s/p IM nailing of Right Femur and s/p IVC filter- Was extubated and on high flow O2.    Tasks (specific, using if-then statements): Close monitoring of blood pressure, and respiratory status. On heparin for anticoagulation, will likely need alternative anticoagulation in future. Patient had some episodes of elevated blood pressure but was controlled with home medications.     Contingency Plan (special circumstances anticipated and plan): Continue heparin for anticoagulation and scheduled Coreg for blood pressure control.    Estimated Discharge Date: Pending clinical improvement and ambulation    Discharge Disposition: Nursing Facility    Mentored By: Dr. Surekha Puentes

## 2017-07-05 NOTE — PLAN OF CARE
Problem: Physical Therapy Goal  Goal: Physical Therapy Goal  Goals to be met by: 2017     Patient will increase functional independence with mobility by performin. Supine to sit with Moderate Assistance  2. Sit to supine with Moderate Assistance  3. Rolling to Left and Right with Minimum Assistance.  4. Sit to stand transfer with Moderate Assistance  5. Bed to chair transfer with Moderate Assistance using Rolling Walker  6. Gait  x 10 feet with Moderate Assistance using Rolling Walker.   7. Lower extremity exercise program x 15 reps per handout, with assistance as needed (not issued upon eval) MET      Outcome: Ongoing (interventions implemented as appropriate)  Updated pt goals. All goals are appropriate. Continue plan of care.     Eleazar Molina, SPT  17

## 2017-07-05 NOTE — PT/OT/SLP EVAL
Occupational Therapy  Evaluation    Virgil Melo Jr.   MRN: [id number]   Admitting Diagnosis: Closed intertrochanteric fracture of right femur    OT Date of Treatment: 07/05/17   OT Start Time: 1410  OT Stop Time: 1440  OT Total Time (min): 30 min    Billable Minutes:  Evaluation 20  Self Care/Home Management 10    Diagnosis: Closed intertrochanteric fracture of right femur       Past Medical History:   Diagnosis Date    Acquired hypothyroidism 2/20/2013    Acute saddle pulmonary embolism 7/2/2017    Benign non-nodular prostatic hyperplasia without lower urinary tract symptoms     Closed intertrochanteric fracture of right femur s/p IMN on 7/3/17 7/1/2017    Essential hypertension     HEARING LOSS     uses hearing aids    Macular degeneration - Both Eyes 1/15/2013    Mixed hyperlipidemia 10/30/2012    Paroxysmal atrial fibrillation 7/2/2017    Partial symptomatic epilepsy with complex partial seizures, not intractable, without status epilepticus     Peripheral vascular disease 10/30/2012    Posterior vitreous detachment 1/15/2013    Pseudophakia of both eyes 1/15/2013    S/P AVR (aortic valve replacement) 10/30/2012    Scoliosis (and kyphoscoliosis), idiopathic     Stroke     Vascular dementia without behavioral disturbance 10/9/2012      Past Surgical History:   Procedure Laterality Date    AORTIC VALVE REPLACEMENT  08/2011    23 mm porcine AVR    CARDIAC CATHETERIZATION  6/11    mild non-obstructive CAD    CATARACT EXTRACTION, BILATERAL      CHOLECYSTECTOMY      HEMORRHOID SURGERY      INTERTROCHANTERIC HIP FRACTURE SURGERY Right 07/03/2017    IM nail           General Precautions: Standard, fall  Orthopedic Precautions: RLE weight bearing as tolerated  Braces: N/A    Do you have any cultural, spiritual, Anabaptist conflicts, given your current situation?: None     Patient History:  Living Environment  Lives With: alone  Living Arrangements: assisted living  Living Environment Comment: Pt lives  in an assisted living apt and has one TONI.  Has a walk-in shower.  Equipment Currently Used at Home: shower chair, cane, straight    Prior level of function:   Bed Mobility/Transfers: independent  Grooming: independent  Bathing: independent  Upper Body Dressing: independent  Lower Body Dressing: independent  Toileting: independent  Home Management Skills: independent  Homemaking Responsibilities: Yes         Subjective:  Communicated with RN prior to session.    Chief Complaint: Pt is s/p IMN R femur and is WBAT R LE.  Also noted to have PE.  Patient/Family stated goals: To get better.    Pain/Comfort  Pain Rating 1: 0/10    Objective:  Patient found with: telemetry, perineural catheter    Cognitive Exam:  Oriented to: Person and Situation  Follows Commands/attention: Follows multistep  commands  Communication: clear/fluent  Memory:  Impaired LTM  Safety awareness/insight to disability: impaired  Coping skills/emotional control: Appropriate to situation    Visual/perceptual:  Intact    Physical Exam:  Postural examination/scapula alignment: Head forward  Skin integrity: Visible skin intact  Edema: None noted     Sensation:   Intact    Upper Extremity Range of Motion:  Right Upper Extremity: WFL  Left Upper Extremity: WFL    Upper Extremity Strength:  Right Upper Extremity: Deficits: 3-/5  Left Upper Extremity: Deficits: 3-/5   Strength: 3/5    Fine motor coordination:   Intact    Gross motor coordination: WFL    Functional Mobility:  Bed Mobility:  Supine to Sit: Maximum Assistance    Transfers:  Sit <> Stand Assistance: Total Assistance  Sit <> Stand Assistive Device: No Assistive Device  Bed <> Chair Technique: Stand Pivot  Bed <> Chair Transfer Assistance: Total Assistance  Bed <> Chair Assistive Device: No Assistive Device            Balance:   Static Sit: POOR+: Needs MINIMAL assist to maintain  Dynamic Sit: POOR: N/A  Static Stand: 0: Needs MAXIMAL assist to maintain   Dynamic stand: POOR:  "N/A        AM-PAC 6 CLICK ADL  How much help from another person does this patient currently need?  1 = Unable, Total/Dependent Assistance  2 = A lot, Maximum/Moderate Assistance  3 = A little, Minimum/Contact Guard/Supervision  4 = None, Modified [place]/Independent    Putting on and taking off regular lower body clothing? : 1  Bathing (including washing, rinsing, drying)?: 1  Toileting, which includes using toilet, bedpan, or urinal? : 1  Putting on and taking off regular upper body clothing?: 2  Taking care of personal grooming such as brushing teeth?: 3  Eating meals?: 3  Total Score: 11    AM-PAC Raw Score CMS "G-Code Modifier Level of Impairment Assistance   6 % Total / Unable   7 - 9 CM 80 - 100% Maximal Assist   10-14 CL 60 - 80% Moderate Assist   15 - 19 CK 40 - 60% Moderate Assist   20 - 22 CJ 20 - 40% Minimal Assist   23 CI 1-20% SBA / CGA   24 CH 0% Independent/ Mod I       Patient left up in chair with all lines intact, call button in reach and RN notified    Assessment:  Virgil Melo Jr. is a 88 y.o. male with a medical diagnosis of Closed intertrochanteric fracture of right femur and presents with deficits in ADL's and functional T/F's.  Pt was able to perform supine/sit T/F c max A and sit/stand and bed/chair T/F c total assist.  B UE are WFL.  Has poor static sitting balance and standing balance.  Required max time to perform T/F.  Was confused at times during assessment and was Ox2.    Rehab identified problem list/impairments: Rehab identified problem list/impairments: weakness, impaired endurance, impaired self care skills, impaired functional mobilty, orthopedic precautions    Rehab potential is good.    Activity tolerance: Good    Discharge recommendations: Discharge Facility/Level Of Care Needs: nursing facility, skilled     Barriers to discharge: Barriers to Discharge: Decreased caregiver support    Equipment recommendations:  (TBD)     GOALS:    Occupational Therapy Goals     "    Problem: Occupational Therapy Goal    Goal Priority Disciplines Outcome Interventions   Occupational Therapy Goal     OT, PT/OT     Description:  Goals to be met by: 7/19/17     Patient will increase functional independence with ADLs by performing:    UE Dressing with Moderate Assistance.  LE Dressing with Moderate Assistance.  Grooming while seated with Minimal Assistance.  Toileting from bedside commode with Moderate Assistance for hygiene and clothing management.   Bathing from  edge of bed with Moderate Assistance.  Toilet transfer to bedside commode with Moderate Assistance.  Increased functional strength to WFL for B UE.  Upper extremity exercise program x15 reps per handout, with assistance as needed.                      PLAN:  Patient to be seen 5 x/week to address the above listed problems via self-care/home management, therapeutic activities, therapeutic exercises  Plan of Care expires: 07/19/17  Plan of Care reviewed with: patient         GUILLERMO Vidal  07/05/2017

## 2017-07-05 NOTE — PROGRESS NOTES
"Patient requires daily PT/OT notes to be worked up for SNF placement and receive insurance authorization. CM noted that the patient was not assigned a physical therapist today (7/5/17). PT/OT notes only entered on 7/4/17 since admission. CM contacted PT supervisor (Janey) to inquire about PT assignment for patient to ensure patient is seen today- CM needs PT notes to submit to insurance and skilled facilities. CM was informed by PT supervisor that typically hip fracture patients are not seen everyday and "they will be seen in accordance with clinician recommendations which in this case will be 5 days a week". CM explained that according to the physician (Dr. Magana) all hip fracture patients need to be everyday by PT/OT. CM was informed that a "PT would not see the patient today and will only be seen 5 days a week in accordance with the clinician recommendations". CM updated Dr. Magana with all above information. Dr. Magana to contact PT supervisor. CM to continue to follow up.     Addendum on 7/5/17 at 1500: CM informed by physician (Dr. Magana) that an attempt was made by the physician to contact PT Supervisor (Janey) and Dr. Magana was told that Janey was not available and out of the office. MD to transfer patient to 5th floor POSS unit.     Addendum on 7/5/17 at 1525: CM informed by YENNY Bob that patient will be seen today by PT Jordy morales. CM to inform Dr. Magana.    Nydia Gruber RN, CM [facility]  [phone] -[phone]    "

## 2017-07-05 NOTE — PROGRESS NOTES
Pt's BP manually 195/98. MD Notified. Instructed to give PRN Hydralazine 25 mg. Order would be carried out. Will continue to monitor.

## 2017-07-05 NOTE — ADDENDUM NOTE
Addendum  created 07/05/17 0901 by Latonya Stockton MD    Charge Capture section accepted, Sign clinical note

## 2017-07-05 NOTE — PLAN OF CARE
12:00PM  SW delivered SNF list to pt. Pt asked that SW call daughter, Marta. Called daughter to discuss SNF choices. Left voicemail for daughter to call back.     Shyanne Veliz, VICKIE    [phone]

## 2017-07-05 NOTE — PROGRESS NOTES
pT straight cath per order. Sterile technique implemented to prevent UTI. 1L removed. Charted in I&O in MAR

## 2017-07-05 NOTE — PT/OT/SLP PROGRESS
Physical Therapy  Treatment    Virgil Melo Jr.   MRN: [id number]   Admitting Diagnosis: Closed intertrochanteric fracture of right femur    PT Received On: 07/05/17  PT Start Time: 1530     PT Stop Time: 1600    PT Total Time (min): 30 min       Billable Minutes:  Therapeutic Activity 15 minutes and Therapeutic Exercise 15 minutes    Treatment Type: Treatment  PT/PTA: PT             General Precautions: Standard, fall  Orthopedic Precautions: RLE weight bearing as tolerated   Braces: N/A         Subjective:  Communicated with Sushil KIMBALL RN prior to session.  Pt found awake and alert seated in bedside chair with two family members present. Pt was unable to answer how he got into the chair.     Pain/Comfort  Pain Rating 1: 0/10    Objective:   Patient found with: telemetry, perineural catheter    Functional Mobility:  Bed Mobility:   Rolling/Turning to Left:  (n/a 2° pt found and left UIC)  Rolling/Turning Right:  (n/a 2° pt found and left UIC)  Scooting/Bridging: Maximum Assistance (Pt max A with scooting towards edge of bedside chair requiring maximum verbal and tactile cues)  Supine to Sit:  (n/a 2° pt found and left UIC)  Sit to Supine:  (n/a 2° pt found and left UIC)    Transfers:  Sit <> Stand Assistance: Total Assistance (PT attempted sit <>stand transfer with RW x 2 trials. Pt needed max cueing to sequence sit <> stand including scooting towards the edge of the chair, reaching for the RW, and using all four limbs. Pt unable to fully extend hips, knees, or trunk.)  Sit <> Stand Assistive Device: Rolling Walker    Gait:   Gait Distance: n/a 2° pt weakness    Balance:    Static Sitting: Maximum Assistance   Dynamic Sitting: Total Assistance   Static Standing: Total Assistance   Dynamic Standing: Activity did not occur       Therapeutic Activities and Exercises:  Seated quad sets x 30 reps BLE - seated glute sets x 30 reps BLE - seated AP's x 30 reps BLE     Educated pt and pt family about importance of performing 3  exercises listed above 5x/day.     AM-PAC 6 CLICK MOBILITY  How much help from another person does this patient currently need?   1 = Unable, Total/Dependent Assistance  2 = A lot, Maximum/Moderate Assistance  3 = A little, Minimum/Contact Guard/Supervision  4 = None, Modified [place]/Independent    Turning over in bed (including adjusting bedclothes, sheets and blankets)?: 2  Sitting down on and standing up from a chair with arms (e.g., wheelchair, bedside commode, etc.): 1  Moving from lying on back to sitting on the side of the bed?: 1  Moving to and from a bed to a chair (including a wheelchair)?: 1  Need to walk in hospital room?: 1  Climbing 3-5 steps with a railing?: 1  Total Score: 7    AM-PAC Raw Score CMS G-Code Modifier Level of Impairment Assistance   6 % Total / Unable   7 - 9 CM 80 - 100% Maximal Assist   10 - 14 CL 60 - 80% Moderate Assist   15 - 19 CK 40 - 60% Moderate Assist   20 - 22 CJ 20 - 40% Minimal Assist   23 CI 1-20% SBA / CGA   24 CH 0% Independent/ Mod I     Patient left up in chair with all lines intact, call button in reach and family present.    Assessment:  Virgil Melo Jr. is a 88 y.o. male with a medical diagnosis of Closed intertrochanteric fracture of right femur and presents with generalized weakness, impaired endurance with sitting unsupported, impaired functional mobility, RLE WBAT, impaired coordination with performing sit <> stand transfer, and impaired cognition. Pt is appropriate for and will benefit from continued acute PT intervention. PT will focus on BLE therex, increasing tolerance to sitting unsupported with good posture, and improving sit <> stand and bed <> chair transfers. PT recommends D/C to skilled nursing facility upon meeting acute goals.     Rehab identified problem list/impairments: Rehab identified problem list/impairments: weakness, impaired endurance, impaired functional mobilty, orthopedic precautions, impaired coordination, impaired  cognition    Rehab potential is good.    Activity tolerance: Poor    Discharge recommendations: Discharge Facility/Level Of Care Needs: nursing facility, skilled     Barriers to discharge: Barriers to Discharge: Decreased caregiver support    Equipment recommendations: Equipment Needed After Discharge: none     GOALS:    Physical Therapy Goals        Problem: Physical Therapy Goal    Goal Priority Disciplines Outcome Goal Variances Interventions   Physical Therapy Goal     PT/OT, PT Ongoing (interventions implemented as appropriate)     Description:  Goals to be met by: 2017     Patient will increase functional independence with mobility by performin. Supine to sit with Moderate Assistance  2. Sit to supine with Moderate Assistance  3. Rolling to Left and Right with Minimum Assistance.  4. Sit to stand transfer with Moderate Assistance  5. Bed to chair transfer with Moderate Assistance using Rolling Walker  6. Gait  x 10 feet with Moderate Assistance using Rolling Walker.   7. Lower extremity exercise program x 15 reps per handout, with assistance as needed (not issued upon eval) MET                        PLAN:    Patient to be seen daily  to address the above listed problems via gait training, therapeutic activities, therapeutic exercises  Plan of Care expires: 17  Plan of Care reviewed with: patient, family         ADELAIDA Urbina  2017

## 2017-07-05 NOTE — PROGRESS NOTES
ORTHO PROGRESS NOTE:    Virgil Melo Jr. is a 88 y.o. male admitted on 7/1/2017  Hospital Day: 4      The patient was seen and examined this morning at the bedside. No acute issues overnight and adequate control of pain on current regimen.    Patient worked with physical therapy over the last 24 hours and was max assist for all activities.  Stepped down from ICU yesterday.    PHYSICAL EXAM:  Awake/alert/oriented x 3  No acute distress  AFVSS (mild HTN)  Good inspiratory effort with unlabored breathing; stable on 3L via NC    RLE : dressings c/d/i. NVI distally.    Vitals:    07/05/17 0200 07/05/17 0239 07/05/17 0300 07/05/17 0400   BP:  (!) 160/85     BP Location:  Right arm     Patient Position:  Lying     BP Method:  Automatic     Pulse: 70  73 72   Resp:       Temp:       TempSrc:       SpO2:       Weight:       Height:         I/O last 3 completed shifts:  In: 2074 [P.O.:690; I.V.:684; IV Piggyback:700]  Out: 2330 [Urine:2330]    Recent Labs  Lab 07/02/17  0728 07/03/17  0209 07/04/17  0345   CALCIUM 8.7 7.9* 7.9*  7.9*   PROT 7.2 6.1 6.5    141 138  138   K 4.0 3.5 4.3  4.3   CO2 19* 19* 19*  19*    112* 109  109   BUN 29* 39* 23  23   CREATININE 1.1 1.2 0.9  0.9       Recent Labs  Lab 07/04/17  0345 07/04/17  0754 07/04/17  1752   WBC 9.07  9.07 9.11 10.49   RBC 3.48*  3.48* 3.52* 3.52*   HGB 10.5*  10.5* 10.8* 10.6*   HCT 32.7*  32.7* 32.6* 32.7*   *  131* 132* 130*       Recent Labs  Lab 07/04/17  0345   APTT 82.7*       A/P: 88 y.o. male 2 Days Post-Op s/p IMN of right intertrochanteric femur fracture  -- Pain control  -- PT/OT: WBAT  -- DVT prophylaxis: remains on heparin drip for PE    Mike Casale, M.D. PGY-4  Department of Orthopedic Surgery      Attg Note:  I agree with the above assessment and plan.    Branden Gonzales MD

## 2017-07-05 NOTE — PLAN OF CARE
Problem: Patient Care Overview  Goal: Plan of Care Review  Outcome: Revised  Plan of care discussed with patient & daughter Marta at bedside. Patient is free of fall/trauma/injury. Denies CP, SOB, or pain/discomfort. pT weaned from NC O2; meeting SpO2 goal >= 88% on RA. Heparin gtt infusing per order; AXA monitoring implemented. Analgesic gtt infusing per order. All questions addressed. Will continue to monitor

## 2017-07-06 PROBLEM — E55.9 VITAMIN D DEFICIENCY DISEASE: Status: ACTIVE | Noted: 2017-01-01

## 2017-07-06 NOTE — ANESTHESIA POST-OP PAIN MANAGEMENT
Acute Pain Service Progress Note    Virgil Melo Jr. is a 88 y.o., male, [id number] with PMHx of HTN, HLD, PVD, BPH, hypothyroidism, a fib, AS (s/p AVR), partial epilepsy, PE (s/p IVC filter placement), and OA who is s/p IM nailing of R femur 7/3/17    Surgery:  IM Nailing of Femur- Right    Post Op Day #: 3    Catheter type: perineural  Suprainguinal Fascia Iliaca     Infusion type: Ropivacaine 0.2%  8 mL/hr     Problem List:    Active Hospital Problems    Diagnosis  POA    *Closed intertrochanteric fracture of right femur s/p IMN on 7/3/17 [S72.141A]  Yes    Encounter for aftercare for healing closed traumatic fracture of hip [S72.009D]  Not Applicable    Acute saddle pulmonary embolism [I26.92]  Yes    Paroxysmal atrial fibrillation [I48.0]  Yes    Unsteady gait [R26.81]  Yes    Acute respiratory failure with hypoxia [J96.01]  Yes    Acquired hypothyroidism [E03.9]  Yes    Osteoporosis, senile [M81.0]  Yes    Vascular dementia without behavioral disturbance [F01.50]  Yes    Essential hypertension [I10]  Yes    Partial symptomatic epilepsy with complex partial seizures, not intractable, without status epilepticus [G40.209]  Yes      Resolved Hospital Problems    Diagnosis Date Resolved POA    Closed intertrochanteric fracture of femur [S72.143A] 07/05/2017 Yes    Femur fracture, right [S72.91XA] 07/05/2017 Yes    Tachycardia [R00.0] 07/05/2017 Yes       Subjective:     General appearance of alert, oriented, no complaints   Pain with rest: 1    Numbers   Pain with movement: 1    Numbers   Side Effects    1. Pruritis No    2. Nausea No    3. Motor Blockade No, 0=Ability to raise lower extremities off bed    4. Sedation No, 1=awake and alert    Objective:     Catheter site clean, dry, intact        Vitals   Vitals:    07/06/17 0655   BP:    Pulse: 70   Resp:    Temp:         Labs    Admission on 07/01/2017   No results displayed because visit has over 200 results.           Meds   Current  Facility-Administered Medications   Medication Dose Route Frequency Provider Last Rate Last Dose    0.9%  NaCl infusion (for blood administration)   Intravenous Q24H PRN Michael Joseph Casale, MD        acetaminophen tablet 650 mg  650 mg Oral Q6H PRN Arup K. Trista, DO        albuterol-ipratropium 2.5mg-0.5mg/3mL nebulizer solution 3 mL  3 mL Nebulization Q4H PRN Arup K. Trista, DO        apixaban tablet 10 mg  10 mg Oral BID Falguni Magana MD   10 mg at 07/05/17 2037    Followed by    [START ON 7/12/2017] apixaban tablet 5 mg  5 mg Oral BID Falguni Magana MD        atorvastatin tablet 40 mg  40 mg Oral Daily Arup KElijah Trista, DO   40 mg at 07/05/17 0912    calcium-vitamin D3 500 mg(1,250mg) -200 unit per tablet 1 tablet  1 tablet Oral BID Arup ROSAURA Trista, DO   1 tablet at 07/05/17 2036    carvedilol tablet 12.5 mg  12.5 mg Oral BID Tori Morocho MD   12.5 mg at 07/05/17 2036    gabapentin capsule 600 mg  600 mg Oral TID Arup ROSAURA Trista, DO   600 mg at 07/06/17 0642    hydrALAZINE tablet 25 mg  25 mg Oral Q6H PRN Hanane Simpson MD   25 mg at 07/05/17 0602    levetiracetam tablet 750 mg  750 mg Oral BID Arup KEliajh Trista, DO   750 mg at 07/05/17 2036    levothyroxine tablet 137 mcg  137 mcg Oral Before breakfast Falguni Magana MD        memantine tablet 10 mg  10 mg Oral Daily Arup K. Trista, DO   10 mg at 07/05/17 0912    morphine injection 4 mg  4 mg Intravenous Q4H PRN Arup K. Trista, DO   4 mg at 07/01/17 2136    ondansetron injection 4 mg  4 mg Intravenous Q6H PRN Arup K. Trista, DO        oxycodone immediate release tablet 5 mg  5 mg Oral Q4H PRN Arup K. Trista, DO   5 mg at 07/02/17 0724    phenobarbital tablet 30 mg  30 mg Oral TID Arup ROSAURA Weston, DO   30 mg at 07/06/17 0642    ropivacaine (PF) 2 mg/ml (0.2%) infusion  8 mL/hr Perineural Continuous Obinna Lee MD 8 mL/hr at 07/05/17 2314 8 mL/hr at 07/05/17 2314    senna-docusate 8.6-50 mg per tablet 1 tablet  1 tablet Oral  BID IVANN Kimberley Del Real MD             Assessment:  Virgil Melo Jr. is a 88 y.o., male, [id number] with PMHx of HTN, HLD, PVD, BPH, hypothyroidism, a fib, AS (s/p AVR), partial epilepsy, PE (s/p IVC filter placement), and OA who is s/p IM nailing of R femur 7/3/17.     Pain control adequate    Plan:    Patient doing well, continue present treatment. Pt pain well controlled with infusion catheter and PRN Oxycodone. Will cont current management and f/u with primary team regarding discharge planning.    Evaluator Maria Teresa Nevarez            I have seen the patient, reviewed the Resident's assessment and plan. I have personally interviewed and examined the patient at bedside and: agree with the findings.   Pt doing well with multimodals; pain well controlled   Tolerating po well  Continue with multimodal analgesia with goal to minimize narcotic usage

## 2017-07-06 NOTE — ASSESSMENT & PLAN NOTE
Patient back in sinus rhythm. Patient's CHADsVASC score is 6 so needs long term anticoagulation and patient started on Apixiban and needs indefinite treatment. Will continue Coreg 12.5 mg po BID for rate control.

## 2017-07-06 NOTE — PLAN OF CARE
12:16 PM  SW received call from Blanca with [facility]'s who stated they will be coming to hospital today to assess pt. Will call and follow up.     Shyanne Veliz LMSW    [id number]

## 2017-07-06 NOTE — ASSESSMENT & PLAN NOTE
Patient's blood pressure is controlled here in the hospital over past 24 hours. Goal for blood pressure is SBP < 150 and DBP < 90 as patient > or = 60 years of age with no diabetes or advanced kidney disease based on JNC 8 guidelines. Continue current treatment regimen of Coreg 12.5 mg po BID that was started in MICU. Plan to continue to monitor patient's blood pressure routinely while patient is hospitalized.

## 2017-07-06 NOTE — NURSING TRANSFER
Nursing Transfer Note      7/5/2017     Transfer From: CSU to 544    Transfer via bed    Transfer with cardiac monitoring    Transported by RN and PCT    Medicines sent: Yes    Chart send with patient: Yes    Notified: daughter    Patient reassessed at: Upon arrival to floor    Upon arrival to floor: cardiac monitor applied, patient oriented to room, call bell in reach and bed in lowest position

## 2017-07-06 NOTE — ASSESSMENT & PLAN NOTE
Encounter for aftercare for healing closed traumatic fracture of hip  Patient is POD # 3. Patient reports minimal pain in right hip. Plan is to continue PT/OT for gait training and strengthening and restoration of ADL's. Patient is FWB/WBAT: right lower extremity as per Orthopedic recommendations. Patient on Apixiban for treatment of PE so no Lovenox needed for DVT prophylaxis. Orthopedics is following and managing surgical site. Pain is well controlled with perineural pain catheter in place as per Anesthesia pain service. PT/OT recommending skilled nursing facility placement and  working on choice of skilled facility with patient and family. Patient medically stable for discharge and just awaiting SNF placement.

## 2017-07-06 NOTE — ASSESSMENT & PLAN NOTE
Patient admitted with hip fracture related to osteoporosis. Patient has been advised to avoid smoking and moderation of alcohol use with regular weightbearing exercise to help reduce risk of future fractures associated with osteoporosis. Will check Vitamin D level and if low will replace with Vitamin D 50,000 units po weekly for 6-8 weeks and recheck level as outpatient. Patient will need outpatient DEXA scan and further evaluation for additional treatment of osteoporosis. Will refer patient to [facility] on discharge for further evaluation and management of osteoporosis.

## 2017-07-06 NOTE — PROGRESS NOTES
[facility] Hospital Medicine  Progress Note    Patient Name: Virgil Melo Jr.  MRN: [id number]  [facility] Medicine Service: H  Admission Date: 7/1/2017  Length of Stay: 5 days  Expected Discharge Date: 7/10/2017  Attending Physician: Falguni Magana MD  Primary Care Provider: Luis A Milton MD    Subjective:     3 Days Post-Op from right hip cephalomedullary nail fixation for fracture     Orthopedic Surgeon: Dr. Branden Gonzales  Weight Bearing Status: WBAT right lower extremity    Follow-up Visit For: Closed intertrochanteric fracture of right femur    HPI:  88 M with a PMH of Epilepsy since childhood ( on keppra, phenobarbital and gabapentin and [place] [facility] [place] dementia,  HTN, Hypothyroidism, aortic stenosis  S/P bioprosthetic AV replacement more than ten years ago presents as a transfer from [facility] for further evaluation and management of right hip fracture. Patient is a poor informant given h/o dementia. Daughter at bedside provided HPI. Patient lives at assisted living facility and ambulates with cane due to unsteady gait. He had an unwitnessed fall inside his room Friday evening (06/30 ) around 6 pm while taking off his pants. Daughter states she thinks patient might have lost balance while undressing and fell to the floor on his buttock. Nursing staff found patient on the floor on his buttock and complaining of right hip pain, notified daughter and sent him to [facility] via EMS. At [facility] imaging including CT pelvis showed intertrochanteric right hip fracture and new onset Afib. Patient's insurance not accepted by [facility] and [place] [facility] Daughter states since her mother passed away couple of years ago she has been managing his medications and he has been taking only the following meds : keppra, phenobarbital, gabapentin and namenda.      During my interview patient is awake, but confused. Oriented to person only and complaining of right hip pain. Morphine 4 mg IV x 1 given  with improved pain. O2 sat in mid 80s on 5 liter N/C. Elevated troponin 0.248, no chest pain, STAT EKG showed afib with RVR ( HR ~ 110 ). HR improved to 80s after lopressor 5 mg IVP. O2 sat improved to low 90s on NRB mask. Cardiology was consulted for pre op evaluation given elevated troponin, afib W RVR and hypoxia. STAT CTA chest showed extensive pulmonary saddle embolus, involving all lobes with associated right heart strain. CT head negative for bleeding. CCS was consulted and patient was transferred to ICU after CTA showed saddle embolus for close monitoring of hemodynamic/respiratory status.       Interval History: Patient denies any SOB or chest pain and remains off oxygen and oxygen sats remain > 90% on room air. Patient reports 5/10 pain to right hip.     Review of Systems   Constitutional: Negative for chills and fever.   HENT: Negative for congestion.    Respiratory: Negative for cough and shortness of breath.    Cardiovascular: Negative for chest pain and palpitations.   Gastrointestinal: Negative for abdominal pain, diarrhea, nausea and vomiting.   Genitourinary: Negative for dysuria.   Musculoskeletal: Positive for myalgias (Right hip pain). Negative for back pain.   Skin: Negative for rash.   Neurological: Negative for dizziness and light-headedness.   Psychiatric/Behavioral: Negative for confusion and hallucinations.     Objective:     Vital Signs (Most Recent):  Temp: 96.6 °F (35.9 °C) (07/06/17 1200)  Pulse: 68 (07/06/17 1446)  Resp: 17 (07/06/17 1200)  BP: 137/73 (07/06/17 1200)  SpO2: (!) 94 % (07/06/17 1200) Vital Signs (24h Range):  Temp:  [96.6 °F (35.9 °C)-98.4 °F (36.9 °C)] 96.6 °F (35.9 °C)  Pulse:  [68-83] 68  Resp:  [16-20] 17  SpO2:  [91 %-94 %] 94 %  BP: (137-183)/(73-84) 137/73     Weight: 91.6 kg (202 lb)  Body mass index is 27.4 kg/m².  No intake or output data in the 24 hours ending 07/06/17 1523   Physical Exam   Constitutional: He appears well-developed and well-nourished. No  distress.   HENT:   Mouth/Throat: Oropharynx is clear and moist.   Eyes: No scleral icterus.   Cardiovascular: Normal rate, regular rhythm and normal heart sounds.  Exam reveals no gallop and no friction rub.    No murmur heard.  Pulmonary/Chest: Effort normal and breath sounds normal. He has no wheezes. He has no rales.   Abdominal: Soft. Bowel sounds are normal. He exhibits no distension. There is no tenderness.   Musculoskeletal: He exhibits no edema.   Neurological: He is alert.   Oriented to person and place but not time   Skin: No rash noted. No erythema.   Surgical bandage in place to right hip   Psychiatric: He has a normal mood and affect. His behavior is normal.       Significant Labs:   CBC:   Recent Labs  Lab 07/05/17  0757 07/05/17  1854 07/06/17  0830   WBC 10.61 12.98* 10.80   HGB 11.5* 11.6* 11.6*   HCT 34.7* 34.9* 35.9*   * 147* 159     CMP:   Recent Labs  Lab 07/05/17  0409 07/06/17  0515    138   K 3.6 4.8    109   CO2 20* 20*   * 108   BUN 21 23   CREATININE 0.9 1.0   CALCIUM 8.4* 8.4*   PROT 6.5 6.3   ALBUMIN 2.7* 2.6*   BILITOT 0.7 0.6   ALKPHOS 92 88   AST 21 25   ALT 21 22   ANIONGAP 10 9   EGFRNONAA >60.0 >60.0     Lab Results   Component Value Date    [id number] 16 (L) 07/06/2017      Significant Imaging: I have reviewed all pertinent imaging results/findings within the past 24 hours.    ASSESSMENT/PLAN:     Acute saddle pulmonary embolism    Acute respiratory failure with hypoxia  Improved. Patient noted on admit to have acute saddle embolus present on admit with acute hypoxic respiratory failure due to PE. Patient was initailly on NRB on admit and then titrated to high flow oxygen in MICU. Patient started on IV Heparin drip for treatment of PE and had IVC filter placed on 7/3 so patient could undergo operative fixation of right hip fracture. Patient remains on IV Heparin drip post-op. Respiratory failure resolved and patient now on room air and oxygen sats 92%  and goal is to keep oxygen sats > 90%. IV Heparin drip discontinued on 7/5 and patient transitioned to oral Apixiban for long term anticoagulation for both atrial fibrillation and treatment of acute PE. Patient started on Apixiban 10 mg po BID for 7 days and then 5 mg po BID for indefinite treatment of both PE and stroke prophylaxis for atrial fibrillation.           * Closed intertrochanteric fracture of right femur s/p IMN on 7/3/17    Encounter for aftercare for healing closed traumatic fracture of hip  Patient is POD # 3. Patient reports minimal pain in right hip. Plan is to continue PT/OT for gait training and strengthening and restoration of ADL's. Patient is FWB/WBAT: right lower extremity as per Orthopedic recommendations. Patient on Apixiban for treatment of PE so no Lovenox needed for DVT prophylaxis. Orthopedics is following and managing surgical site. Pain is well controlled with perineural pain catheter in place as per Anesthesia pain service. PT/OT recommending skilled nursing facility placement and  working on choice of skilled facility with patient and family. Patient medically stable for discharge and just awaiting SNF placement.        Paroxysmal atrial fibrillation    Patient back in sinus rhythm. Patient's CHADsVASC score is 6 so needs long term anticoagulation and patient started on Apixiban and needs indefinite treatment. Will continue Coreg 12.5 mg po BID for rate control.           Essential hypertension    Patient's blood pressure is controlled here in the hospital over past 24 hours. Goal for blood pressure is SBP < 150 and DBP < 90 as patient > or = 60 years of age with no diabetes or advanced kidney disease based on JNC 8 guidelines. Continue current treatment regimen of Coreg 12.5 mg po BID that was started in MICU. Plan to continue to monitor patient's blood pressure routinely while patient is hospitalized.           Partial symptomatic epilepsy with complex partial  seizures, not intractable, without status epilepticus    Controlled. Continue Keppra and Phenobarbital to treat.         Vascular dementia without behavioral disturbance    Controlled. Continue Namenda to treat.         Osteoporosis, senile    Vitamin D deficiency  Patient admitted with hip fracture related to osteoporosis. Patient has been advised to avoid smoking and moderation of alcohol use with regular weightbearing exercise to help reduce risk of future fractures associated with osteoporosis. Vitamin D level is low at 16 consistent with moderate deficiency and will replace with Vitamin D 50,000 units po weekly and recheck level as outpatient in 6-8 weeks. Patient will need outpatient DEXA scan and further evaluation for additional treatment of osteoporosis. Will refer patient to [facility] on discharge for further evaluation and management of osteoporosis.            Acquired hypothyroidism    Controlled. Continue Synthroid at 137 mcg po daily to treat.               Anticipated Disposition: Skilled Nursing Facility    Time spent in care of patient (Greater than 1/2 spent in direct face-to-face contact): 22 minutes      Falguni Magana MD  Department of Hospital Medicine   [facility]

## 2017-07-06 NOTE — ASSESSMENT & PLAN NOTE
Acute respiratory failure with hypoxia  Improved. Patient noted on admit to have acute saddle embolus present on admit with acute hypoxic respiratory failure due to PE. Patient was initailly on NRB on admit and then titrated to high flow oxygen in MICU. Patient started on IV Heparin drip for treatment of PE and had IVC filter placed on 7/3 so patient could undergo operative fixation of right hip fracture. Patient remains on IV Heparin drip post-op. Respiratory failure resolved and patient now on room air and oxygen sats 92% and goal is to keep oxygen sats > 90%. IV Heparin drip discontinued on 7/5 and patient transitioned to oral Apixiban for long term anticoagulation for both atrial fibrillation and treatment of acute PE. Patient started on Apixiban 10 mg po BID for 7 days and then 5 mg po BID for indefinite treatment of both PE and stroke prophylaxis for atrial fibrillation.

## 2017-07-06 NOTE — ASSESSMENT & PLAN NOTE
Acute respiratory failure with hypoxia  Improved. Patient noted on admit to have acute saddle embolus present on admit with acute hypoxic respiratory failure due to PE. Patient was initailly on NRB on admit and then titrated to high flow oxygen in MICU. Patient started on IV Heparin drip for treatment of PE and had IVC filter placed on 7/3 so patient could undergo operative fixation of right hip fracture. Patient remains on IV Heparin drip post-op. Respiratory failure resolved and patient now on room air and oxygen sats 92% and goal is to keep oxygen sats > 90%.  Plan is to discontinue IV Heparin drip today, 7/5 and transition to oral Apixiban for long term anticoagulation for both atrial fibrillation and treatment of acute PE. Will place on Apixiban 10 mg po BID for 7 days and then 5 mg po BID for indefinite treatment of both PE and stroke prophylaxis for atrial fibrillation.

## 2017-07-06 NOTE — SUBJECTIVE & OBJECTIVE
Interval History: Patient denies any SOB or chest pain and remains off oxygen and oxygen sats remain > 90% on room air. Patient reports 5/10 pain to right hip.     Review of Systems   Constitutional: Negative for chills and fever.   HENT: Negative for congestion.    Respiratory: Negative for cough and shortness of breath.    Cardiovascular: Negative for chest pain and palpitations.   Gastrointestinal: Negative for abdominal pain, diarrhea, nausea and vomiting.   Genitourinary: Negative for dysuria.   Musculoskeletal: Positive for myalgias (Right hip pain). Negative for back pain.   Skin: Negative for rash.   Neurological: Negative for dizziness and light-headedness.   Psychiatric/Behavioral: Negative for confusion and hallucinations.     Objective:     Vital Signs (Most Recent):  Temp: 96.6 °F (35.9 °C) (07/06/17 1200)  Pulse: 68 (07/06/17 1446)  Resp: 17 (07/06/17 1200)  BP: 137/73 (07/06/17 1200)  SpO2: (!) 94 % (07/06/17 1200) Vital Signs (24h Range):  Temp:  [96.6 °F (35.9 °C)-98.4 °F (36.9 °C)] 96.6 °F (35.9 °C)  Pulse:  [68-83] 68  Resp:  [16-20] 17  SpO2:  [91 %-94 %] 94 %  BP: (137-183)/(73-84) 137/73     Weight: 91.6 kg (202 lb)  Body mass index is 27.4 kg/m².  No intake or output data in the 24 hours ending 07/06/17 1523   Physical Exam   Constitutional: He appears well-developed and well-nourished. No distress.   HENT:   Mouth/Throat: Oropharynx is clear and moist.   Eyes: No scleral icterus.   Cardiovascular: Normal rate, regular rhythm and normal heart sounds.  Exam reveals no gallop and no friction rub.    No murmur heard.  Pulmonary/Chest: Effort normal and breath sounds normal. He has no wheezes. He has no rales.   Abdominal: Soft. Bowel sounds are normal. He exhibits no distension. There is no tenderness.   Musculoskeletal: He exhibits no edema.   Neurological: He is alert.   Oriented to person and place but not time   Skin: No rash noted. No erythema.   Surgical bandage in place to right hip    Psychiatric: He has a normal mood and affect. His behavior is normal.       Significant Labs:   CBC:   Recent Labs  Lab 07/05/17  0757 07/05/17  1854 07/06/17  0830   WBC 10.61 12.98* 10.80   HGB 11.5* 11.6* 11.6*   HCT 34.7* 34.9* 35.9*   * 147* 159     CMP:   Recent Labs  Lab 07/05/17  0409 07/06/17  0515    138   K 3.6 4.8    109   CO2 20* 20*   * 108   BUN 21 23   CREATININE 0.9 1.0   CALCIUM 8.4* 8.4*   PROT 6.5 6.3   ALBUMIN 2.7* 2.6*   BILITOT 0.7 0.6   ALKPHOS 92 88   AST 21 25   ALT 21 22   ANIONGAP 10 9   EGFRNONAA >60.0 >60.0     Lab Results   Component Value Date    SSPTFBZS08FF 16 (L) 07/06/2017      Significant Imaging: I have reviewed all pertinent imaging results/findings within the past 24 hours.

## 2017-07-06 NOTE — PROGRESS NOTES
[facility] Medicine  Progress Note    Patient Name: Virgil Melo Jr.  MRN: [id number]  [facility] Medicine Service: H  Admission Date: 7/1/2017  Length of Stay: 4 days  Expected Discharge Date: 7/10/2017  Attending Physician: Falguni Magana MD  Primary Care Provider: Luis A Milton MD    Subjective:     2 Days Post-Op from right hip cephalomedullary nail fixation for fracture     Orthopedic Surgeon: Dr. Branden Gonzales  Weight Bearing Status: WBAT right lower extremity    Follow-up Visit For: Closed intertrochanteric fracture of right femur    HPI:  88 M with a PMH of Epilepsy since childhood ( on keppra, phenobarbital and gabapentin and followed by neurology [place] [facility] ), dementia,  HTN, Hypothyroidism, aortic stenosis  S/P bioprosthetic AV replacement more than ten years ago presents as a transfer from [facility] for further evaluation and management of right hip fracture. Patient is a poor informant given h/o dementia. Daughter at bedside provided HPI. Patient lives at assisted living facility and ambulates with cane due to unsteady gait. He had an unwitnessed fall inside his room Friday evening (06/30 ) around 6 pm while taking off his pants. Daughter states she thinks patient might have lost balance while undressing and fell to the floor on his buttock. Nursing staff found patient on the floor on his buttock and complaining of right hip pain, notified daughter and sent him to [facility] via EMS. At [facility] imaging including CT pelvis showed intertrochanteric right hip fracture and new onset Afib. Patient's insurance not accepted by [facility] and thus transferred to [facility]. Daughter states since her mother passed away couple of years ago she has been managing his medications and he has been taking only the following meds : keppra, phenobarbital, gabapentin and namenda.      During my interview patient is awake, but confused. Oriented to person only and complaining of right hip pain. Morphine 4 mg IV x 1 given  with improved pain. O2 sat in mid 80s on 5 liter N/C. Elevated troponin 0.248, no chest pain, STAT EKG showed afib with RVR ( HR ~ 110 ). HR improved to 80s after lopressor 5 mg IVP. O2 sat improved to low 90s on NRB mask. Cardiology was consulted for pre op evaluation given elevated troponin, afib W RVR and hypoxia. STAT CTA chest showed extensive pulmonary saddle embolus, involving all lobes with associated right heart strain. CT head negative for bleeding. CCS was consulted and patient was transferred to ICU after CTA showed saddle embolus for close monitoring of hemodynamic/respiratory status.       Interval History: Patient transferred from MICU to floor overnight. Patient denies any current chest pain or SOB. Patient reports minimal pain to right hip. Patient states he does not remember being in hospital and did not remember he had surgery or that he had broken his hip.     Review of Systems   Constitutional: Negative for chills and fever.   HENT: Negative for congestion.    Respiratory: Negative for cough and shortness of breath.    Cardiovascular: Negative for chest pain and palpitations.   Gastrointestinal: Negative for abdominal pain, diarrhea, nausea and vomiting.   Genitourinary: Negative for dysuria.   Musculoskeletal: Positive for myalgias (Right hip pain). Negative for back pain.   Skin: Negative for rash.   Neurological: Negative for dizziness and light-headedness.   Psychiatric/Behavioral: Positive for confusion. Negative for hallucinations.     Objective:     Vital Signs (Most Recent):  Temp: 98.4 °F (36.9 °C) (07/05/17 1608)  Pulse: 76 (07/05/17 1608)  Resp: 20 (07/05/17 1608)  BP: (!) 177/82 (07/05/17 1608)  SpO2: (!) 92 % (07/05/17 1608) Vital Signs (24h Range):  Temp:  [97.8 °F (36.6 °C)-99.8 °F (37.7 °C)] 98.4 °F (36.9 °C)  Pulse:  [70-82] 76  Resp:  [18-22] 20  SpO2:  [86 %-93 %] 92 %  BP: (147-186)/() 177/82     Weight: 91.6 kg (202 lb)  Body mass index is 27.4 kg/m².    Intake/Output  Summary (Last 24 hours) at 07/05/17 1859  Last data filed at 07/05/17 1500   Gross per 24 hour   Intake              820 ml   Output             1325 ml   Net             -505 ml      Physical Exam   Constitutional: He appears well-developed and well-nourished. No distress.   HENT:   Mouth/Throat: Oropharynx is clear and moist.   Neck: Neck supple. No JVD present.   Cardiovascular: Normal rate, regular rhythm and normal heart sounds.  Exam reveals no gallop and no friction rub.    No murmur heard.  Pulmonary/Chest: Effort normal and breath sounds normal. He has no wheezes. He has no rales.   Abdominal: Soft. Bowel sounds are normal. He exhibits no distension. There is no tenderness.   Musculoskeletal: He exhibits no edema.   Neurological: He is alert.   Oriented to person only   Skin: No rash noted. No erythema.   Surgical bandage in place to right hip   Psychiatric: He has a normal mood and affect. His behavior is normal.       Significant Labs:   BMP:   Recent Labs  Lab 07/05/17  0409   *      K 3.6      CO2 20*   BUN 21   CREATININE 0.9   CALCIUM 8.4*   MG 1.9     CBC:   Recent Labs  Lab 07/04/17  0754 07/04/17  1752 07/05/17  0757   WBC 9.11 10.49 10.61   HGB 10.8* 10.6* 11.5*   HCT 32.6* 32.7* 34.7*   * 130* 144*     Coagulation:   Recent Labs  Lab 07/04/17  0345   APTT 82.7*     Significant Imaging: I have reviewed all pertinent imaging results/findings within the past 24 hours.    ASSESSMENT/PLAN:     Acute saddle pulmonary embolism    Acute respiratory failure with hypoxia  Improved. Patient noted on admit to have acute saddle embolus present on admit with acute hypoxic respiratory failure due to PE. Patient was initailly on NRB on admit and then titrated to high flow oxygen in MICU. Patient started on IV Heparin drip for treatment of PE and had IVC filter placed on 7/3 so patient could undergo operative fixation of right hip fracture. Patient remains on IV Heparin drip post-op.  Respiratory failure resolved and patient now on room air and oxygen sats 92% and goal is to keep oxygen sats > 90%.  Plan is to discontinue IV Heparin drip today, 7/5 and transition to oral Apixiban for long term anticoagulation for both atrial fibrillation and treatment of acute PE. Will place on Apixiban 10 mg po BID for 7 days and then 5 mg po BID for indefinite treatment of both PE and stroke prophylaxis for atrial fibrillation.           * Closed intertrochanteric fracture of right femur s/p IMN on 7/3/17    Encounter for aftercare for healing closed traumatic fracture of hip  Patient is POD #2. Patient reports minimal pain in right hip. Plan is to continue PT/OT for gait training and strengthening and restoration of ADL's. Patient is FWB/WBAT: right lower extremity as per Orthopedic recommendations. Patient on Apixiban for treatment of PE so no Lovenox needed for DVT prophylaxis. Orthopedics is following and managing surgical site. Pain is well controlled with perineural pain catheter in place as per Anesthesia pain service. PT/OT recommending skilled nursing facility placement and  working on choice of skilled facility with patient and family.          Paroxysmal atrial fibrillation    Patient back in sinus rhythm. Patient's CHADsVASC score is 6 so needs long term anticoagulation and patient started on Apixiban and needs indefinite treatment. Will continue Coreg 12.5 mg po BID for rate control.           Essential hypertension    Patient's blood pressure elevated  in past 24 hours. Goal for blood pressure is SBP < 150 and DBP < 90 as patient > or = 60 years of age with no diabetes or advanced kidney disease based on JNC 8 guidelines. Continue current treatment regimen of Coreg 12.5 mg po BID that was started in MICU and will consider increasing dose tomorrow if BP remains above goal. Plan to continue to monitor patient's blood pressure routinely while patient is hospitalized.           Partial  symptomatic epilepsy with complex partial seizures, not intractable, without status epilepticus    Controlled. Continue Keppra and Phenobarbital to treat.         Vascular dementia without behavioral disturbance    Controlled. Continue Namenda to treat.         Osteoporosis, senile    Patient admitted with hip fracture related to osteoporosis. Patient has been advised to avoid smoking and moderation of alcohol use with regular weightbearing exercise to help reduce risk of future fractures associated with osteoporosis. Will check Vitamin D level and if low will replace with Vitamin D 50,000 units po weekly for 6-8 weeks and recheck level as outpatient. Patient will need outpatient DEXA scan and further evaluation for additional treatment of osteoporosis. Will refer patient to [facility] on discharge for further evaluation and management of osteoporosis.            Acquired hypothyroidism    Controlled. Continue Synthroid at 137 mcg po daily to treat.               Anticipated Disposition: Skilled Nursing Facility    Time spent in care of patient (Greater than 1/2 spent in direct face-to-face contact): 25 minutes      Falguni Magana MD  Department of Hospital Medicine   [facility]

## 2017-07-06 NOTE — ASSESSMENT & PLAN NOTE
Encounter for aftercare for healing closed traumatic fracture of hip  Patient is POD #2. Patient reports minimal pain in right hip. Plan is to continue PT/OT for gait training and strengthening and restoration of ADL's. Patient is FWB/WBAT: right lower extremity as per Orthopedic recommendations. Patient on Apixiban for treatment of PE so no Lovenox needed for DVT prophylaxis. Orthopedics is following and managing surgical site. Pain is well controlled with perineural pain catheter in place as per Anesthesia pain service. PT/OT recommending skilled nursing facility placement and  working on choice of skilled facility with patient and family.

## 2017-07-06 NOTE — PT/OT/SLP PROGRESS
Occupational Therapy  Treatment    Virgil Melo Jr.   MRN: [id number]   Admitting Diagnosis: Closed intertrochanteric fracture of right femur    OT Date of Treatment: 07/06/17   OT Start Time: 1115  OT Stop Time: 1140  OT Total Time (min): 25 min    Billable Minutes:  Self Care/Home Management 15 and Therapeutic Activity 10    General Precautions: Standard, fall  Orthopedic Precautions: RLE weight bearing as tolerated    Do you have any cultural, spiritual, Confucianism conflicts, given your current situation?: None    Subjective:  Communicated with RN prior to session.    Pt agreeable to treatment    Pain/Comfort  Pain Rating 1: 0/10    Objective:  Patient found with: perineural catheter, FCD     Functional Mobility:  Bed Mobility:  Scooting/Bridging: Maximum Assistance  Supine to Sit: Maximum Assistance    Transfers:   Sit <> Stand Assistance: Maximum Assistance  Sit <> Stand Assistive Device: No Assistive Device    Bed <> Chair Technique: Stand Pivot  Bed <> Chair Transfer Assistance: Maximum Assistance  Bed <> Chair Assistive Device: Rolling Walker    Functional Ambulation: MaxA with RW 5 steps to bedside chair    Activities of Daily Living:  UE Dressing Level of Assistance: Moderate assistance    LE Dressing Level of Assistance: Total assistance    Grooming Position: Seated, EOB  Grooming Level of Assistance: Contact guard assistance   Toileting Where Assessed: Bed level  Toileting Level of Assistance: Total assistance  Total A for pericare while standing    Balance:   Static Sit: FAIR-: Maintains without assist but inconsistent   Dynamic Sit: FAIR-: Cannot move trunk without losing balance  Static Stand: 0: Needs MAXIMAL assist to maintain   Dynamic stand: POOR: N/A    AM-PAC 6 CLICK ADL   How much help from another person does this patient currently need?   1 = Unable, Total/Dependent Assistance  2 = A lot, Maximum/Moderate Assistance  3 = A little, Minimum/Contact Guard/Supervision  4 = None, Modified  "[place]/Independent    Putting on and taking off regular lower body clothing? : 1  Bathing (including washing, rinsing, drying)?: 1  Toileting, which includes using toilet, bedpan, or urinal? : 1  Putting on and taking off regular upper body clothing?: 2  Taking care of personal grooming such as brushing teeth?: 3  Eating meals?: 4  Total Score: 12     AM-PAC Raw Score CMS "G-Code Modifier Level of Impairment Assistance   6 % Total / Unable   7 - 8 CM 80 - 100% Maximal Assist   9-13 CL 60 - 80% Moderate Assist   14 - 19 CK 40 - 60% Moderate Assist   20 - 22 CJ 20 - 40% Minimal Assist   23 CI 1-20% SBA / CGA   24 CH 0% Independent/ Mod I       Patient left up in chair with all lines intact, call button in reach and RN notified    ASSESSMENT:  Virgil Melo Jr. is a 88 y.o. male with a medical diagnosis of Closed intertrochanteric fracture of right femur and tolerated session well with good participation and would continue to benefit from OT services to maximize functional (I) and safety.    Rehab identified problem list/impairments: Rehab identified problem list/impairments: weakness, impaired endurance, impaired self care skills, impaired functional mobilty, gait instability, impaired balance, decreased lower extremity function, decreased safety awareness, decreased ROM, edema, orthopedic precautions, pain, impaired skin    Rehab potential is good.    Activity tolerance: Good    Discharge recommendations: Discharge Facility/Level Of Care Needs: nursing facility, skilled     Barriers to discharge: Barriers to Discharge: None    Equipment recommendations: bedside commode, bath bench, walker, rolling     GOALS:    Occupational Therapy Goals        Problem: Occupational Therapy Goal    Goal Priority Disciplines Outcome Interventions   Occupational Therapy Goal     OT, PT/OT     Description:  Goals to be met by: 7/19/17     Patient will increase functional independence with ADLs by performing:    UE " Dressing with Moderate Assistance.  LE Dressing with Moderate Assistance.  Grooming while seated with Minimal Assistance.  Toileting from bedside commode with Moderate Assistance for hygiene and clothing management.   Bathing from  edge of bed with Moderate Assistance.  Toilet transfer to bedside commode with Moderate Assistance.  Increased functional strength to WFL for B UE.  Upper extremity exercise program x15 reps per handout, with assistance as needed.                      Plan:  Patient to be seen 6 x/week to address the above listed problems via self-care/home management, therapeutic activities, therapeutic exercises  Plan of Care expires: 07/19/17  Plan of Care reviewed with: patient         GUILLERMO Prasad  07/06/2017

## 2017-07-06 NOTE — PLAN OF CARE
9:05AM  SW called pt's daughter, Marta to indicate her list of preferences for SNF. Daughter stated choices are as follows: 1. [facility] 2. [facility] 3. [facility]. Will fax referrals to facilities.     Shyanne Veliz, VICKIE    [phone]

## 2017-07-06 NOTE — PT/OT/SLP PROGRESS
"Physical Therapy  Treatment    Virgil Melo Jr.   MRN: [id number]   Admitting Diagnosis: Closed intertrochanteric fracture of right femur    PT Received On: 07/06/17  PT Start Time: 1116     PT Stop Time: 1139    PT Total Time (min): 23 min       Billable Minutes:  Therapeutic Activity 13 and Therapeutic Exercise 10    Treatment Type: Treatment  PT/PTA: PT             General Precautions: Standard, fall  Orthopedic Precautions: RLE weight bearing as tolerated   Braces: N/A         Subjective:  Communicated with RN prior to session, pt ok to treat  Pt reported doing well today, consistently reported having a "bad leg" that would prevent him from participation. Pt reluctant to ambulate due to perceived weakness and fatigue but reports he does want to get better and back to walking.  Pt did display some depressed behavior saying he made the mistake of "living too long" but overall seemed grateful for PT working to help him improve.     Pain/Comfort  Pt did not report a numerical pain rating during session but did demonstrate pain behavior secondary to fatigue while standing.     Objective:   Patient found with: PCA   Pt supine in bed, wedge slightly under R side. Daughter, wife and granddaughter present      Functional Mobility:  Bed Mobility:   Supine to Sit: Maximum Assistance   Pt required max A at trunk when coming from supine to sit, mod A at R LE to move to EOB. Pt able to help with LLE and (B)UE. Pt able to maintain seated balance for ~5 minutes while OT assisted pt to clean his face, comb his hair and don second gown. Pt able to maintain seated balance with CGA, occ min A due to LOB posteriorly when using (B)UE for activities. Pt able to maintain seated balance SBA when (b)UE assisted on bed.    Transfers:  Sit <> Stand Assistance: Maximum Assistance  Sit <> Stand Assistive Device: Rolling Walker  Bed <> Chair Technique: Stand Pivot  Bed <> Chair Assistance: Maximum Assistance  Bed <> Chair Assistive Device: " Rolling Walker   Pt performed x2 sit<>stand trials. First trial required maxA x2 with PTs on both sides, pt holding onto their arms with max A at posterior hip to assist coming to stand. Pt unsteady in standing, requiring RW anteriorly and continued x2max A at posterior hips and blocking (B) knee. Pt able to correct posture to upright/head up position when cued. Pt had to sit secondary to fatigue and R knee flexion and IR decreasing stability. Initial trial maintained for 90 seconds. Second trial to stand-pivot to chair only required max Ax1 with Rw. Pt more active in t/f, realizing his ability to assist with pushing off and support partial weight on walker. Pt maintained standing ~20 seconds then performed stand pivot t/f to chair with continued max A, Rw. Pt demo'd inability to control quads eccentrically, lowering into chair max A by PT. Throughout standing session, support required on walker due to pt attempting to pull walker posterior rather than push into it, max cueing required to fit technique, some improvements noted throughout session.      Therapeutic Activities and Exercises:   Pt performed the following therapeutic exercises while seated in bedside chair. Required supervision, (B)UE support on chair for balance.   1. Seated marches, 2x 10 reps (B), min A required for assisting RLE. Tactile cues and demonstration required for pt understanding of exercise.   2. LAQs: 2x 10 reps (b), min-mod A required at RLE due to weakness.        Pt performed eccentric lowering of RLE from knee extended position with PT support x2  3. Ankle pumps: 2x 10   Pt verbalized understanding of importance of exercise to return to function and ambulation.      AM-PAC 6 CLICK MOBILITY  How much help from another person does this patient currently need?   1 = Unable, Total/Dependent Assistance  2 = A lot, Maximum/Moderate Assistance  3 = A little, Minimum/Contact Guard/Supervision  4 = None, Modified  [place]/Independent    Turning over in bed (including adjusting bedclothes, sheets and blankets)?: 3  Sitting down on and standing up from a chair with arms (e.g., wheelchair, bedside commode, etc.): 2  Moving from lying on back to sitting on the side of the bed?: 2  Moving to and from a bed to a chair (including a wheelchair)?: 2  Need to walk in hospital room?: 2  Climbing 3-5 steps with a railing?: 1  Total Score: 12    AM-PAC Raw Score CMS G-Code Modifier Level of Impairment Assistance   6 % Total / Unable   7 - 9 CM 80 - 100% Maximal Assist   10 - 14 CL 60 - 80% Moderate Assist   15 - 19 CK 40 - 60% Moderate Assist   20 - 22 CJ 20 - 40% Minimal Assist   23 CI 1-20% SBA / CGA   24 CH 0% Independent/ Mod I     Patient left up in chair with all lines intact, call button in reach and daughter present.    Assessment:  Virgil Melo Jr. is a 88 y.o. male with a medical diagnosis of Closed intertrochanteric fracture of right femur and presents with impaired functional mobility secondary to weakness and decreased endurance. Pt was able to tolerate 90 seconds of standing max Ax2 today before requiring a rest break. Pt participated fully in therapeutic exercises but required increased time and cueing due to baseline dementia. Pt appears motivated to participate in PT with consistent reports of wanting to walk but primarily limited by fatigue. Pt will continue to benefit from acute PT services to improve strength, endurance, and independence in functional mobility.     Rehab identified problem list/impairments: Rehab identified problem list/impairments: weakness, impaired endurance, impaired functional mobilty, impaired cognition, impaired balance, gait instability, decreased lower extremity function, decreased safety awareness, decreased ROM    Rehab potential is fair.    Activity tolerance: Fair    Discharge recommendations: skilled nursing facility     Barriers to discharge: None    Equipment  recommendations:  3-in-1 commode, walker, rolling     GOALS:    Physical Therapy Goals        Problem: Physical Therapy Goal    Goal Priority Disciplines Outcome Goal Variances Interventions   Physical Therapy Goal     PT/OT, PT Ongoing (interventions implemented as appropriate)     Description:  Goals to be met by: 2017     Patient will increase functional independence with mobility by performin. Supine to sit with Moderate Assistance  2. Sit to supine with Moderate Assistance  3. Rolling to Left and Right with Minimum Assistance.  4. Sit to stand transfer with Moderate Assistance  5. Bed to chair transfer with Moderate Assistance using Rolling Walker  6. Gait  x 10 feet with Moderate Assistance using Rolling Walker.   7. Lower extremity exercise program x 15 reps per handout, with assistance as needed (not issued upon eval) MET                        PLAN:    Patient to be seen daily  to address the above listed problems via gait training, therapeutic activities, therapeutic exercises  Plan of Care expires: 17  Plan of Care reviewed with: patient, daughter         Rosa Santiago, [facility] [place]  2017

## 2017-07-06 NOTE — PLAN OF CARE
Problem: Patient Care Overview  Goal: Plan of Care Review  Virgil Melo Jr. is a 88 y.o. male with a medical diagnosis of Closed intertrochanteric fracture of right femur and presents with impaired functional mobility secondary to weakness and decreased endurance. Pt was able to tolerate 90 seconds of standing max Ax2 today before requiring a rest break. Pt participated fully in therapeutic exercises but required increased time and cueing due to baseline dementia. Pt appears motivated to participate in PT with consistent reports of wanting to walk but primarily limited by fatigue. Pt will continue to benefit from acute PT services to improve strength, endurance, and independence in functional mobility.     Rosa Santiago, ADELAIDA  7/6/2017

## 2017-07-06 NOTE — ASSESSMENT & PLAN NOTE
Vitamin D deficiency  Patient admitted with hip fracture related to osteoporosis. Patient has been advised to avoid smoking and moderation of alcohol use with regular weightbearing exercise to help reduce risk of future fractures associated with osteoporosis. Vitamin D level is low at 16 consistent with moderate deficiency and will replace with Vitamin D 50,000 units po weekly and recheck level as outpatient in 6-8 weeks. Patient will need outpatient DEXA scan and further evaluation for additional treatment of osteoporosis. Will refer patient to [facility] on discharge for further evaluation and management of osteoporosis.

## 2017-07-06 NOTE — PROGRESS NOTES
ORTHO PROGRESS NOTE:    Virgil Melo Jr. is a 88 y.o. male admitted on 7/1/2017  Hospital Day: 5      The patient was seen and examined this morning at the bedside. No acute issues overnight and adequate control of pain on current regimen.    Patient worked with physical therapy over the last 24 hours and was able to stand with a walker.    PHYSICAL EXAM:  Awake/alert/oriented x 3  No acute distress  AFVSS (mild HTN)  Good inspiratory effort with unlabored breathing; stable on RA.    RLE : dressings c/d/i. NVI distally.    Vitals:    07/05/17 1608 07/05/17 2001 07/05/17 2300 07/06/17 0413   BP: (!) 177/82 (!) 178/83  (!) 175/84   BP Location: Right arm Right arm  Right arm   Patient Position: Sitting Lying  Lying   BP Method: Automatic Automatic  Automatic   Pulse: 76 79 83 70   Resp: 20 20  18   Temp: 98.4 °F (36.9 °C) 98.2 °F (36.8 °C)  98 °F (36.7 °C)   TempSrc: Oral Oral  Oral   SpO2: (!) 92% (!) 91%  (!) 94%   Weight:       Height:         I/O last 3 completed shifts:  In: 1826.8 [P.O.:1510; I.V.:266.8; IV Piggyback:50]  Out: 2005 [Urine:2005]    Recent Labs  Lab 07/04/17  0345 07/05/17  0409   CALCIUM 7.9*  7.9* 8.4*   PROT 6.5 6.5     138 137   K 4.3  4.3 3.6   CO2 19*  19* 20*     109 107   BUN 23  23 21   CREATININE 0.9  0.9 0.9       Recent Labs  Lab 07/04/17  1752 07/05/17  0757 07/05/17  1854   WBC 10.49 10.61 12.98*   RBC 3.52* 3.79* 3.79*   HGB 10.6* 11.5* 11.6*   HCT 32.7* 34.7* 34.9*   * 144* 147*       Recent Labs  Lab 07/04/17  0345   APTT 82.7*       A/P: 88 y.o. male 3 Days Post-Op s/p IMN of right intertrochanteric femur fracture  -- Pain control  -- PT/OT: WBAT  -- DVT prophylaxis: Eliquis for PE. IVC filter in place.    Mike Casale, M.D. PGY-4  Department of Orthopedic Surgery

## 2017-07-06 NOTE — ADDENDUM NOTE
Addendum  created 07/06/17 1253 by Latonya Stockton MD    Charge Capture section accepted, Sign clinical note

## 2017-07-07 PROBLEM — J96.01 ACUTE RESPIRATORY FAILURE WITH HYPOXIA: Status: RESOLVED | Noted: 2017-01-01 | Resolved: 2017-01-01

## 2017-07-07 PROBLEM — R26.81 UNSTEADY GAIT: Status: RESOLVED | Noted: 2017-01-01 | Resolved: 2017-01-01

## 2017-07-07 NOTE — ASSESSMENT & PLAN NOTE
Patient's blood pressure controlled here in the hospital. Goal for blood pressure is SBP < 150 and DBP < 90 as patient > or = 60 years of age with no diabetes or advanced kidney disease based on JNC 8 guidelines. Plan to continue current treatment regimen of Coreg 25 mg po BID that was started in MICU on discharge and discontinue Toprol XL patient was on as outpatient.

## 2017-07-07 NOTE — ASSESSMENT & PLAN NOTE
Encounter for aftercare for healing closed traumatic fracture of hip  Patient is POD # 4. Patient reports minimal pain in right hip. Plan is to continue PT/OT for gait training and strengthening and restoration of ADL's on discharge to [facility] unit today. Patient is FWB/WBAT: right lower extremity as per Orthopedic recommendations. Patient on Apixiban for treatment of PE so no Lovenox needed for DVT prophylaxis. Orthopedics is following and managing surgical site. Pain is well controlled. Perineural catheter removed by Anesthesia today as patient being discharged.

## 2017-07-07 NOTE — PROGRESS NOTES
Per primary team, patient ready for discharge. Catheter removed; blue tip intact.    Maria Teresa Nevarez MD, PGY-4  [facility]

## 2017-07-07 NOTE — ASSESSMENT & PLAN NOTE
Vitamin D deficiency  Patient admitted with hip fracture related to osteoporosis. Patient has been advised to avoid smoking and moderation of alcohol use with regular weightbearing exercise to help reduce risk of future fractures associated with osteoporosis. Vitamin D level is low at 16 consistent with moderate deficiency and started on Vitamin D replacement at 50,000 units po weekly and will continue on discharge and recheck level as outpatient in 6-8 weeks. Patient will need outpatient DEXA scan and further evaluation for additional treatment of osteoporosis. Will refer patient to Orthopedic Fracture Clinic on discharge for further evaluation and management of osteoporosis.

## 2017-07-07 NOTE — PROGRESS NOTES
Nicole Barnhart    Notified  That  The  Pt  Has  Not  Urinated  For  The  Last  8  Hrs .  Nicole Barnhart  orderd  Pt   To   Be   Bladder  Scanned  And  If   The   Pt's   Scan  Is   Greater  Than  300  Then  The  Pt   Can  Be  In  And  Out   Cath  .  0700   900cc  Of urine   Obtained   From  The   Pt  beig   Straight  Catheterized.. Pt's  Urine  Concentrated  And   Gabbie.

## 2017-07-07 NOTE — PROGRESS NOTES
[facility] Hospital Medicine  Progress Note    Patient Name: Virgil Melo Jr.  MRN: [id number]  [facility] Medicine Service: H  Admission Date: 7/1/2017  Length of Stay: 6 days  Expected Discharge Date: 7/7/2017  Attending Physician: Falguni Magana MD  Primary Care Provider: Luis A Milton MD    Subjective:     4 Days Post-Op from right hip cephalomedullary nail fixation for fracture     Orthopedic Surgeon: Dr. Branden Gonzales  Weight Bearing Status: WBAT right lower extremity    Follow-up Visit For: Closed intertrochanteric fracture of right femur    HPI:  88 M with a PMH of Epilepsy since childhood ( on keppra, phenobarbital and gabapentin and followed by neurology at [facility] ), dementia,  HTN, Hypothyroidism, aortic stenosis  S/P bioprosthetic AV replacement more than ten years ago presents as a transfer from [facility] for further evaluation and management of right hip fracture. Patient is a poor informant given h/o dementia. Daughter at bedside provided HPI. Patient lives at assisted living facility and ambulates with cane due to unsteady gait. He had an unwitnessed fall inside his room Friday evening (06/30 ) around 6 pm while taking off his pants. Daughter states she thinks patient might have lost balance while undressing and fell to the floor on his buttock. Nursing staff found patient on the floor on his buttock and complaining of right hip pain, notified daughter and sent him to [facility] via EMS. At [facility] imaging including CT pelvis showed intertrochanteric right hip fracture and new onset Afib. Patient's insurance not accepted by [facility] and thus transferred to [facility]. Daughter states since her mother passed away couple of years ago she has been managing his medications and he has been taking only the following meds : keppra, phenobarbital, gabapentin and namenda.      During my interview patient is awake, but confused. Oriented to person only and complaining of right hip pain. Morphine 4 mg IV x 1 given  with improved pain. O2 sat in mid 80s on 5 liter N/C. Elevated troponin 0.248, no chest pain, STAT EKG showed afib with RVR ( HR ~ 110 ). HR improved to 80s after lopressor 5 mg IVP. O2 sat improved to low 90s on NRB mask. Cardiology was consulted for pre op evaluation given elevated troponin, afib W RVR and hypoxia. STAT CTA chest showed extensive pulmonary saddle embolus, involving all lobes with associated right heart strain. CT head negative for bleeding. CCS was consulted and patient was transferred to ICU after CTA showed saddle embolus for close monitoring of hemodynamic/respiratory status.       No new subjective & objective note has been filed under this hospital service since the last note was generated.    ASSESSMENT/PLAN:     Acute saddle pulmonary embolism without acute cor pulmonale    Acute respiratory failure with hypoxia  Improved. Respiratory failure resolved. Patient noted on admit to have acute saddle embolus present on admit with acute hypoxic respiratory failure due to PE. Patient was initailly on NRB on admit and then titrated to high flow oxygen in MICU. Patient started on IV Heparin drip for treatment of PE and had IVC filter placed on 7/3 so patient could undergo operative fixation of right hip fracture. Patient remains on IV Heparin drip post-op. Respiratory failure resolved and patient now on room air and oxygen sats 92% and goal is to keep oxygen sats > 90%. IV Heparin drip discontinued on 7/5 and patient transitioned to oral Apixiban for long term anticoagulation for both atrial fibrillation and treatment of acute PE. Patient started on Apixiban 10 mg po BID for 7 days on 7/5 with end date of 7/12 and then 5 mg po BID starting on 7/13 for indefinite treatment of both PE and stroke prophylaxis for atrial fibrillation.           * Closed intertrochanteric fracture of right femur s/p IMN on 7/3/17    Encounter for aftercare for healing closed traumatic fracture of hip  Patient is POD # 4.  Patient reports minimal pain in right hip. Plan is to continue PT/OT for gait training and strengthening and restoration of ADL's on discharge to [facility] unit today. Patient is FWB/WBAT: right lower extremity as per Orthopedic recommendations. Patient on Apixiban for treatment of PE so no Lovenox needed for DVT prophylaxis. Orthopedics is following and managing surgical site. Pain is well controlled. Perineural catheter removed by Anesthesia today as patient being discharged.         Paroxysmal atrial fibrillation    Patient back in sinus rhythm and rate controlled. Patient's CHADsVASC score is 6 so needs long term anticoagulation and patient started on Apixiban in hospital and needs indefinite treatment. Patient started on Coreg in hospital and needs to continue Coreg 25 mg po BID for rate control on discharge. Patient on Toprol XL as outpatient this is being discontinued on discharge.          Essential hypertension    Patient's blood pressure controlled here in the hospital. Goal for blood pressure is SBP < 150 and DBP < 90 as patient > or = 60 years of age with no diabetes or advanced kidney disease based on JNC 8 guidelines. Plan to continue current treatment regimen of Coreg 25 mg po BID that was started in MICU on discharge and discontinue Toprol XL patient was on as outpatient.          Partial symptomatic epilepsy with complex partial seizures, not intractable, without status epilepticus    Controlled in hospital with no seizures on home meds and will continue home meds of Keppra and Phenobarbital on discharge.        Vascular dementia without behavioral disturbance    Controlled. Continue Namenda to treat on discharge. Patient at cognitive baseline on discharge.         Osteoporosis, senile    Vitamin D deficiency  Patient admitted with hip fracture related to osteoporosis. Patient has been advised to avoid smoking and moderation of alcohol use with regular weightbearing exercise to help  reduce risk of future fractures associated with osteoporosis. Vitamin D level is low at 16 consistent with moderate deficiency and started on Vitamin D replacement at 50,000 units po weekly and will continue on discharge and recheck level as outpatient in 6-8 weeks. Patient will need outpatient DEXA scan and further evaluation for additional treatment of osteoporosis. Will refer patient to Orthopedic Fracture Clinic on discharge for further evaluation and management of osteoporosis.            Acquired hypothyroidism    Controlled. Continue Synthroid at 137 mcg po daily to treat on discharge as taking previously.               DISCHARGE PLANNING:  Future Appointments  Date Time Provider Department Center   7/17/2017 10:15 AM Diana Martines PA-C [facility] ORTHO [facility]       Discharge Disposition: [facility] Skilled Nursing Unit    TIME SPENT: I spent 36 minutes evaluating, treating, counseling, and coordinating care for the patient for discharge.     Falguni Magana MD  Department of Hospital Medicine   [facility]

## 2017-07-07 NOTE — PLAN OF CARE
[facility]     Department of Hospital Medicine     [address] [place] [address]     [phone] after hours  [phone] fax       NURSING HOME ORDERS    07/07/2017    Admit to [facility]:  Skilled Bed                                            Diagnoses:  Active Hospital Problems    Diagnosis  POA    *Closed intertrochanteric fracture of right femur s/p IMN on 7/3/17 [S72.141A]  Yes     Priority: 2     Acute saddle pulmonary embolism without acute cor pulmonale [I26.92]  Yes     Priority: 1 - High    Paroxysmal atrial fibrillation [I48.0]  Yes     Priority: 3     Essential hypertension [I10]  Yes     Priority: 4     Partial symptomatic epilepsy with complex partial seizures, not intractable, without status epilepticus [G40.209]  Yes     Priority: 5     Vascular dementia without behavioral disturbance [F01.50]  Yes     Priority: 6     Osteoporosis, senile [M81.0]  Yes     Priority: 7     Acquired hypothyroidism [E03.9]  Yes     Priority: 8     Vitamin D deficiency disease [E55.9]  Yes    Encounter for aftercare for healing closed traumatic fracture of hip [S72.009D]  Not Applicable    Unsteady gait [R26.81]  Yes      Resolved Hospital Problems    Diagnosis Date Resolved POA    Closed intertrochanteric fracture of femur [S72.143A] 07/05/2017 Yes    Femur fracture, right [S72.91XA] 07/05/2017 Yes    Acute respiratory failure with hypoxia [J96.01] 07/07/2017 Yes    Tachycardia [R00.0] 07/05/2017 Yes       Allergies:  Review of patient's allergies indicates:   Allergen Reactions    Penicillins Hives     Other reaction(s): Hives       Vitals: Every shift (Skilled Nursing patients)        Code Status: Partial Code     Diet: regular diet     Activities:   - Up in a chair each morning as tolerated   - Ambulate with assistance to bathroom   - May use walker, cane, or self-propelled wheelchair   - Weight bearing: FWB/WBAT: right lower  extremity    LABS:  Per facility protocol   Phenobarbital level in 1 month and every 3 months    Nursing Precautions:            - Fall precautions per nursing home protocol      CONSULTS:     Physical Therapy to evaluate and treat 5 times a week     Occupational Therapy to evaluate and treat 5 times a week      WOUND CARE ORDERS  Keep surgical bandage in place until Orthopedic clinic folllow-up on 7/17/2017 with LEANA Ramirez             Medications:     acetaminophen (TYLENOL) 325 MG tablet  Take 2 tablets (650 mg total) by mouth every 6 (six) hours as needed (Mild to moderate pain).     apixaban 5 mg Tab  Take 2 tablets (10mg) by mouth twice daily for 5 more days with end date of 7/12/2017 then decrease dose to 1 tablet (5mg) my mouth twice daily on 7/13/2017 and continue indefinitely for treatment of PE and also for stroke prevention for atrial fibrillation.     aspirin (ECOTRIN) 325 MG EC tablet  Take 1 tablet by mouth Daily.     atorvastatin (LIPITOR) 40 MG tablet  TAKE 1 TABLET BY MOUTH EVERY DAY     calcium carbonate (OS-ROSA) 500 mg calcium (1,250 mg) tablet  Take 2 tablets (1,000 mg total) by mouth once daily.     carvedilol (COREG) 25 MG tablet  Take 1 tablet (25 mg total) by mouth 2 (two) times daily.     ergocalciferol (ERGOCALCIFEROL) 50,000 unit Cap  Take 1 capsule (50,000 Units total) by mouth Every Friday.     gabapentin (NEURONTIN) 800 MG tablet  Take 1 tablet (800 mg total) by mouth 3 (three) times daily.     hydrocodone-acetaminophen 5-325mg (NORCO) 5-325 mg per tablet  Take 1 tablet by mouth every 4 (four) hours as needed for Pain.     levetiracetam (KEPPRA) 750 MG Tab  Take 1 tablet (750 mg total) by mouth 2 (two) times daily.     levothyroxine (SYNTHROID) 137 MCG Tab tablet  TAKE 1 TABLET BY MOUTH before breakfast.     memantine (NAMENDA) 10 MG Tab  1 tablet by mouth twice daily.     phenobarbital (LUMINAL) 32.4 MG tablet  Take 1 tablet (32.4 mg total) by mouth 3 (three) times daily.      senna-docusate 8.6-50 mg (PERICOLACE) 8.6-50 mg per tablet  Take 1 tablet by mouth 2 (two) times daily as needed for Constipation.            Follow-up:   Future Appointments  Date Time Provider Department Center   7/17/2017 10:15 AM Diana Martines PA-C [facility] THOM Torre stephanie       _________________________________  Falguni Magana MD  07/07/2017

## 2017-07-07 NOTE — ASSESSMENT & PLAN NOTE
Acute respiratory failure with hypoxia  Improved. Respiratory failure resolved. Patient noted on admit to have acute saddle embolus present on admit with acute hypoxic respiratory failure due to PE. Patient was initailly on NRB on admit and then titrated to high flow oxygen in MICU. Patient started on IV Heparin drip for treatment of PE and had IVC filter placed on 7/3 so patient could undergo operative fixation of right hip fracture. Patient remains on IV Heparin drip post-op. Respiratory failure resolved and patient now on room air and oxygen sats 92% and goal is to keep oxygen sats > 90%. IV Heparin drip discontinued on 7/5 and patient transitioned to oral Apixiban for long term anticoagulation for both atrial fibrillation and treatment of acute PE. Patient started on Apixiban 10 mg po BID for 7 days on 7/5 with end date of 7/12 and then 5 mg po BID starting on 7/13 for indefinite treatment of both PE and stroke prophylaxis for atrial fibrillation.

## 2017-07-07 NOTE — PLAN OF CARE
11:04 AM  SW received a call from admissions with [facility]'s who stated admissions coordinator, Jovana is out sick today and could not make evaluation today. So therefore, they would have to wait until Monday to accept pt. Contacted pt's second choice, [facility] and left voicemail with [facility]. Also followed up with third choice,  Shanika with [facility].     Shyanne Veliz, VICKIE    [phone]

## 2017-07-07 NOTE — PROGRESS NOTES
CM uploaded last BM documentation and prescriptions to [facility] and sent to [facility] per their request.  CM will continue to follow up.    Nydia Gruber RN, CM [facility]  [phone]

## 2017-07-07 NOTE — ASSESSMENT & PLAN NOTE
Patient back in sinus rhythm and rate controlled. Patient's CHADsVASC score is 6 so needs long term anticoagulation and patient started on Apixiban in hospital and needs indefinite treatment. Patient started on Coreg in hospital and needs to continue Coreg 25 mg po BID for rate control on discharge. Patient on Toprol XL as outpatient this is being discontinued on discharge.

## 2017-07-07 NOTE — PROGRESS NOTES
Tried for about 30 minutes to give report to Nurse resposible at [facility] at [phone] (84 and 86 and 88) w

## 2017-07-07 NOTE — CONSULTS
Wound care consult for DTI to sacral area.   The sacral DTI was noted on admission, maroon- purple coloration.  Barrier paste is being used on the sacral skin to protect the skin and dry the area. Verbal permission to photograph and assess wounds obtained from the patient.  Wound care discussed with patient and verbal understanding noted.      07/07/17 1400       Pressure Ulcer 07/01/17 2020 midline buttocks suspected deep tissue injury   Date First Assessed/Time First Assessed: 07/01/17 2020   Pressure Ulcer Present on Admission: yes  Orientation: midline  Location: buttocks  Staging: suspected deep tissue injury   Staging suspected deep tissue injury   Healing Pressure Ulcer yes   Pressure Ulcer Risk Factors moisture;mobility;nutrition;shear/friction   Dressing Appearance no dressing   Drainage Amount none   Appearance maroon;reddened   Periwound Area redness   Wound Edges intact   Wound Length (cm) 9   Wound Width (cm) 5   Interventions barrier applied   Skin Interventions   Device Skin Pressure Protection pressure points protected   Pressure Reduction Techniques heels elevated off bed;positioned off wounds;weight shift assistance provided     Sacral/coccyx/buttocks      Recommendations:  Critic aid barrier paste BID and prn cleaning.   ELLIOT overlay mattress  Heel protectors.

## 2017-07-07 NOTE — PROGRESS NOTES
ORTHO PROGRESS NOTE:    Virgil Melo Jr. is a 88 y.o. male admitted on 7/1/2017  Hospital Day: 6      The patient was seen and examined this morning at the bedside. No acute issues overnight and adequate control of pain on current regimen.      Having issues with urinary retention.    Patient worked with physical therapy over the last 24 hours and was able to stand for 90 seconds.    PHYSICAL EXAM:  Awake/alert/oriented x 3  No acute distress  AFVSS (mild HTN)  Good inspiratory effort with unlabored breathing; stable on RA.    RLE : dressings c/d/i. NVI distally.    Vitals:    07/07/17 0300 07/07/17 0400 07/07/17 0700 07/07/17 0827   BP:  (!) 175/81  (!) 171/78   BP Location:       Patient Position:       BP Method:       Pulse: 63 65 66 64   Resp:  16     Temp:  97.7 °F (36.5 °C)     TempSrc:  Oral     SpO2:  97%     Weight:       Height:         I/O last 3 completed shifts:  In: 736 [P.O.:640; I.V.:96]  Out: 900 [Urine:900]    Recent Labs  Lab 07/05/17  0409 07/06/17  0515   CALCIUM 8.4* 8.4*   PROT 6.5 6.3    138   K 3.6 4.8   CO2 20* 20*    109   BUN 21 23   CREATININE 0.9 1.0       Recent Labs  Lab 07/06/17  0830 07/06/17  1813 07/07/17  0837   WBC 10.80 11.86 10.95   RBC 3.85* 4.05* 3.80*   HGB 11.6* 12.3* 11.3*   HCT 35.9* 36.9* 35.0*    201 180     No results for input(s): INR, APTT in the last 72 hours.    Invalid input(s): PT    A/P: 88 y.o. male 4 Days Post-Op s/p IMN of right intertrochanteric femur fracture  -- Pain control  -- PT/OT: WBAT  -- DVT prophylaxis: Eliquis for PE. IVC filter in place.    Mike Casale, M.D. PGY-4  Department of Orthopedic Surgery    Attg Note:  I agree with the above assessment and plan.    Branden Gonzales MD

## 2017-07-07 NOTE — ANESTHESIA POST-OP PAIN MANAGEMENT
Acute Pain Service Progress Note    Virgil Melo Jr. is a 88 y.o., male, [id number] with PMHx of HTN, HLD, PVD, BPH, hypothyroidism, a fib, AS (s/p AVR), partial epilepsy, PE (s/p IVC filter placement), and OA who is s/p IM nailing of R femur 7/3/17    Surgery:  IM Nailing of Femur- Right    Post Op Day #: 4    Catheter type: perineural  Suprainguinal Fascia Iliaca     Infusion type: Ropivacaine 0.2%  8 mL/hr     Problem List:    Active Hospital Problems    Diagnosis  POA    *Closed intertrochanteric fracture of right femur s/p IMN on 7/3/17 [S72.141A]  Yes    Vitamin D deficiency disease [E55.9]  Yes    Encounter for aftercare for healing closed traumatic fracture of hip [S72.009D]  Not Applicable    Acute saddle pulmonary embolism without acute cor pulmonale [I26.92]  Yes    Paroxysmal atrial fibrillation [I48.0]  Yes    Unsteady gait [R26.81]  Yes    Acute respiratory failure with hypoxia [J96.01]  Yes    Acquired hypothyroidism [E03.9]  Yes    Osteoporosis, senile [M81.0]  Yes    Vascular dementia without behavioral disturbance [F01.50]  Yes    Essential hypertension [I10]  Yes    Partial symptomatic epilepsy with complex partial seizures, not intractable, without status epilepticus [G40.209]  Yes      Resolved Hospital Problems    Diagnosis Date Resolved POA    Closed intertrochanteric fracture of femur [S72.143A] 07/05/2017 Yes    Femur fracture, right [S72.91XA] 07/05/2017 Yes    Tachycardia [R00.0] 07/05/2017 Yes       Subjective:     General appearance of alert, oriented, no complaints   Pain with rest: 1    Numbers   Pain with movement: 1    Numbers   Side Effects    1. Pruritis No    2. Nausea No    3. Motor Blockade No, 0=Ability to raise lower extremities off bed    4. Sedation No, 1=awake and alert    Objective:     Catheter site clean, dry, intact        Vitals   Vitals:    07/07/17 0827   BP: (!) 171/78   Pulse: 64   Resp:    Temp:         Labs    Admission on 07/01/2017   No results  displayed because visit has over 200 results.           Meds   Current Facility-Administered Medications   Medication Dose Route Frequency Provider Last Rate Last Dose    0.9%  NaCl infusion (for blood administration)   Intravenous Q24H PRN Michael Joseph Casale, MD        acetaminophen tablet 650 mg  650 mg Oral Q6H PRN Arup ROSAURA Trista, DO        albuterol-ipratropium 2.5mg-0.5mg/3mL nebulizer solution 3 mL  3 mL Nebulization Q4H PRN Arup ROSAURA Trista, DO        apixaban tablet 10 mg  10 mg Oral BID Falguni Magana MD   10 mg at 07/07/17 0822    Followed by    [START ON 7/12/2017] apixaban tablet 5 mg  5 mg Oral BID Falguni Magana MD        atorvastatin tablet 40 mg  40 mg Oral Daily Arup ROSAURA Weston, DO   40 mg at 07/07/17 0822    calcium carbonate tablet 1,000 mg  1,000 mg Oral Daily Falguni Magana MD   1,000 mg at 07/07/17 0822    carvedilol tablet 25 mg  25 mg Oral BID Falguni Magana MD   25 mg at 07/07/17 0827    ergocalciferol capsule 50,000 Units  50,000 Units Oral Q7 Days Falguni Magana MD   50,000 Units at 07/06/17 1245    gabapentin capsule 600 mg  600 mg Oral TID Arup ROSAURA Weston, DO   600 mg at 07/07/17 0601    hydrALAZINE tablet 25 mg  25 mg Oral Q6H PRN Hanane Simpson MD   25 mg at 07/05/17 0602    levetiracetam tablet 750 mg  750 mg Oral BID Arup KElijah Trista, DO   750 mg at 07/07/17 0822    levothyroxine tablet 137 mcg  137 mcg Oral Before breakfast Falguni Magana MD   137 mcg at 07/07/17 0602    memantine tablet 10 mg  10 mg Oral Daily Arup K. Trista, DO   10 mg at 07/07/17 0822    morphine injection 4 mg  4 mg Intravenous Q4H PRN Arup KElijah Trista, DO   4 mg at 07/01/17 2136    ondansetron injection 4 mg  4 mg Intravenous Q6H PRN Arup KElijah Trista, DO        oxycodone immediate release tablet 5 mg  5 mg Oral Q4H PRN Arup ROSAURA Weston DO   5 mg at 07/02/17 0724    phenobarbital tablet 30 mg  30 mg Oral TID Arup ROSAURA Weston, DO   30 mg at 07/07/17 0603    ropivacaine (PF) 2 mg/ml  (0.2%) infusion  8 mL/hr Perineural Continuous Obinna Lee MD 8 mL/hr at 07/07/17 0021 8 mL/hr at 07/07/17 0021    senna-docusate 8.6-50 mg per tablet 1 tablet  1 tablet Oral BID PRN Kimberley Del Real MD   1 tablet at 07/06/17 0810         Assessment:  Virgil Melo Jr. is a 88 y.o., male, [id number] with PMHx of HTN, HLD, PVD, BPH, hypothyroidism, a fib, AS (s/p AVR), partial epilepsy, PE (s/p IVC filter placement), and OA who is s/p IM nailing of R femur 7/3/17.     Pain control adequate    Plan:    Patient doing well, continue present treatment. Pt pain well controlled with infusion catheter and PRN Oxycodone. Will cont current management and f/u with primary team regarding discharge planning. Ok for discharge just awaiting SNF placement.     Evaluator Maria Teresa Nevarez

## 2017-07-07 NOTE — PROGRESS NOTES
CM notified by Aubree at [facility] that patient can transfer to room P215. Nurse can call report to [phone] (RN- Dinorah); main nurses station [phone]. CM updated bedside RN Alfredito. [facility] set up for 5:00pm.    Nydia Gruber RN, CM  [facility]  [phone] -x- [phone]

## 2017-07-07 NOTE — PLAN OF CARE
Problem: Physical Therapy Goal  Goal: Physical Therapy Goal  Goals to be met by: 2017     Patient will increase functional independence with mobility by performin. Supine to sit with Moderate Assistance  2. Sit to supine with Moderate Assistance  3. Rolling to Left and Right with Minimum Assistance.  4. Sit to stand transfer with Moderate Assistance  5. Bed to chair transfer with Moderate Assistance using Rolling Walker  6. Gait  x 10 feet with Moderate Assistance using Rolling Walker.   7. Lower extremity exercise program x 15 reps per handout, with assistance as needed (not issued upon eval) MET       Patient goals remain appropriate.  Patient will continue to benefit from further PT services.  Abraham Mckenzie, PTA

## 2017-07-07 NOTE — ADDENDUM NOTE
Addendum  created 07/07/17 1400 by Maria Teresa Nevarez MD    Anesthesia Event edited, Sign clinical note

## 2017-07-07 NOTE — PLAN OF CARE
2:03 PM  MICAH arranged transportation with [facility] for 5:00PM in will call. Spoke with Aubree with [facility] who is having nurse review orders and will call CM with bed number and who to call report to. Will f/u as needed.     Shyanne Veliz, VICKIE    [phone]

## 2017-07-07 NOTE — PLAN OF CARE
Problem: Occupational Therapy Goal  Goal: Occupational Therapy Goal  Goals to be met by: 7/19/17     Patient will increase functional independence with ADLs by performing:    UE Dressing with Moderate Assistance.  LE Dressing with Moderate Assistance.  Grooming while seated with Minimal Assistance.  Toileting from bedside commode with Moderate Assistance for hygiene and clothing management.   Bathing from  edge of bed with Moderate Assistance.  Toilet transfer to bedside commode with Moderate Assistance.  Increased functional strength to WFL for B UE.  Upper extremity exercise program x15 reps per handout, with assistance as needed.     Outcome: Ongoing (interventions implemented as appropriate)  Continue OT plan of care. Pt is progressing towards OT goals

## 2017-07-07 NOTE — ASSESSMENT & PLAN NOTE
Controlled in hospital with no seizures on home meds and will continue home meds of Keppra and Phenobarbital on discharge.

## 2017-07-07 NOTE — PT/OT/SLP PROGRESS
Physical Therapy  Treatment    Virgil Melo Jr.   MRN: [id number]   Admitting Diagnosis: Closed intertrochanteric fracture of right femur    PT Received On: 07/07/17  PT Start Time: 1100     PT Stop Time: 1124    PT Total Time (min): 24 min       Billable Minutes:  Gait Training8, Therapeutic Activity 8 and Therapeutic Exercise 8    Treatment Type: Treatment  PT/PTA: PTA     PTA Visit Number: 1       General Precautions: Standard, fall  Orthopedic Precautions: RLE weight bearing as tolerated   Braces:           Subjective:  Communicated with NSG prior to session.  Patient agreeable to therapy    Pain/Comfort  Pain Rating 1: 4/10  Location - Side 1: Right  Location 1: hip    Objective:   Patient found with: perineural catheter, FCD    Functional Mobility:  Bed Mobility:   Supine to Sit: Maximum Assistance (VC's for technique)    Transfers:  Sit <> Stand Assistance: Moderate Assistance (x3 trials)  Sit <> Stand Assistive Device: Rolling Walker  Bed <> Chair Technique: Squat Pivot  Bed <> Chair Assistance: Maximum Assistance  Bed <> Chair Assistive Device: No Assistive Device    Gait:   Gait Distance: 3-4 feet  Assistance 1: Total assistance (min assistance x2)  Gait Assistive Device: Rolling walker  Gait Pattern: swing-to gait  Gait Deviation(s): decreased luis, increased time in double stance, decreased velocity of limb motion, decreased step length, decreased stride length, forward lean, decreased toe-to-floor clearance, decreased weight-shifting ability, foot flat (Patient followed with BS chair.  B knee flex gait)          Balance:   Static Sit: 0: Needs MAXIMAL assist to maintain sitting without back support  Dynamic Sit: POOR: N/A  Static Stand: POOR+: Needs MINIMAL assist to maintain  Dynamic stand: POOR: N/A     Therapeutic Activities and Exercises:  Patient performed AP,GS,hip ABD/ADD, LAq and hip flexion x15 reps with AAROM > min assistance  Patient tolerated sitting EOB with SBA > max assistance.  Noted  LOB posteriorly 2x  Patient tolerated static standing with RW x2 trials using RW min assistance.  ( first trial 30-40 sec and second trial 2 min)     AM-PAC 6 CLICK MOBILITY  How much help from another person does this patient currently need?   1 = Unable, Total/Dependent Assistance  2 = A lot, Maximum/Moderate Assistance  3 = A little, Minimum/Contact Guard/Supervision  4 = None, Modified Gilman/Independent    Turning over in bed (including adjusting bedclothes, sheets and blankets)?: 3  Sitting down on and standing up from a chair with arms (e.g., wheelchair, bedside commode, etc.): 2  Moving from lying on back to sitting on the side of the bed?: 2  Moving to and from a bed to a chair (including a wheelchair)?: 2  Need to walk in hospital room?: 2  Climbing 3-5 steps with a railing?: 1  Total Score: 12    AM-PAC Raw Score CMS G-Code Modifier Level of Impairment Assistance   6 % Total / Unable   7 - 9 CM 80 - 100% Maximal Assist   10 - 14 CL 60 - 80% Moderate Assist   15 - 19 CK 40 - 60% Moderate Assist   20 - 22 CJ 20 - 40% Minimal Assist   23 CI 1-20% SBA / CGA   24 CH 0% Independent/ Mod I     Patient left up in chair with all lines intact, call button in reach and daughter present.    Assessment:  Virgil Melo Jr. is a 88 y.o. male with a medical diagnosis of Closed intertrochanteric fracture of right femur and presents with decrease strength, mobility, balance and ROM.  Patient required less assistance with mobility and transfers.  Patient able to perform gait training with assistance x2.  Patient demonstrated poor sitting and standing balance.  Patient able to follow commands.  Patient progressing toward goals.  Patient would benefit from further IP therapy.    Rehab identified problem list/impairments: Rehab identified problem list/impairments: weakness, impaired endurance, gait instability, impaired functional mobilty, impaired balance, impaired self care skills, decreased ROM, decreased  lower extremity function, impaired cognition, decreased coordination, pain, orthopedic precautions    Rehab potential is fair.    Activity tolerance: Fair    Discharge recommendations: Discharge Facility/Level Of Care Needs: nursing facility, skilled     Barriers to discharge: Barriers to Discharge: Inaccessible home environment, Decreased caregiver support    Equipment recommendations: Equipment Needed After Discharge: bedside commode, bath bench, walker, rolling     GOALS:    Physical Therapy Goals        Problem: Physical Therapy Goal    Goal Priority Disciplines Outcome Goal Variances Interventions   Physical Therapy Goal     PT/OT, PT Ongoing (interventions implemented as appropriate)     Description:  Goals to be met by: 2017     Patient will increase functional independence with mobility by performin. Supine to sit with Moderate Assistance  2. Sit to supine with Moderate Assistance  3. Rolling to Left and Right with Minimum Assistance.  4. Sit to stand transfer with Moderate Assistance  5. Bed to chair transfer with Moderate Assistance using Rolling Walker  6. Gait  x 10 feet with Moderate Assistance using Rolling Walker.   7. Lower extremity exercise program x 15 reps per handout, with assistance as needed (not issued upon eval) MET                        PLAN:    Patient to be seen daily  to address the above listed problems via gait training, therapeutic activities, therapeutic exercises  Plan of Care expires: 17  Plan of Care reviewed with: patient         Abraham Mckenzie II, PTA  2017

## 2017-07-07 NOTE — PLAN OF CARE
1:05 PM  MICAH received nursing home orders. Faxed orders to [facility]. Will arrange transportation once notified by Essence with [facility].     Shyanne Veliz LMSW    [phone]

## 2017-07-07 NOTE — PROGRESS NOTES
Flow sheets/ last bowel movement:    Value Information      2      Taken by:   Alfredito Boyer RN at 07/06/17 1704 (yesterday)         Nydia Gruber RN, CM [facility]  [phone] -[phone]

## 2017-07-07 NOTE — PLAN OF CARE
POD 4 s/p IMN of right intertrochanteric femur fracture. PT/OT ordered to eval and treat. PT/OT recommended SNF placement. Plans to transfer/discharge patient to [facility] today. Patient and family verbalized understanding of today's transfer/discharge plan. All questions and concerns addressed. SW and CM will continue to follow for any additional needs. Discharge and follow-up instructions to be completed by the bedside nurse.    Future Appointments  Date Time Provider Department Center   7/17/2017 10:15 AM Diana Martines PA-C [facility]      07/07/17 1942   Final Note   Assessment Type Final Discharge Note   Discharge Disposition SNF  ([facility])   Discharge planning education complete? Yes   What phone number can be called within the next 1-3 days to see how you are doing after discharge? ([phone])   Hospital Follow Up  Appt(s) scheduled? Yes   Discharge plans and expectations educations in teach back method with documentation complete? Yes   Offered [facility] -- Bedside Delivery? Yes   Discharge/Hospital Encounter Summary to ([facility]) PCP n/a   Referral to Outpatient Case Management complete? n/a   Referral to / orders for Home Health Complete? n/a   30 day supply of medicines given at discharge, if documented non-compliance / non-adherence? n/a   Any social issues identified prior to discharge? No   Did you assess the readiness or willingness of the family or caregiver to support self management of care? Yes

## 2017-07-07 NOTE — PLAN OF CARE
11:44 AM  SW received call from Janice with [facility] who stated she has authorization and pt looks good. Informed CM and physician. Called daughter to inform her [facility] cannot accept until Monday. Daughter is fine with [facility]. Will send orders once received and will arrange transportation.    Shyanne Veliz, [facility]    [phone]

## 2017-07-08 PROBLEM — I82.431 ACUTE DEEP VEIN THROMBOSIS (DVT) OF POPLITEAL VEIN OF RIGHT LOWER EXTREMITY: Status: ACTIVE | Noted: 2017-01-01

## 2017-07-08 PROBLEM — I82.431 ACUTE DEEP VEIN THROMBOSIS (DVT) OF POPLITEAL VEIN OF RIGHT LOWER EXTREMITY: Chronic | Status: ACTIVE | Noted: 2017-01-01

## 2017-07-08 NOTE — ASSESSMENT & PLAN NOTE
Encounter for aftercare for healing closed traumatic fracture of hip  Patient is POD # 4. Patient reports minimal pain in right hip. Plan is to continue PT/OT for gait training and strengthening and restoration of ADL's on discharge to [facility] unit today. Patient is FWB/WBAT: right lower extremity as per Orthopedic recommendations. Patient on Apixiban for treatment of PE so no Lovenox needed for DVT prophylaxis. Orthopedics is following and managing surgical site. Pain is well controlled. Perineural catheter removed by Anesthesia today as patient being discharged.

## 2017-07-08 NOTE — DISCHARGE SUMMARY
[facility] Hospital Medicine  Discharge Summary      Patient Name: Virgil Melo Jr.  MRN: [id number]  Admission Date: 7/1/2017  Hospital Length of Stay: 6 days  Discharge Date and Time: 7/7/2017  5:11 PM  Attending Physician: Falguni Magana MD   Discharging Provider: Falguni Magana MD  Primary Care Provider: Luis A Milton MD  Hospital Medicine Team: [facility] HOSP MED H Falguni Magana MD    HPI:   88 M with a PMH of Epilepsy since childhood ( on keppra, phenobarbital and gabapentin and followed by neurology at [facility] ), dementia,  HTN, Hypothyroidism, aortic stenosis  S/P bioprosthetic AV replacement more than ten years ago presents as a transfer from [facility] for further evaluation and management of right hip fracture. Patient is a poor informant given h/o dementia. Daughter at bedside provided HPI. Patient lives at assisted living facility and ambulates with cane due to unsteady gait. He had an unwitnessed fall inside his room Friday evening (06/30 ) around 6 pm while taking off his pants. Daughter states she thinks patient might have lost balance while undressing and fell to the floor on his buttock. Nursing staff found patient on the floor on his buttock and complaining of right hip pain, notified daughter and sent him to [facility] via EMS. At [facility] imaging including CT pelvis showed intertrochanteric right hip fracture and new onset Afib. Patient's insurance not accepted by [facility] and thus transferred to [facility]. Daughter states since her mother passed away couple of years ago she has been managing his medications and he has been taking only the following meds : keppra, phenobarbital, gabapentin and namenda.      During my interview patient is awake, but confused. Oriented to person only and complaining of right hip pain. Morphine 4 mg IV x 1 given with improved pain. O2 sat in mid 80s on 5 liter N/C. Elevated troponin 0.248, no chest pain, STAT EKG showed afib with RVR ( HR ~ 110 ). HR improved to 80s  after lopressor 5 mg IVP. O2 sat improved to low 90s on NRB mask. Cardiology was consulted for pre op evaluation given elevated troponin, afib W RVR and hypoxia. STAT CTA chest showed extensive pulmonary saddle embolus, involving all lobes with associated right heart strain. CT head negative for bleeding. CCS was consulted and patient was transferred to ICU after CTA showed saddle embolus for close monitoring of hemodynamic/respiratory status.       OR date: 7/3/2017   Procedure(s) (LRB):  INSERTION-FILTER-INFERIOR VENA CAVA (N/A)   Surgeon(s):  MD Lorenzo Youssef MD     OR date: 7/3/2017   Procedure(s) (LRB):  IM NAILING OF FEMUR - RIGHT (Right)  Surgeon(s):    James F. Mautner, MD Michael Joseph Casale, MD      Indwelling Lines/Drains at time of discharge:   Lines/Drains/Airways     Pressure Ulcer                 Pressure Ulcer 07/01/17 2020 midline buttocks suspected deep tissue injury 6 days              Hospital Course:   Patient was admitted on 7/1/2017 with acute right intertrochanteric hip fracture and Orthopedics recommended operative repair. During preop evaluation for surgical hip fixation, patient was noted to be desaturating to 76% on 5L nasal cannula with atrial fibrillation in 90's and SBP systolic>110. He was then placed on nonrebreather and sat 91% and admitted to medical ICU. Patient had CTA of chest done that showed submassive acute pulmonary embolus that was present at time of admission. Patient also underwent vascular ultrasound of bilateral lower extremities at time of admit that showed acute occlusive thrombus in right popliteal vein. No DVT seen in left lower extremity and condition was present on admit. 2 D echo was also done that showed normal EF 50-55% but no evidence of right heart strain to suggest need for thrombolytic therapy. Operative repair of right fracture delayed until oxygenation was stabilized in light of acute PE and patient placed on IV Heparin.  Patient was able to be weaned to high flow oxygen prior to surgical procedure on 7/3. Patient was taken to the OR on 7/3/2017. Patient had IVC filter placed by Vascular Surgery in OR on 7/3 as had to stop IV Heparin for operative repair of hip and needed filter in place to protect patient during surgery off anticoagulation. Patient underwent right hip cephalomedullary nail fixation by Dr. Branden Gonzales on 7/3. Post-op patient was WBAT to the right lower extremity as per Orthopedics recommendation. Post-op, patient placed back on IV Heparin drip for treatment of pulmonary embolus. Perineural pain catheter placed by Anesthesia Pain Service with continuous infusion of Ropivacaine to help with pain control. PT/OT were consulted and evaluated patient. Patient transferred from MICU to floor on 7/5 after further weaning of oxygen and patient required no further oxygen on stepdown on 7/5. IV Heparin drip discontinued on 7/5 and patient started on Apixiban 10 mg po BID for 7 days then decrease to 5 mg po BID for treatment of PE as well as for long term anticoagulation for atrial fibrillation. Patient back in sinus rhythm post-op. Patient remained off oxygen and doing well with PT/OT who are recommended SNF placement. Patient was accepted at [facility] unit for 7/7. Patient medically stable and discharged to [facility]. Patient to continue Apixiban on discharge for PE treatment as well as long term anticoagulation for atrial fibrillation. Patient discharged on Apixiban 10mg by mouth twice daily for 5 more days with end date of 7/12/2017 then decrease dose to 5 mg my mouth twice daily on 7/13/2017 and continue indefinitely for treatment of PE and also for stroke prevention for atrial fibrillation. Surgical bandage to remain in place to right hip until Orthopedic clinic follow-up on 7/17/2017 with LEANA Ramirez. Patient discharged on Norco 5/325 1 tablet po every 4 hours as needed for  pain. Patient will need follow-up with Vascular Surgery- Dr. Bowens in 2 months to discuss removal of IVC filter.      Consults:   Consults         Status Ordering Provider     Inpatient consult to Cardiology  Once     Provider:  (Not yet assigned)    Completed CAROLEE VARGAS.     Inpatient consult to Critical Care Medicine  Once     Provider:  (Not yet assigned)    Completed CAROLEE VARGAS     Inpatient consult to Orthopedic Surgery  Once     Provider:  (Not yet assigned)    Completed CAROLEE VARGAS     Inpatient consult to [facility]  Once     Provider:  (Not yet assigned)    Completed ELGIN COYLE     Inpatient consult to Vascular Surgery  Once     Provider:  (Not yet assigned)    Completed TORIN SIDDIQUI     IP consult to dietary  Once     Provider:  (Not yet assigned)    Completed TRACIE CARDONA          Significant Diagnostic Studies: Labs:   CBC   Recent Labs  Lab 07/06/17  0830 07/06/17  1813 07/07/17  0837   WBC 10.80 11.86 10.95   HGB 11.6* 12.3* 11.3*   HCT 35.9* 36.9* 35.0*    201 180     Recent Labs      07/06/17   0515   GLU  108   NA  138   K  4.8   CL  109   CO2  20*   BUN  23   CREATININE  1.0   CALCIUM  8.4*   ANIONGAP  9   MG  2.0   PHOS  3.5      Radiology/Diagnostic studies:    7/02/2017  US Lower Extremity Veins Bilateral   Acute occlusive DVT in the right popliteal vein.  No evidence of DVT in the left lower extremity.     7/02/2017  CTA Chest Non Coronary   There is extensive pulmonary thromboembolism, noting saddle embolus.  Embolus extends from the main pulmonary artery trunk, into the bilateral left and right pulmonary arteries, and extends distally to several lobar, segmental, and subsegmental branches involving all lobes. There is expansion of the right ventricle, minimal internal volume of the left ventricle suggesting elevated right heart pressure and right heart strain.    7/02/2017  2D echo with color flow doppler  CONCLUSIONS     1 - Low normal to mildly depressed  left ventricular systolic function (EF 50-55%).     2 - Concentric remodeling.     3 - Wall motion abnormalities.     4 - Indeterminate LV diastolic function.     5 - Biatrial enlargement.     6 - Right ventricular enlargement with moderately depressed systolic function.     7 - Aortic valve prosthesis, effective prosthetic valve area corrected for BSA is 0.53 cm2.     8 - Mild tricuspid regurgitation.     9 - The estimated PA systolic pressure is 32 mmHg.     7/02/2017  CTA Chest Non Coronary   There is extensive pulmonary thromboembolism, noting saddle embolus.  Embolus extends from the main pulmonary artery trunk, into the bilateral left and right pulmonary arteries, and extends distally to several lobar, segmental, and subsegmental branches involving all lobes.  There is expansion of the right ventricle, minimal internal volume of the left ventricle suggesting elevated right heart pressure and right heart strain.    Pending Diagnostic Studies:     None        Final Active Diagnoses:    Diagnosis Date Noted POA    PRINCIPAL PROBLEM:  Closed intertrochanteric fracture of right femur s/p IMN on 7/3/17 [S72.141A] 07/01/2017 Yes    Acute saddle pulmonary embolism without acute cor pulmonale [I26.92] 07/02/2017 Yes    Paroxysmal atrial fibrillation [I48.0] 07/02/2017 Yes    Essential hypertension [I10]  Yes    Partial symptomatic epilepsy with complex partial seizures, not intractable, without status epilepticus [G40.209]  Yes    Vascular dementia without behavioral disturbance [F01.50] 10/09/2012 Yes    Osteoporosis, senile [M81.0] 10/30/2012 Yes    Acquired hypothyroidism [E03.9] 02/20/2013 Yes    Vitamin D deficiency disease [E55.9] 07/06/2017 Yes    Encounter for aftercare for healing closed traumatic fracture of hip [S72.009D] 07/05/2017 Not Applicable    Acute deep vein thrombosis (DVT) of popliteal vein of right lower extremity [I82.431] 07/01/2017 Yes    Benign non-nodular prostatic hyperplasia  without lower urinary tract symptoms [N40.0]  Yes      Problems Resolved During this Admission:    Diagnosis Date Noted Date Resolved POA    Closed intertrochanteric fracture of femur [S72.143A] 07/05/2017 07/05/2017 Yes    Femur fracture, right [S72.91XA] 07/02/2017 07/05/2017 Yes    Unsteady gait [R26.81] 07/02/2017 07/07/2017 Yes    Acute respiratory failure with hypoxia [J96.01] 07/02/2017 07/07/2017 Yes    Tachycardia [R00.0]  07/05/2017 Yes      Acute saddle pulmonary embolism without acute cor pulmonale    Acute deep vein thrombosis (DVT) of popliteal vein of right lower extremity  Acute respiratory failure with hypoxia  Improved. Respiratory failure resolved. Patient noted on admit to have acute saddle embolus present on admit with acute hypoxic respiratory failure due to PE. Patient was initailly on NRB on admit and then titrated to high flow oxygen in MICU. Patient started on IV Heparin drip for treatment of PE and had IVC filter placed on 7/3 so patient could undergo operative fixation of right hip fracture. Patient remains on IV Heparin drip post-op. Respiratory failure resolved and patient now on room air and oxygen sats 92% and goal is to keep oxygen sats > 90%. IV Heparin drip discontinued on 7/5 and patient transitioned to oral Apixiban for long term anticoagulation for both atrial fibrillation and treatment of acute PE. Patient started on Apixiban 10 mg po BID for 7 days on 7/5 with end date of 7/12 and then 5 mg po BID starting on 7/13 for indefinite treatment of both PE and stroke prophylaxis for atrial fibrillation.              Paroxysmal atrial fibrillation    Patient back in sinus rhythm and rate controlled. Patient's CHADsVASC score is 6 so needs long term anticoagulation and patient started on Apixiban in hospital and needs indefinite treatment. Patient started on Coreg in hospital and needs to continue Coreg 25 mg po BID for rate control on discharge. Patient on Toprol XL as outpatient  this is being discontinued on discharge.          Essential hypertension    Patient's blood pressure controlled here in the hospital. Goal for blood pressure is SBP < 150 and DBP < 90 as patient > or = 60 years of age with no diabetes or advanced kidney disease based on JNC 8 guidelines. Plan to continue current treatment regimen of Coreg 25 mg po BID that was started in MICU on discharge and discontinue Toprol XL patient was on as outpatient.          Partial symptomatic epilepsy with complex partial seizures, not intractable, without status epilepticus    Controlled in hospital with no seizures on home meds and will continue home meds of Keppra and Phenobarbital on discharge.        Vascular dementia without behavioral disturbance    Controlled. Continue Namenda to treat on discharge. Patient at cognitive baseline on discharge.         Osteoporosis, senile    Vitamin D deficiency  Patient admitted with hip fracture related to osteoporosis. Patient has been advised to avoid smoking and moderation of alcohol use with regular weightbearing exercise to help reduce risk of future fractures associated with osteoporosis. Vitamin D level is low at 16 consistent with moderate deficiency and started on Vitamin D replacement at 50,000 units po weekly and will continue on discharge and recheck level as outpatient in 6-8 weeks. Patient will need outpatient DEXA scan and further evaluation for additional treatment of osteoporosis. Will refer patient to Orthopedic Fracture Clinic on discharge for further evaluation and management of osteoporosis.            Acquired hypothyroidism    Controlled. Continue Synthroid at 137 mcg po daily to treat on discharge as taking previously.               Discharged Condition: good    Disposition: Skilled Nursing Facility ([facility])    Follow Up:  Future Appointments  Date Time Provider Department Center   7/17/2017 10:15 AM Diana Martines PA-C [facility]TH[facility] [address]       Follow-up  Information     Dino Bowens MD In 2 months.    Specialty:  Vascular Surgery  Why:  discuss retrieval of ivc filter   Contact information:  [address]  [facility] [address]  [phone]              Patient Instructions:     Diet general     Activity as tolerated     Call MD for:  temperature >100.4     Call MD for:  increased confusion or weakness     Call MD for:  persistent dizziness, light-headedness, or visual disturbances     Call MD for:  worsening rash     Call MD for:  severe persistent headache     Call MD for:  difficulty breathing or increased cough     Call MD for:  redness, tenderness, or signs of infection (pain, swelling, redness, odor or green/yellow discharge around incision site)     Call MD for:  severe uncontrolled pain     Call MD for:  persistent nausea and vomiting or diarrhea     Leave dressing on - Keep it clean, dry, and intact until clinic visit       Medications:  Reconciled Home Medications:   Discharge Medication List as of 7/7/2017  5:20 PM      START taking these medications    Details   acetaminophen (TYLENOL) 325 MG tablet Take 2 tablets (650 mg total) by mouth every 6 (six) hours as needed (Mild to moderate pain)., Starting Fri 7/7/2017, OTC      apixaban 5 mg Tab Take 2 tablets (10mg) by mouth twice daily for 5 more days with end date of 7/12/2017 then decrease dose to 1 tablet (5mg) my mouth twice daily on 7/13/2017 and continue indefinitely for treatment of PE and also for stroke prevention for atrial fibrillat ion., No Print      calcium carbonate (OS-ROSA) 500 mg calcium (1,250 mg) tablet Take 2 tablets (1,000 mg total) by mouth once daily., Starting Fri 7/7/2017, Until Sat 7/7/2018, OTC      carvedilol (COREG) 25 MG tablet Take 1 tablet (25 mg total) by mouth 2 (two) times daily., Starting Fri 7/7/2017, Until Sat 7/7/2018, No Print      ergocalciferol (ERGOCALCIFEROL) 50,000 unit Cap Take 1 capsule (50,000 Units total) by mouth Every Friday., Starting Fri  7/7/2017, No Print      hydrocodone-acetaminophen 5-325mg (NORCO) 5-325 mg per tablet Take 1 tablet by mouth every 4 (four) hours as needed for Pain., Starting Fri 7/7/2017, Print      senna-docusate 8.6-50 mg (PERICOLACE) 8.6-50 mg per tablet Take 1 tablet by mouth 2 (two) times daily as needed for Constipation., Starting Fri 7/7/2017, OTC         CONTINUE these medications which have CHANGED    Details   phenobarbital (LUMINAL) 32.4 MG tablet Take 1 tablet (32.4 mg total) by mouth 3 (three) times daily., Starting Fri 7/7/2017, Until Sun 8/6/2017, Print         CONTINUE these medications which have NOT CHANGED    Details   aspirin (ECOTRIN) 325 MG EC tablet Take 1 tablet by mouth Daily., Starting 7/2/2012, Until Discontinued, Historical Med      atorvastatin (LIPITOR) 40 MG tablet TAKE 1 TABLET BY MOUTH EVERY DAY, Normal      gabapentin (NEURONTIN) 800 MG tablet Take 1 tablet (800 mg total) by mouth 3 (three) times daily., Starting 1/30/2017, Until Discontinued, Print      levetiracetam (KEPPRA) 750 MG Tab Take 1 tablet (750 mg total) by mouth 2 (two) times daily., Starting 1/30/2017, Until Discontinued, Print      levothyroxine (SYNTHROID) 137 MCG Tab tablet TAKE 1 TABLET BY MOUTH EVERY DAY, Normal      memantine (NAMENDA) 10 MG Tab 1 BID, Print         STOP taking these medications       calcium-vitamin D 500-125 mg-unit tablet Comments:   Reason for Stopping:         FLUZONE HIGH-DOSE 2015-16, PF, 180 mcg/0.5 mL Syrg Comments:   Reason for Stopping:         metoprolol succinate (TOPROL-XL) 25 MG 24 hr tablet Comments:   Reason for Stopping:         vitamin D 185 MG Tab Comments:   Reason for Stopping:             Time spent on the discharge of patient: 36 minutes    Falguni Magana MD  Department of Hospital Medicine  [facility]

## 2017-07-11 NOTE — PT/OT/SLP DISCHARGE
Physical Therapy Discharge Summary    Virgil Melo Jr.  MRN: [id number]   Closed intertrochanteric fracture of right femur     Patient Discharged from acute Physical Therapy on 2017.  Please refer to prior PT noted date on 2017 for functional status.     Assessment:   Patient appropriate for care in another setting.  GOALS:    Physical Therapy Goals     Not on file          Multidisciplinary Problems (Resolved)        Problem: Physical Therapy Goal    Goal Priority Disciplines Outcome Goal Variances Interventions   Physical Therapy Goal   (Resolved)     PT/OT, PT Outcome(s) achieved     Description:  Goals to be met by: 2017     Patient will increase functional independence with mobility by performin. Supine to sit with Moderate Assistance  2. Sit to supine with Moderate Assistance  3. Rolling to Left and Right with Minimum Assistance.  4. Sit to stand transfer with Moderate Assistance  5. Bed to chair transfer with Moderate Assistance using Rolling Walker  6. Gait  x 10 feet with Moderate Assistance using Rolling Walker.   7. Lower extremity exercise program x 15 reps per handout, with assistance as needed (not issued upon eval) MET                      Reasons for Discontinuation of Therapy Services  Transfer to alternate level of care.      Plan:  Patient Discharged to: Skilled Nursing Facility.    Janina Cevallos, PT  2017  [phone] (pager)

## 2017-07-14 NOTE — PT/OT/SLP DISCHARGE
Occupational Therapy Discharge Summary    Virgil Melo Jr.  MRN: [id number]   Closed intertrochanteric fracture of right femur   Patient Discharged from acute Occupational Therapy on 7/7/17  Please refer to prior OT note dated on 7/7/17for functional status.     Assessment:   Patient appropriate for care in another setting.  GOALS:    Occupational Therapy Goals        Problem: Occupational Therapy Goal    Goal Priority Disciplines Outcome Interventions   Occupational Therapy Goal     OT, PT/OT Ongoing (interventions implemented as appropriate)    Description:  Goals to be met by: 7/19/17     Patient will increase functional independence with ADLs by performing:    UE Dressing with Moderate Assistance.  LE Dressing with Moderate Assistance.  Grooming while seated with Minimal Assistance.  Toileting from bedside commode with Moderate Assistance for hygiene and clothing management.   Bathing from  edge of bed with Moderate Assistance.  Toilet transfer to bedside commode with Moderate Assistance.  Increased functional strength to WFL for B UE.  Upper extremity exercise program x15 reps per handout, with assistance as needed.                     Reasons for Discontinuation of Therapy Services  Transfer to alternate level of care.      Plan:  Patient Discharged to: Skilled Nursing Facility.

## 2017-07-14 NOTE — ANESTHESIA POSTPROCEDURE EVALUATION
Anesthesia Post Evaluation    Patient: Virgil Melo Jr.    Procedure(s) Performed: Procedure(s) (LRB):  IM NAILING OF FEMUR - RIGHT (Right)    Final Anesthesia Type: general  Patient location during evaluation: PACU  Patient participation: Yes- Able to Participate  Level of consciousness: awake and alert  Post-procedure vital signs: reviewed and stable  Pain management: adequate  Airway patency: patent  PONV status at discharge: No PONV  Anesthetic complications: no      Cardiovascular status: blood pressure returned to baseline  Respiratory status: unassisted  Hydration status: euvolemic  Follow-up not needed.        Visit Vitals  /61 (BP Location: Left arm, Patient Position: Lying, BP Method: Automatic)   Pulse 70   Temp 36.3 °C (97.4 °F) (Oral)   Resp 16   Ht 6' (1.829 m)   Wt 91.6 kg (202 lb)   SpO2 95%   BMI 27.40 kg/m²       Pain/Ibrahima Score: No Data Recorded

## 2017-07-17 NOTE — PROGRESS NOTES
Mr. Melo is an 89 yo male who was transferred from [facility] with a right intertrochanteric femur fracture treated with CMN on 07/03/2017.   Patient also was found to have saddle pulmonary embolus and popliteal DVT in right leg. Recived IVC filter 07/03/2017 by . Currently on Apixaban 5 mg.      He is  here today for a post-operative visit after a  Cephalomedullary nail fixation of right intertrochanteric femur fracture   by Dr. Gonzales  on 07/03/2017.    he reports that he is doing ok.  Pain is controlled.  he is  taking pain medication.  He is at [facility].     he denies fever, chills, and sweats since the time of the surgery.     Physical exam:  Post op dressing taken down.  Incision is clean, dry and intact.  Sutures removed without difficulty.      Assessment:  Post-op visit    Plan:  - The patient was advised to keep the incision clean and dry for the next 24 hours after which he may wash the area with antibacterial soap in the shower. Will not submerge until the incision is completely healed  - weight bearing as tolerated, range of motion as tolerated.   - pain medication :no refill needed at facility.   - continue anticoagulation therapy as recommended. follow up appointment with  2 months post op per discharge summary.  - return to clinic in 4 weeks at time x- ray of his femur is needed

## 2017-08-09 PROBLEM — G93.41 SEPTIC ENCEPHALOPATHY: Status: ACTIVE | Noted: 2017-01-01

## 2017-08-09 PROBLEM — N39.0 COMPLICATED UTI (URINARY TRACT INFECTION): Status: ACTIVE | Noted: 2017-01-01

## 2017-08-09 PROBLEM — N17.9 AKI (ACUTE KIDNEY INJURY): Status: ACTIVE | Noted: 2017-01-01

## 2017-08-09 PROBLEM — R33.9 URINARY RETENTION: Status: ACTIVE | Noted: 2017-01-01

## 2017-08-09 NOTE — PHARMACY MED REC
"Admission Medication Reconciliation - Pharmacy Consult Note    The home medication history was taken by Lety Harley Pharmacy Tech. Based on information gathered and subsequent review by the clinical pharmacist, the items below may need attention.     You may go to "Admission" then "Reconcile Home Medications" tabs to review and/or act upon these items. Based on information gathered and subsequent review by the clinical pharmacist, the items below may need attention.    Potentially problematic discrepancies with current MAR  o Patient is taking a drug DIFFERENTLY than how ordered upon admit  o Levothyroxine 150 mcg PO daily (ordered as 137 mcg)  o Memantine 10mg PO BID (ordered as QD)    Sheila Arana, PharmD  [id number]    Please add appropriate    SmartPhrase below:        "

## 2017-08-09 NOTE — ED NOTES
PEC assessment was not done by night shift RN.      Virgil Melo Jr. is in room # 12, was placed in paper scrubs and all cords and wires have been removed. The patient has signed their mental health rights and they have been placed in the chart. All the patient's belongings have been removed, labeled and locked in the belonging's closet. The PEC has been completed, signed, dated and placed in the patient's chart. There is a sitter at the bedside with direct visual contact doing 15 minute observations and they have no belongings in the room. If family/vistors are present, they have also been made aware of the no belongings policy and have stated their understanding. The SADD tool was done on intake. The transfer sheet should be signed upon the actual transfer. The Psych hold orders have been signed, dated and placed in the chart. Vital signs are being done per orders.The psych resident has been notified. The patient has been medically cleared.  The next of kin was visiting this am.

## 2017-08-09 NOTE — CONSULTS
Consult received. Chart reviewed and discussed case with staff ED physician Dr. Taylor. Patient with acute delirium, with normal mental status two days prior, brought in on OPC with behavior concerning for suicidal ideation. OPC has since been lifted. Patient has been admitted to medicine for management of delirium. We will be unable to evaluate the patient for suicidal ideation while he is acutely delirious. Please re-consult if assistance is required managing delirium or if the patient's mental status has improved and concerns remain for SI, at which point the Psychiatry consult team will evaluate the patient.     Branden Quezada IV, MD  PGY-3 Psychiatry, [facility]  08/09/2017 7:39 AM

## 2017-08-09 NOTE — ASSESSMENT & PLAN NOTE
- likely 2/2 to urinary retention  - failed outpatient treatment with bactrim  - recent alteration in mental status may be due to septic encephalopathy  - UA showed 3+ leuks, 95 WBCs, few WBC clumps, moderate bacteria  - urine and blood cultures pending  - started on ceftriaxone

## 2017-08-09 NOTE — ED TRIAGE NOTES
Virgil Melo ., a 88 y.o. male presents to the ED 12      Chief Complaint   Patient presents with    transfer      pt presents to the ed from [facility] for eval of altered mental status      Daughter states that pt has a hx of early stages of dementia. Pt fell recently and fractured his right hip. Pt was placed in [facility] for rehabilitation and daughter states pt has been increasingly confused/aggressive. Last week urine showed UTI and pt has been on bactrim. Pt only oriented to person but is calm and cooperative during assessment. Pt denies any pain but does frequently state that he has to urinate.     Review of patient's allergies indicates:   Allergen Reactions    Penicillins Hives     Other reaction(s): Hives     Past Medical History:   Diagnosis Date    Acquired hypothyroidism 2/20/2013    Acute deep vein thrombosis (DVT) of popliteal vein of right lower extremity 07/01/2017    Acute saddle pulmonary embolism without acute cor pulmonale 7/2/2017    Benign non-nodular prostatic hyperplasia without lower urinary tract symptoms     Closed intertrochanteric fracture of right femur s/p IMN on 7/3/17 7/1/2017    Essential hypertension     HEARING LOSS     uses hearing aids    Macular degeneration - Both Eyes 1/15/2013    Mixed hyperlipidemia 10/30/2012    Paroxysmal atrial fibrillation 7/2/2017    Partial symptomatic epilepsy with complex partial seizures, not intractable, without status epilepticus     Peripheral vascular disease 10/30/2012    Posterior vitreous detachment 1/15/2013    Pseudophakia of both eyes 1/15/2013    S/P AVR (aortic valve replacement) 10/30/2012    Scoliosis (and kyphoscoliosis), idiopathic     Stroke     Vascular dementia without behavioral disturbance 10/9/2012    Vitamin D deficiency disease 7/6/2017

## 2017-08-09 NOTE — ASSESSMENT & PLAN NOTE
- baseline Cr 0.9-1.0; presented with Cr 1.3  - likely obstructive in nature 2/2 acute urinary retention  - received 1 L NS in ED, will bolus another liter and start MIVF  - urine studies ordered  - avoiding nephrotoxins, NSAIDs, IV contrast

## 2017-08-09 NOTE — HPI
Mr Kameron is an 89 y/o M with a history of epilepsy, vascular dementia (on memantine), AF and PE (on eliquis), recent hip fracture with surgical repair on 7/3, and recent UTI for which he was taking bactrim presents to the ED with altered mental status. History provided by daughter. She reports that his mental status has declined in recent years and for the last two weeks he has exhibited aggression, combativeness, and suicidal ideations. He was taken to [facility] last week, but was not admitted as he was not exhibiting SI at the time. He was started on Bactrim for a UTI two days ago at the rehab facility. Daughter reports that he was at his recent baseline 2 days ago, but she received a call from his rehab facility last night stating that he had become more altered with further SI and combativeness.     In the ED, pt was disoriented, aggressive, and abusive toward staff, so PEC placed and psych consulted. UA was concerning for continued UTI, so pt started on ceftriaxone. Pt also had suprapubic tenderness and palpable bladder on physical exam- Archer was placed and drained 1675cc of slightly blood-tinged urine. Currently patient is calm and cooperative, but daughter states that he is more confused now than at baseline.

## 2017-08-09 NOTE — SUBJECTIVE & OBJECTIVE
Past Medical History:   Diagnosis Date    Acquired hypothyroidism 2/20/2013    Acute deep vein thrombosis (DVT) of popliteal vein of right lower extremity 07/01/2017    Acute saddle pulmonary embolism without acute cor pulmonale 7/2/2017    Benign non-nodular prostatic hyperplasia without lower urinary tract symptoms     Closed intertrochanteric fracture of right femur s/p IMN on 7/3/17 7/1/2017    Essential hypertension     HEARING LOSS     uses hearing aids    Macular degeneration - Both Eyes 1/15/2013    Mixed hyperlipidemia 10/30/2012    Paroxysmal atrial fibrillation 7/2/2017    Partial symptomatic epilepsy with complex partial seizures, not intractable, without status epilepticus     Peripheral vascular disease 10/30/2012    Posterior vitreous detachment 1/15/2013    Pseudophakia of both eyes 1/15/2013    S/P AVR (aortic valve replacement) 10/30/2012    Scoliosis (and kyphoscoliosis), idiopathic     Stroke     Vascular dementia without behavioral disturbance 10/9/2012    Vitamin D deficiency disease 7/6/2017       Past Surgical History:   Procedure Laterality Date    AORTIC VALVE REPLACEMENT  08/2011    23 mm porcine AVR    CARDIAC CATHETERIZATION  6/11    mild non-obstructive CAD    CATARACT EXTRACTION, BILATERAL      CHOLECYSTECTOMY      TON FILTER PLACEMENT  07/03/2017    Consider removal in 2 months     HEMORRHOID SURGERY      INTERTROCHANTERIC HIP FRACTURE SURGERY Right 07/03/2017    IM nail       Review of patient's allergies indicates:   Allergen Reactions    Penicillins Hives     Tolerated ceftriaxone       No current facility-administered medications on file prior to encounter.      Current Outpatient Prescriptions on File Prior to Encounter   Medication Sig    acetaminophen (TYLENOL) 325 MG tablet Take 2 tablets (650 mg total) by mouth every 6 (six) hours as needed (Mild to moderate pain).    apixaban 5 mg Tab Take 2 tablets (10mg) by mouth twice daily for 5 more  days with end date of 7/12/2017 then decrease dose to 1 tablet (5mg) my mouth twice daily on 7/13/2017 and continue indefinitely for treatment of PE and also for stroke prevention for atrial fibrillation.    aspirin (ECOTRIN) 325 MG EC tablet Take 1 tablet by mouth Daily.    atorvastatin (LIPITOR) 40 MG tablet TAKE 1 TABLET BY MOUTH EVERY DAY    calcium carbonate (OS-ROSA) 500 mg calcium (1,250 mg) tablet Take 2 tablets (1,000 mg total) by mouth once daily.    carvedilol (COREG) 25 MG tablet Take 1 tablet (25 mg total) by mouth 2 (two) times daily.    ergocalciferol (ERGOCALCIFEROL) 50,000 unit Cap Take 1 capsule (50,000 Units total) by mouth Every Friday.    gabapentin (NEURONTIN) 800 MG tablet Take 1 tablet (800 mg total) by mouth 3 (three) times daily.    hydrocodone-acetaminophen 5-325mg (NORCO) 5-325 mg per tablet Take 1 tablet by mouth every 4 (four) hours as needed for Pain.    levetiracetam (KEPPRA) 750 MG Tab Take 1 tablet (750 mg total) by mouth 2 (two) times daily.    levothyroxine (SYNTHROID) 137 MCG Tab tablet TAKE 1 TABLET BY MOUTH EVERY DAY    memantine (NAMENDA) 10 MG Tab 1 BID    phenobarbital (LUMINAL) 32.4 MG tablet Take 1 tablet (32.4 mg total) by mouth 3 (three) times daily.    senna-docusate 8.6-50 mg (PERICOLACE) 8.6-50 mg per tablet Take 1 tablet by mouth 2 (two) times daily as needed for Constipation.     Family History     Problem Relation (Age of Onset)    COPD Father, Brother        Social History Main Topics    Smoking status: Never Smoker    Smokeless tobacco: Never Used    Alcohol use No    Drug use: No    Sexual activity: Not on file     Review of Systems   Unable to perform ROS: Dementia     Objective:     Vital Signs (Most Recent):  Temp: 98.2 °F (36.8 °C) (08/09/17 0031)  Pulse: 87 (08/09/17 0552)  Resp: 20 (08/09/17 0503)  BP: (!) 173/81 (08/09/17 0552)  SpO2: (!) 94 % (08/09/17 0552) Vital Signs (24h Range):  Temp:  [98.2 °F (36.8 °C)] 98.2 °F (36.8 °C)  Pulse:   [68-87] 87  Resp:  [16-20] 20  SpO2:  [94 %-100 %] 94 %  BP: (140-175)/(65-92) 173/81     Weight: 90.7 kg (200 lb)  Body mass index is 27.09 kg/m².    Physical Exam   Constitutional: He appears well-developed and well-nourished. No distress.   HENT:   Head: Normocephalic and atraumatic.   Eyes: EOM are normal. Pupils are equal, round, and reactive to light.   Neck: Normal range of motion. No JVD present.   Cardiovascular: Normal rate and intact distal pulses.    No murmur heard.  Irregularly irregular rhythm   Pulmonary/Chest: Effort normal and breath sounds normal. No respiratory distress.   Abdominal: Soft. Bowel sounds are normal. He exhibits mass (over suprapubic area). He exhibits no distension. There is tenderness (over suprapubic area). There is no guarding.   Genitourinary:   Genitourinary Comments: Archer in place   Musculoskeletal: He exhibits no edema.   Neurological: He is alert.   Oriented to self, not to place, time or situation   Skin: Skin is warm and dry. He is not diaphoretic.   Psychiatric:   Calm, cooperative, and following commands during my interview  Confused, perseverates and difficult to redirect        Significant Labs:   CBC:   Recent Labs  Lab 08/09/17  0342   WBC 7.79   HGB 11.3*   HCT 35.4*        CMP:   Recent Labs  Lab 08/09/17  0718      K 3.9   *   CO2 19*   *   BUN 23   CREATININE 1.3   CALCIUM 8.7   PROT 7.4   ALBUMIN 2.9*   BILITOT 0.3   ALKPHOS 98   AST 17   ALT 22   ANIONGAP 11   EGFRNONAA 48.7*     Coagulation:   Recent Labs  Lab 08/09/17  0342   INR 1.1     Lactic Acid:   Recent Labs  Lab 08/09/17  0342   LACTATE 1.1     Magnesium:   Recent Labs  Lab 08/09/17  0718   MG 1.9     TSH:   Recent Labs  Lab 08/09/17  0342   TSH 1.419     Urine Studies:   Recent Labs  Lab 08/09/17  0211   COLORU Red*   APPEARANCEUA Cloudy*   PHUR 7.0   SPECGRAV 1.015   PROTEINUA 2+*   GLUCUA Negative   KETONESU Negative   BILIRUBINUA Negative   OCCULTUA 1+*   NITRITE  Negative   UROBILINOGEN Negative   LEUKOCYTESUR 3+*   RBCUA 3   WBCUA 95*   BACTERIA Moderate*   HYALINECASTS 0     All pertinent labs within the past 24 hours have been reviewed.    Significant Imaging: I have reviewed all pertinent imaging results/findings within the past 24 hours.

## 2017-08-09 NOTE — ED PROVIDER NOTES
"Encounter Date: 8/9/2017       History     Chief Complaint   Patient presents with    transfer      pt presents to the ed from [facility] for eval of altered mental status      Mr. Melo is an 88 y.o. male w/ history of epilepsy (on multiple AEDs), vascular dementia (on nemanda), recent hip fracture w/ surgery (currently immobilized), and current UTI (on bactrim), AF (on eloquis), and hypothyroid (on synthroid), s/p aortic valve replacement that presented to the to ED under the care/supervision of his daughter.  He was transferred from [facility] per the preference of his family.  The chief complaint is altered mental status.  Patient is unable to provide accurate or reliable history.  Per his daughter, he has had a gradual, possibly stepwise decline for several years.  She saw him two days ago and said he was at his baseline.  However, she received a call from the rehab center (s/p sx for hip fx) where the caregivers reported that he was altered w/ evidence of SI, has verbalized harm to others, threatening gestures, and per report in [facility] note, was found w/ plastic bag over head.  Daughter says that at present he appears more aggressive and that he has poor short term memory.  Due to medical and safety concerns, the patient was required to receive medical attention.  Patient was transferred from [facility] to his "home" hospital ([facility]) for evaluation of altered mental status/delerium and to obtain a psychiatric consult.     Pt recently diagnosed w/ UTI and on bactrim.  Pt is not ambulatory/bed bound d/t recent hip surgery yet does not have barton catheter; daughter states she thinks he is left to urinate in a diaper.    Summary of labs/imaging obtained at [facility] ED on 8/8/17:    CXR: no acute pulmonary disease  WBC 10.3, H/H 10.5/32.3, , Neutrophil % 52.7  UA: (negative/WNL unless specified otherwise) 1+ bilirubin, 1+ protein, positive nitrites, 3+ leukocytes, 0-3 RBC, >20 bacteria,   CMP: Na 146, K 4.5, Cl 110, " CO2 23, Glucose 121, Ca 8.8, BUN 30, Cr 1.4, ALP 87, ALT/AST 25/17            Review of patient's allergies indicates:   Allergen Reactions    Penicillins Hives     Other reaction(s): Hives     Past Medical History:   Diagnosis Date    Acquired hypothyroidism 2/20/2013    Acute deep vein thrombosis (DVT) of popliteal vein of right lower extremity 07/01/2017    Acute saddle pulmonary embolism without acute cor pulmonale 7/2/2017    Benign non-nodular prostatic hyperplasia without lower urinary tract symptoms     Closed intertrochanteric fracture of right femur s/p IMN on 7/3/17 7/1/2017    Essential hypertension     HEARING LOSS     uses hearing aids    Macular degeneration - Both Eyes 1/15/2013    Mixed hyperlipidemia 10/30/2012    Paroxysmal atrial fibrillation 7/2/2017    Partial symptomatic epilepsy with complex partial seizures, not intractable, without status epilepticus     Peripheral vascular disease 10/30/2012    Posterior vitreous detachment 1/15/2013    Pseudophakia of both eyes 1/15/2013    S/P AVR (aortic valve replacement) 10/30/2012    Scoliosis (and kyphoscoliosis), idiopathic     Stroke     Vascular dementia without behavioral disturbance 10/9/2012    Vitamin D deficiency disease 7/6/2017     Past Surgical History:   Procedure Laterality Date    AORTIC VALVE REPLACEMENT  08/2011    23 mm porcine AVR    CARDIAC CATHETERIZATION  6/11    mild non-obstructive CAD    CATARACT EXTRACTION, BILATERAL      CHOLECYSTECTOMY      TON FILTER PLACEMENT  07/03/2017    Consider removal in 2 months     HEMORRHOID SURGERY      INTERTROCHANTERIC HIP FRACTURE SURGERY Right 07/03/2017    IM nail     Family History   Problem Relation Age of Onset    COPD Father     COPD Brother      Social History   Substance Use Topics    Smoking status: Never Smoker    Smokeless tobacco: Never Used    Alcohol use No     Review of Systems   Unable to perform ROS: Mental status change       Physical  "Exam     Initial Vitals [08/09/17 0031]   BP Pulse Resp Temp SpO2   (!) 140/76 82 16 98.2 °F (36.8 °C) 100 %      MAP       97.33         Physical Exam    Nursing note and vitals reviewed.  Constitutional: He appears well-developed and well-nourished. He is not diaphoretic.   HENT:   Head: Normocephalic and atraumatic.   Mouth/Throat: Oropharynx is clear and moist.   Eyes: EOM are normal. Pupils are equal, round, and reactive to light.   Neck: Normal range of motion. No tracheal deviation present.   Cardiovascular: Normal rate and intact distal pulses.   Murmur heard.  Difficult to auscultate.  Patient non-cooperative w/ exam.   Pulmonary/Chest: No respiratory distress.   Difficult to auscultate.   Patient non-cooperative w/ exam.   Abdominal: He exhibits distension and mass. There is no tenderness.   Pt w/ large palpable mass (presumed bladder).  Ultrasound of bladder showed severe distention. 16cm x 23cm   Musculoskeletal: Normal range of motion. He exhibits no tenderness.   Neurological:   Pt alert. Disoriented.    Skin: Skin is warm and dry. Capillary refill takes less than 2 seconds.   Psychiatric:   Pt w/ abnormal mentation.  Disoriented.  Aggressive language.  Does not participate w/ exam.  Does not answer questions.  Pt is hard of hearing.  Does not understand situation.  Asked RN aid to "push knife through my chest".         ED Course   Procedures  Labs Reviewed   CBC W/ AUTO DIFFERENTIAL - Abnormal; Notable for the following:        Result Value    RBC 3.78 (*)     Hemoglobin 11.3 (*)     Hematocrit 35.4 (*)     MCHC 31.9 (*)     RDW 14.9 (*)     All other components within normal limits   URINALYSIS, REFLEX TO URINE CULTURE - Abnormal; Notable for the following:     Color, UA Red (*)     Appearance, UA Cloudy (*)     Protein, UA 2+ (*)     Occult Blood UA 1+ (*)     Leukocytes, UA 3+ (*)     All other components within normal limits    Narrative:     Preferred Collection Type->Urine, Clean Catch "   URINALYSIS MICROSCOPIC - Abnormal; Notable for the following:     WBC, UA 95 (*)     WBC Clumps, UA Few (*)     Bacteria, UA Moderate (*)     All other components within normal limits    Narrative:     Preferred Collection Type->Urine, Clean Catch   COMPREHENSIVE METABOLIC PANEL - Abnormal; Notable for the following:     Chloride 114 (*)     CO2 19 (*)     Glucose 130 (*)     Albumin 2.9 (*)     eGFR if  56.3 (*)     eGFR if non  48.7 (*)     All other components within normal limits   PHOSPHORUS - Abnormal; Notable for the following:     Phosphorus 2.5 (*)     All other components within normal limits   CULTURE, BLOOD   CULTURE, BLOOD   CULTURE, URINE   LACTIC ACID, PLASMA   PROTIME-INR   TSH   MAGNESIUM   PHENOBARBITAL LEVEL   PHENOBARBITAL LEVEL    Narrative:     add on Phenobarbital Order #[id number] per Dr garcia @ 08/09/2017    12:19    SODIUM, URINE, RANDOM   CREATININE, URINE, RANDOM                   APC / Resident Notes:   PGY-1 MDM    Pt is a 88 y.o. male presenting with altered mental status.     On physical exam, patient w/ severely distended bladder; verified via bedside ultrasound.    DDx includes progressive dementia, delirium associated w/ endocrine (thyroid), infectious (UTI, other), neurologic (stroke), metabolic, and other etiologies, as well as psychiatric disorders.    Based on the clinical picture, I am most concerned delerium of  etiology (urinary retention) or infectious (UTI).  Patient is afebrile, normotensive, and not tachycardic.  Less concerned for sepsis.    Based on my assessment, I will conduct a broad workup considering the patient's many comorbidities.     Mina Rogers MD  Emergency Medicine, PGY-1  1:55 AM 8/9/2017    PGY-1 UPDATE:    RN able to get barton catheter placed and IMMEDIATELY DRAINED 1675cc denise-colored urine; turbid.  Etiologies for and the pain/discomfort associated w/ this urinary retention may be playing role in patients'  altered state.    Pt verbally abusive and held arm gesturing he was to hit RN.  He continues demonstrate that he is disoriented and unaware of the present situation.   He is not letting the RN obtain vascular access.    At this moment, I feel that patient is a danger to self and others and that he lacks capacity to make his own medical decisions.    I am consulting psychiatry for evaluation.    Mina Rogers MD  Emergency Medicine, PGY-1  2:31 AM 8/9/2017    PGY-1 UPDATE:    Patient reassessed and more calm.  I discussed with him his situation and he was ameniable to let me take care of him.  He agreed to let nursing put in IV line.  Labs sent.    Mina Rogers MD  Emergency Medicine, PGY-1  3:45 AM 8/9/2017    PGY-1 UPDATE:    Patient reassessed and although not combative, still confused.  Has barton but wants to get up to urinate (has broken hip and has been bed bound for some time).  He continues to lack insight on his situation nor does he have proper judgment/thought process.    Labs starting to post.  Negative lactate (1.1).  WBC 7.  TSH & INR within normal limits.    Mina Rogers MD  Emergency Medicine, PGY-1  4:45 AM 8/9/2017    PGY-1 UPDATE:    Consulted medicine for admission of patient w/ AMS, possible cause being UTI.    Patient treated in outpatient setting (at rehab facility) for 2 days w/ bactrim.  Presents to ED still w/ UA positive for infection.  Will treat patient for complicated UTI (nosocomial, w/ retention) and will administer 1g Ceftriaxone.  I recognize that patient has history of PCN allergy w/ hives.    Mina Rogers MD  Emergency Medicine, PGY-1  5:18 AM 8/9/2017           Attending Attestation:   Physician Attestation Statement for Resident:  As the supervising MD   Physician Attestation Statement: I have personally seen and examined this patient.   I agree with the above history. -:   As the supervising MD I agree with the above PE.    As the supervising MD I agree with  the above treatment, course, plan, and disposition.   -: Imp: altered mental status, aggressive behavior, hx dementia - r/o delirium, psychiatric condition, metabolic or infections disturbance, medication side effect, other.  Initially, is noted to have apparent significant urinary retention which certainly could be contributing to sxs.  Will place barton.  Check labs, consult psych, admit psych vs hospital med.    I have reviewed and agree with the residents interpretation of the following: lab data.  I have reviewed the following: old records at this facility and records from a referring facility.            Attending ED Notes:   Endorsed at shift change pending labs and psych consult, dispo.            ED Course     Clinical Impression:   The primary encounter diagnosis was Complicated UTI (urinary tract infection). Diagnoses of Urinary retention and Delirium were also pertinent to this visit.                           Neo Perkins MD  08/09/17 [id number]

## 2017-08-09 NOTE — ASSESSMENT & PLAN NOTE
- likely septic in nature 2/2 to UTI  - PEC placed in ED, now has 1:1 sitter  - psych consulted for SI- cannot evaluate while he is acutely delirious; will re-consult if he remains suicidal following medical treatment  - delirium and fall precautions

## 2017-08-09 NOTE — H&P
[facility] Medicine  History & Physical    Patient Name: Virgil Melo Jr.  MRN: [id number]  Admission Date: 8/9/2017  Attending Physician: Tyler Adan MD   Primary Care Provider: Luis A Milton MD    [facility] Medicine Team: [facility] MED 1 Donald Carr MD     Patient information was obtained from relative(s), past medical records and ER records.     Subjective:     Principal Problem:Complicated UTI (urinary tract infection)    Chief Complaint:   Chief Complaint   Patient presents with    transfer      pt presents to the ed from [facility] for eval of altered mental status         HPI: Mr Melo is an 87 y/o M with a history of epilepsy, vascular dementia (on memantine), AF and PE (on eliquis), recent hip fracture with surgical repair on 7/3, and recent UTI for which he was taking bactrim presents to the ED with altered mental status. History provided by daughter. She reports that his mental status has declined in recent years and for the last two weeks he has exhibited aggression, combativeness, and suicidal ideations. He was taken to [facility] last week, but was not admitted as he was not exhibiting SI at the time. He was started on Bactrim for a UTI two days ago at the rehab facility. Daughter reports that he was at his recent baseline 2 days ago, but she received a call from his rehab facility last night stating that he had become more altered with further SI and combativeness.     In the ED, pt was disoriented, aggressive, and abusive toward staff, so PEC placed and psych consulted. UA was concerning for continued UTI, so pt started on ceftriaxone. Pt also had suprapubic tenderness and palpable bladder on physical exam- Archer was placed and drained 1675cc of slightly blood-tinged urine. Currently patient is calm and cooperative, but daughter states that he is more confused now than at baseline.     Past Medical History:   Diagnosis Date    Acquired  hypothyroidism 2/20/2013    Acute deep vein thrombosis (DVT) of popliteal vein of right lower extremity 07/01/2017    Acute saddle pulmonary embolism without acute cor pulmonale 7/2/2017    Benign non-nodular prostatic hyperplasia without lower urinary tract symptoms     Closed intertrochanteric fracture of right femur s/p IMN on 7/3/17 7/1/2017    Essential hypertension     HEARING LOSS     uses hearing aids    Macular degeneration - Both Eyes 1/15/2013    Mixed hyperlipidemia 10/30/2012    Paroxysmal atrial fibrillation 7/2/2017    Partial symptomatic epilepsy with complex partial seizures, not intractable, without status epilepticus     Peripheral vascular disease 10/30/2012    Posterior vitreous detachment 1/15/2013    Pseudophakia of both eyes 1/15/2013    S/P AVR (aortic valve replacement) 10/30/2012    Scoliosis (and kyphoscoliosis), idiopathic     Stroke     Vascular dementia without behavioral disturbance 10/9/2012    Vitamin D deficiency disease 7/6/2017       Past Surgical History:   Procedure Laterality Date    AORTIC VALVE REPLACEMENT  08/2011    23 mm porcine AVR    CARDIAC CATHETERIZATION  6/11    mild non-obstructive CAD    CATARACT EXTRACTION, BILATERAL      CHOLECYSTECTOMY      TON FILTER PLACEMENT  07/03/2017    Consider removal in 2 months     HEMORRHOID SURGERY      INTERTROCHANTERIC HIP FRACTURE SURGERY Right 07/03/2017    IM nail       Review of patient's allergies indicates:   Allergen Reactions    Penicillins Hives     Tolerated ceftriaxone       No current facility-administered medications on file prior to encounter.      Current Outpatient Prescriptions on File Prior to Encounter   Medication Sig    acetaminophen (TYLENOL) 325 MG tablet Take 2 tablets (650 mg total) by mouth every 6 (six) hours as needed (Mild to moderate pain).    apixaban 5 mg Tab Take 2 tablets (10mg) by mouth twice daily for 5 more days with end date of 7/12/2017 then decrease dose to  1 tablet (5mg) my mouth twice daily on 7/13/2017 and continue indefinitely for treatment of PE and also for stroke prevention for atrial fibrillation.    aspirin (ECOTRIN) 325 MG EC tablet Take 1 tablet by mouth Daily.    atorvastatin (LIPITOR) 40 MG tablet TAKE 1 TABLET BY MOUTH EVERY DAY    calcium carbonate (OS-ROSA) 500 mg calcium (1,250 mg) tablet Take 2 tablets (1,000 mg total) by mouth once daily.    carvedilol (COREG) 25 MG tablet Take 1 tablet (25 mg total) by mouth 2 (two) times daily.    ergocalciferol (ERGOCALCIFEROL) 50,000 unit Cap Take 1 capsule (50,000 Units total) by mouth Every Friday.    gabapentin (NEURONTIN) 800 MG tablet Take 1 tablet (800 mg total) by mouth 3 (three) times daily.    hydrocodone-acetaminophen 5-325mg (NORCO) 5-325 mg per tablet Take 1 tablet by mouth every 4 (four) hours as needed for Pain.    levetiracetam (KEPPRA) 750 MG Tab Take 1 tablet (750 mg total) by mouth 2 (two) times daily.    levothyroxine (SYNTHROID) 137 MCG Tab tablet TAKE 1 TABLET BY MOUTH EVERY DAY    memantine (NAMENDA) 10 MG Tab 1 BID    phenobarbital (LUMINAL) 32.4 MG tablet Take 1 tablet (32.4 mg total) by mouth 3 (three) times daily.    senna-docusate 8.6-50 mg (PERICOLACE) 8.6-50 mg per tablet Take 1 tablet by mouth 2 (two) times daily as needed for Constipation.     Family History     Problem Relation (Age of Onset)    COPD Father, Brother        Social History Main Topics    Smoking status: Never Smoker    Smokeless tobacco: Never Used    Alcohol use No    Drug use: No    Sexual activity: Not on file     Review of Systems   Unable to perform ROS: Dementia     Objective:     Vital Signs (Most Recent):  Temp: 98.2 °F (36.8 °C) (08/09/17 0031)  Pulse: 87 (08/09/17 0552)  Resp: 20 (08/09/17 0503)  BP: (!) 173/81 (08/09/17 0552)  SpO2: (!) 94 % (08/09/17 0552) Vital Signs (24h Range):  Temp:  [98.2 °F (36.8 °C)] 98.2 °F (36.8 °C)  Pulse:  [68-87] 87  Resp:  [16-20] 20  SpO2:  [94 %-100 %] 94  %  BP: (140-175)/(65-92) 173/81     Weight: 90.7 kg (200 lb)  Body mass index is 27.09 kg/m².    Physical Exam   Constitutional: He appears well-developed and well-nourished. No distress.   HENT:   Head: Normocephalic and atraumatic.   Eyes: EOM are normal. Pupils are equal, round, and reactive to light.   Neck: Normal range of motion. No JVD present.   Cardiovascular: Normal rate and intact distal pulses.    No murmur heard.  Irregularly irregular rhythm   Pulmonary/Chest: Effort normal and breath sounds normal. No respiratory distress.   Abdominal: Soft. Bowel sounds are normal. He exhibits mass (over suprapubic area). He exhibits no distension. There is tenderness (over suprapubic area). There is no guarding.   Genitourinary:   Genitourinary Comments: Archer in place   Musculoskeletal: He exhibits no edema.   Neurological: He is alert.   Oriented to self, not to place, time or situation   Skin: Skin is warm and dry. He is not diaphoretic.   Psychiatric:   Calm, cooperative, and following commands during my interview  Confused, perseverates and difficult to redirect        Significant Labs:   CBC:   Recent Labs  Lab 08/09/17  0342   WBC 7.79   HGB 11.3*   HCT 35.4*        CMP:   Recent Labs  Lab 08/09/17  0718      K 3.9   *   CO2 19*   *   BUN 23   CREATININE 1.3   CALCIUM 8.7   PROT 7.4   ALBUMIN 2.9*   BILITOT 0.3   ALKPHOS 98   AST 17   ALT 22   ANIONGAP 11   EGFRNONAA 48.7*     Coagulation:   Recent Labs  Lab 08/09/17  0342   INR 1.1     Lactic Acid:   Recent Labs  Lab 08/09/17  0342   LACTATE 1.1     Magnesium:   Recent Labs  Lab 08/09/17  0718   MG 1.9     TSH:   Recent Labs  Lab 08/09/17  0342   TSH 1.419     Urine Studies:   Recent Labs  Lab 08/09/17  0211   COLORU Red*   APPEARANCEUA Cloudy*   PHUR 7.0   SPECGRAV 1.015   PROTEINUA 2+*   GLUCUA Negative   KETONESU Negative   BILIRUBINUA Negative   OCCULTUA 1+*   NITRITE Negative   UROBILINOGEN Negative   LEUKOCYTESUR 3+*   RBCUA 3    WBCUA 95*   BACTERIA Moderate*   HYALINECASTS 0     All pertinent labs within the past 24 hours have been reviewed.    Significant Imaging: I have reviewed all pertinent imaging results/findings within the past 24 hours.    Assessment/Plan:     * Complicated UTI (urinary tract infection)    - likely 2/2 to urinary retention  - failed outpatient treatment with bactrim  - recent alteration in mental status may be due to septic encephalopathy  - UA showed 3+ leuks, 95 WBCs, few WBC clumps, moderate bacteria  - urine and blood cultures pending  - started on ceftriaxone        Encephalopathy    - likely septic in nature 2/2 to UTI  - PEC placed in ED, now has 1:1 sitter  - psych consulted for SI- cannot evaluate while he is acutely delirious; will re-consult if he remains suicidal following medical treatment  - delirium and fall precautions        Urinary retention    - patient with palpable mass and tenderness in suprapubic region in ED  - Archer placed and drained 1675cc   - will continue Archer for now and plan for voiding trial in 1-2 days; if pt fails voiding trial, will start tamsulosin        ESTHER (acute kidney injury)    - baseline Cr 0.9-1.0; presented with Cr 1.3  - likely obstructive in nature 2/2 acute urinary retention  - received 1 L NS in ED, will bolus another liter and start MIVF  - urine studies ordered  - avoiding nephrotoxins, NSAIDs, IV contrast        Partial symptomatic epilepsy with complex partial seizures, not intractable, without status epilepticus    - continuing home keppra, phenobarbital, and gabapentin        Essential hypertension    - continuing home coreg 25 BID        Vascular dementia without behavioral disturbance    - continuing home namenda        Acquired hypothyroidism    - TSH wnl  - continuing home synthroid        Paroxysmal atrial fibrillation    - in AF on EKG and physical exam  - continuing home eliquis          VTE Risk Mitigation         Ordered     apixaban tablet 5 mg  2  times daily     Route:  Oral        08/09/17 0822             Donald Carr MD  Department of [facility] Medicine   [facility]

## 2017-08-09 NOTE — ASSESSMENT & PLAN NOTE
- patient with palpable mass and tenderness in suprapubic region in ED  - Archer placed and drained 1675cc   - will continue Archer for now and plan for voiding trial in 1-2 days; if pt fails voiding trial, will start tamsulosin

## 2017-08-09 NOTE — ED NOTES
"Pt was found to still be on bed pan when night shift left. Not sure how long patient had been on bedpan.  Pt states that his "behind hurts"  "

## 2017-08-10 PROBLEM — R31.9 HEMATURIA: Status: ACTIVE | Noted: 2017-01-01

## 2017-08-10 NOTE — ASSESSMENT & PLAN NOTE
- baseline Cr 0.9-1.0; presented with Cr 1.3  - FENa 5.6% consistent with post-renal etiology  - Cr improved to baseline with Archer and IVF  - avoiding nephrotoxins, NSAIDs, IV contrast

## 2017-08-10 NOTE — PLAN OF CARE
Problem: Physical Therapy Goal  Goal: Physical Therapy Goal  Goals to be met by: 17     Patient will increase functional independence with mobility by performin. Supine to sit with MInimal Assistance  2. Sit to supine with MInimal Assistance  3. Sit to stand transfer with Minimal Assistance  4. Gait  x 10 feet with Maximum Assistance using Rolling Walker.   5. Stand for 2 minutes with Moderate Assistance using Rolling Walker for UE support, while performing reaching activity.   6. Lower extremity exercise program x10 reps with assistance as needed          PT evaluation completed. POC and goals established.    Roxana Curran, PT, DPT  8/10/2017

## 2017-08-10 NOTE — PROGRESS NOTES
Archer pulled at 1415.    Output recorded in I/O FS.    MD informed of dark red blood D/C post-cath removal.

## 2017-08-10 NOTE — ASSESSMENT & PLAN NOTE
- likely 2/2 to urinary retention  - failed outpatient treatment with bactrim  - recent alteration in mental status may be due to septic encephalopathy  - UA showed 3+ leuks, 95 WBCs, few WBC clumps, moderate bacteria  - BCx with no growth to date; UCx with >100K presumptive E coli  - now on ceftriaxone

## 2017-08-10 NOTE — ASSESSMENT & PLAN NOTE
- patient with palpable mass and tenderness in suprapubic region in ED  - Archer placed and drained 1675cc   - will remove Archer for voiding trial this afternoon  - started tamsulosin

## 2017-08-10 NOTE — SUBJECTIVE & OBJECTIVE
"Interval History: NAEON. This morning, pt remains confused and agitated. Says he feels "not worth a damn" and continues to perseverate on feeling like he  to use the bathroom, despite repeatedly explaining to him that he has a Archer in place. Continues to report lower abdominal pain.     Review of Systems  Objective:     Vital Signs (Most Recent):  Temp: 98.8 °F (37.1 °C) (08/10/17 0758)  Pulse: 88 (08/10/17 0758)  Resp: 17 (08/10/17 0758)  BP: 129/68 (08/10/17 0758)  SpO2: 95 % (08/10/17 0758) Vital Signs (24h Range):  Temp:  [98 °F (36.7 °C)-100.1 °F (37.8 °C)] 98.8 °F (37.1 °C)  Pulse:  [80-90] 88  Resp:  [16-18] 17  SpO2:  [93 %-95 %] 95 %  BP: (129-154)/(67-87) 129/68     Weight: 86.8 kg (191 lb 4.8 oz)  Body mass index is 25.94 kg/m².    Intake/Output Summary (Last 24 hours) at 08/10/17 0840  Last data filed at 08/10/17 0511   Gross per 24 hour   Intake              530 ml   Output             3200 ml   Net            -2670 ml      Physical Exam   Constitutional: He appears well-developed and well-nourished. No distress.   HENT:   Head: Normocephalic and atraumatic.   Eyes: EOM are normal. Pupils are equal, round, and reactive to light.   Neck: Normal range of motion. No JVD present.   Cardiovascular: Normal rate and intact distal pulses.    No murmur heard.  Pulmonary/Chest: Effort normal and breath sounds normal. No respiratory distress.   Abdominal: Soft. Bowel sounds are normal. He exhibits no distension and no mass. There is tenderness (over suprapubic area). There is no guarding.   Genitourinary:   Genitourinary Comments: Archer in place   Musculoskeletal: He exhibits no edema.   Neurological: He is alert.   Oriented to self, not to place, time or situation   Skin: Skin is warm and dry. He is not diaphoretic.   Psychiatric:   Agitated, confused, perseverates and difficult to redirect       Significant Labs:   Blood Culture:   Recent Labs  Lab 08/09/17  0718   LABBLOO No Growth to date  No Growth to date "     CBC:   Recent Labs  Lab 08/09/17  0342 08/10/17  0523   WBC 7.79 11.48   HGB 11.3* 10.8*   HCT 35.4* 34.0*    280     CMP:   Recent Labs  Lab 08/09/17  0718 08/10/17  0523    141   K 3.9 3.6   * 113*   CO2 19* 20*   * 115*   BUN 23 17   CREATININE 1.3 1.0   CALCIUM 8.7 8.1*   PROT 7.4  --    ALBUMIN 2.9* 2.5*   BILITOT 0.3  --    ALKPHOS 98  --    AST 17  --    ALT 22  --    ANIONGAP 11 8   EGFRNONAA 48.7* >60.0     Urine Culture:   Recent Labs  Lab 08/09/17  0211   LABURIN PRESUMPTIVE E COLI>100,000 cfu/mlIdentification and susceptibility pending     All pertinent labs within the past 24 hours have been reviewed.    Significant Imaging: I have reviewed all pertinent imaging results/findings within the past 24 hours.

## 2017-08-10 NOTE — PROGRESS NOTES
[facility] Medicine  Progress Note    Patient Name: Virgil Melo Jr.  MRN: [id number]  Patient Class: IP- Inpatient   Admission Date: 8/9/2017  Length of Stay: 1 days  Attending Physician: Tyler Adan MD  Primary Care Provider: Luis A Milton MD    [facility] Medicine Team: [facility] MED 1 Donald Carr MD    Subjective:     Principal Problem:Complicated UTI (urinary tract infection)    HPI:  Mr Melo is an 89 y/o M with a history of epilepsy, vascular dementia (on memantine), AF and PE (on eliquis), recent hip fracture with surgical repair on 7/3, and recent UTI for which he was taking bactrim presents to the ED with altered mental status. History provided by daughter. She reports that his mental status has declined in recent years and for the last two weeks he has exhibited aggression, combativeness, and suicidal ideations. He was taken to [facility] last week, but was not admitted as he was not exhibiting SI at the time. He was started on Bactrim for a UTI two days ago at the rehab facility. Daughter reports that he was at his recent baseline 2 days ago, but she received a call from his rehab facility last night stating that he had become more altered with further SI and combativeness.     In the ED, pt was disoriented, aggressive, and abusive toward staff, so PEC placed and psych consulted. UA was concerning for continued UTI, so pt started on ceftriaxone. Pt also had suprapubic tenderness and palpable bladder on physical exam- Archer was placed and drained 1675cc of slightly blood-tinged urine. Currently patient is calm and cooperative, but daughter states that he is more confused now than at baseline.     Hospital Course:  Presented with OPC in place after being combative with SI reported at nursing home. Pt continued to be aggressive in ED and PEC was palced. UA was positive and pt was started on ceftriaxone. He had lower abdominal tenderness and palpable  "bladder- Archer was placed and drained 1675cc. Also w mild ESTHER, Cr 1.3 from baseline of 0.9-1.0, likely obstructive in etiology and improved with Archer placement and IV fluids. Psych saw patient but was unable to evaluate for SI in the setting of acute delirium.     Interval History: NAEON. This morning, pt remains confused and agitated. Says he feels "not worth a damn" and continues to perseverate on feeling like he  to use the bathroom, despite repeatedly explaining to him that he has a Archer in place. Continues to report lower abdominal pain.     Review of Systems  Objective:     Vital Signs (Most Recent):  Temp: 98.8 °F (37.1 °C) (08/10/17 0758)  Pulse: 88 (08/10/17 0758)  Resp: 17 (08/10/17 0758)  BP: 129/68 (08/10/17 0758)  SpO2: 95 % (08/10/17 0758) Vital Signs (24h Range):  Temp:  [98 °F (36.7 °C)-100.1 °F (37.8 °C)] 98.8 °F (37.1 °C)  Pulse:  [80-90] 88  Resp:  [16-18] 17  SpO2:  [93 %-95 %] 95 %  BP: (129-154)/(67-87) 129/68     Weight: 86.8 kg (191 lb 4.8 oz)  Body mass index is 25.94 kg/m².    Intake/Output Summary (Last 24 hours) at 08/10/17 0840  Last data filed at 08/10/17 0511   Gross per 24 hour   Intake              530 ml   Output             3200 ml   Net            -2670 ml      Physical Exam   Constitutional: He appears well-developed and well-nourished. No distress.   HENT:   Head: Normocephalic and atraumatic.   Eyes: EOM are normal. Pupils are equal, round, and reactive to light.   Neck: Normal range of motion. No JVD present.   Cardiovascular: Normal rate and intact distal pulses.    No murmur heard.  Pulmonary/Chest: Effort normal and breath sounds normal. No respiratory distress.   Abdominal: Soft. Bowel sounds are normal. He exhibits no distension and no mass. There is tenderness (over suprapubic area). There is no guarding.   Genitourinary:   Genitourinary Comments: Archer in place   Musculoskeletal: He exhibits no edema.   Neurological: He is alert.   Oriented to self, not to place, time " or situation   Skin: Skin is warm and dry. He is not diaphoretic.   Psychiatric:   Agitated, confused, perseverates and difficult to redirect       Significant Labs:   Blood Culture:   Recent Labs  Lab 08/09/17 0718   LABBLOO No Growth to date  No Growth to date     CBC:   Recent Labs  Lab 08/09/17  0342 08/10/17  0523   WBC 7.79 11.48   HGB 11.3* 10.8*   HCT 35.4* 34.0*    280     CMP:   Recent Labs  Lab 08/09/17  0718 08/10/17  0523    141   K 3.9 3.6   * 113*   CO2 19* 20*   * 115*   BUN 23 17   CREATININE 1.3 1.0   CALCIUM 8.7 8.1*   PROT 7.4  --    ALBUMIN 2.9* 2.5*   BILITOT 0.3  --    ALKPHOS 98  --    AST 17  --    ALT 22  --    ANIONGAP 11 8   EGFRNONAA 48.7* >60.0     Urine Culture:   Recent Labs  Lab 08/09/17  0211   LABURIN PRESUMPTIVE E COLI>100,000 cfu/mlIdentification and susceptibility pending     All pertinent labs within the past 24 hours have been reviewed.    Significant Imaging: I have reviewed all pertinent imaging results/findings within the past 24 hours.    Assessment/Plan:      * Complicated UTI (urinary tract infection)    - likely 2/2 to urinary retention  - failed outpatient treatment with bactrim  - recent alteration in mental status may be due to septic encephalopathy  - UA showed 3+ leuks, 95 WBCs, few WBC clumps, moderate bacteria  - BCx with no growth to date; UCx with >100K presumptive E coli  - now on ceftriaxone        Encephalopathy    - likely septic in nature 2/2 to UTI  - PEC discontinued, pt will need 1:1 sitter or avasys monitoring  - psych consulted for SI- cannot evaluate while he is acutely delirious; will re-consult if he remains suicidal following medical treatment   - delirium and fall precautions        Urinary retention    - patient with palpable mass and tenderness in suprapubic region in ED  - Archer placed and drained 1675cc   - will remove Archer for voiding trial this afternoon  - started tamsulosin        ESTHER (acute kidney injury)     - baseline Cr 0.9-1.0; presented with Cr 1.3  - FENa 5.6% consistent with post-renal etiology  - Cr improved to baseline with Archer and IVF  - avoiding nephrotoxins, NSAIDs, IV contrast        Partial symptomatic epilepsy with complex partial seizures, not intractable, without status epilepticus    - continuing home keppra, phenobarbital, and gabapentin        Essential hypertension    - continuing home coreg 25 BID        Vascular dementia without behavioral disturbance    - continuing home namenda        Acquired hypothyroidism    - TSH wnl  - continuing home synthroid        Paroxysmal atrial fibrillation    - continuing home eliquis          VTE Risk Mitigation         Ordered     apixaban tablet 5 mg  2 times daily     Route:  Oral        08/10/17 1152              Donald Carr MD  Department of Hospital Medicine   [facility]

## 2017-08-10 NOTE — ASSESSMENT & PLAN NOTE
- likely septic in nature 2/2 to UTI  - PEC discontinued, pt will need 1:1 sitter or avasys monitoring  - psych consulted for SI- cannot evaluate while he is acutely delirious; will re-consult if he remains suicidal following medical treatment   - delirium and fall precautions

## 2017-08-10 NOTE — PT/OT/SLP EVAL
Physical Therapy  Evaluation    Virgil Melo Jr.   MRN: [id number]   Admitting Diagnosis: Complicated UTI (urinary tract infection)    PT Received On: 08/10/17  PT Start Time: 1332     PT Stop Time: 1349    PT Total Time (min): 17 min       Billable Minutes:  Evaluation 17     Personal factors/comorbidities: HLD; AFib; PVD; dementia; h/o stroke. The listed co-morbidities and personal factors impact pt's current level and progress with functional mobility and independence.   Body systems elements affected: lower extremities, upper extremities, trunk, musculoskeletal system, neuromuscular system, integumentary system; orientation/level of consciousness  Impairments: see assessment below  Clinical Presentation: stable  Functional Outcome Tools: AMPAC, MMT, ROM  Evaluation Complexity Level: low      Diagnosis: Complicated UTI (urinary tract infection)      Past Medical History:   Diagnosis Date    Acquired hypothyroidism 2/20/2013    Acute deep vein thrombosis (DVT) of popliteal vein of right lower extremity 07/01/2017    Acute saddle pulmonary embolism without acute cor pulmonale 7/2/2017    Benign non-nodular prostatic hyperplasia without lower urinary tract symptoms     Closed intertrochanteric fracture of right femur s/p IMN on 7/3/17 7/1/2017    Essential hypertension     HEARING LOSS     uses hearing aids    Macular degeneration - Both Eyes 1/15/2013    Mixed hyperlipidemia 10/30/2012    Paroxysmal atrial fibrillation 7/2/2017    Partial symptomatic epilepsy with complex partial seizures, not intractable, without status epilepticus     Peripheral vascular disease 10/30/2012    Posterior vitreous detachment 1/15/2013    Pseudophakia of both eyes 1/15/2013    S/P AVR (aortic valve replacement) 10/30/2012    Scoliosis (and kyphoscoliosis), idiopathic     Stroke     Vascular dementia without behavioral disturbance 10/9/2012    Vitamin D deficiency disease 7/6/2017      Past Surgical History:  "  Procedure Laterality Date    AORTIC VALVE REPLACEMENT  08/2011    23 mm porcine AVR    CARDIAC CATHETERIZATION  6/11    mild non-obstructive CAD    CATARACT EXTRACTION, BILATERAL      CHOLECYSTECTOMY      TON FILTER PLACEMENT  07/03/2017    Consider removal in 2 months     HEMORRHOID SURGERY      INTERTROCHANTERIC HIP FRACTURE SURGERY Right 07/03/2017    IM nail       Referring physician: Donald Carr MD  Date referred to PT: 08/09/17       General Precautions: Standard, fall  Orthopedic Precautions: N/A   Braces: N/A       Do you have any cultural, spiritual, Rastafari conflicts, given your current situation?: none stated    Patient History:  Pt confused and unreliable historian regarding PLOF. No family present at time of visit.   Lives in: Nursing facility  PLOF/Level of assist: UTD at this time; pt reports sitting in w/c, but unable to explain how he transfers to the w/c  DME: likely all DME in place    Roles/responsibilities: father, grandfather, friend  Hobbies/Interests: football    Assist available upon d/c: TBD      Subjective:  Communicated with RN prior to session. Pt appropriate for therapy.   Pt agreeable for PT evaluation; found supine in bed with sitter present.       Chief Complaint: tired, "I'm not worth a damn"   Patient goals: To rest    No pain reported.     Objective:   Patient found with: telemetry, peripheral IV, barton catheter     Pt requires redirection with tasks. Perseverates on having to use the bathroom, despite PT and sitter stating he has a barton.      Cognitive Exam:  Oriented to: Person only; stated 2048 for year, April for month, and no answer stated for place.   PT provided correct daily orientation.     Follows Commands/attention: Easily distracted and Follows one-step commands  Communication: clear/fluent  Safety awareness/insight to disability: impaired    Physical Exam:  Postural examination/scapula alignment: Rounded shoulder, Head forward, Posterior " pelvic tilt, Abnormal trunk flexion and Kyphosis    Skin integrity: Visible skin intact  Edema: None noted     Sensation:   Intact    Upper Extremity Range of Motion:  Right Upper Extremity: WFL  Left Upper Extremity: WFL    Upper Extremity Strength:  Right Upper Extremity: WFL  Left Upper Extremity: WFL    Lower Extremity Range of Motion:  Right Lower Extremity: WFL  Left Lower Extremity: WFL    Lower Extremity Strength:  Right Lower Extremity: WFL as demonstrated with functional mobility  Left Lower Extremity: WFL as demonstrated with functional mobility     Fine motor coordination:  Intact    Gross motor coordination: WFL    Functional Mobility:  Bed Mobility:  Supine to Sit:  Maximum Assistance assist with trunk and LE's; HOB elevated  Sit to supine: Minimal Assistance for trunk to assist with eccentric control and safety  Scooting: Minimal Assistance towards EOB    Transfers:   Sit to stand:Moderate Assistance with Rolling Walker from EOB    Gait:   Unable this visit due to pt request to sit down      Balance:  Static Sitting: Minimal Assistance posterior lean and impulsive  Dynamic Sitting: Minimal Assistance   Static Standing: Moderate Assistance B knee flexion, slight hip flexion, and forward trunk; able to obtain upright posture with tactile facilitation, but unable to sustain for >5 sec.   Dynamic Standing: Activity did not occur         AM-PAC 6 CLICK MOBILITY  How much help from another person does this patient currently need?   1 = Unable, Total/Dependent Assistance  2 = A lot, Maximum/Moderate Assistance  3 = A little, Minimum/Contact Guard/Supervision  4 = None, Modified [place]/Independent    Turning over in bed (including adjusting bedclothes, sheets and blankets)?: 3  Sitting down on and standing up from a chair with arms (e.g., wheelchair, bedside commode, etc.): 2  Moving from lying on back to sitting on the side of the bed?: 2  Moving to and from a bed to a chair (including a wheelchair)?:  2  Need to walk in hospital room?: 2  Climbing 3-5 steps with a railing?: 1  Total Score: 12     AM-PAC Raw Score CMS G-Code Modifier Level of Impairment Assistance   6 % Total / Unable   7 - 9 CM 80 - 100% Maximal Assist   10 - 14 CL 60 - 80% Moderate Assist   15 - 19 CK 40 - 60% Moderate Assist   20 - 22 CJ 20 - 40% Minimal Assist   23 CI 1-20% SBA / CGA   24 CH 0% Independent/ Mod I     Patient left supine with all lines intact, call button in reach and sitter present.    Assessment:   Virgil Melo Jr. is a 88 y.o. male with a medical diagnosis of Complicated UTI (urinary tract infection) and presents with decrease endurance, strength, balance, coordination, and cognition impairing pt's safety with all mobility.  Pt tolerated session well with no LOB or c/o pain.  Pt would benefit from continued skilled physical therapy for the listed impairments to improve functional independence and overall safety with mobility prior to d/c. PT recommends d/c disposition to return to previous facility for continued PT and OT intervention for strengthening and safety with mobility.     Education:  Education provided to pt regarding: PT role/POC; safety with mobiltiy. Verbalized understanding; needs reinforcement.     Whiteboard updated with correct mobility information. RN/PCT notified.  Transfer with therapy only at this time.     .    Rehab identified problem list/impairments: Rehab identified problem list/impairments: weakness, gait instability, impaired balance, impaired functional mobilty, decreased upper extremity function, decreased lower extremity function, impaired endurance, impaired cognition, decreased safety awareness, impaired coordination    Rehab potential is good.    Activity tolerance: Good    Discharge recommendations: Discharge Facility/Level Of Care Needs: nursing facility, skilled     Barriers to discharge: Barriers to Discharge: None    Equipment recommendations: Equipment Needed After  Discharge: none     GOALS:    Physical Therapy Goals        Problem: Physical Therapy Goal    Goal Priority Disciplines Outcome Goal Variances Interventions   Physical Therapy Goal     PT/OT, PT      Description:  Goals to be met by: 17     Patient will increase functional independence with mobility by performin. Supine to sit with MInimal Assistance  2. Sit to supine with MInimal Assistance  3. Sit to stand transfer with Minimal Assistance  4. Gait  x 10 feet with Maximum Assistance using Rolling Walker.   5. Stand for 2 minutes with Moderate Assistance using Rolling Walker for UE support, while performing reaching activity.   6. Lower extremity exercise program x10 reps with assistance as needed                      PLAN:    Patient to be seen 4 x/week to address the above listed problems via gait training, therapeutic activities, therapeutic exercises, neuromuscular re-education  Plan of Care expires: 17  Plan of Care reviewed with: patient    Functional Assessment Tool Used: ampac  Score: 12  Functional Limitation: Mobility: Walking and moving around  Mobility: Walking and Moving Around Current Status (): CL  Mobility: Walking and Moving Around Goal Status (): CL     Roxana Curran, PT  08/10/2017

## 2017-08-10 NOTE — PHYSICIAN QUERY
PT Name: Virgil Melo Jr.  MR #: [id number]    Physician Query Form - Cause and Effect Relationship Clarification      CDS/: Monica Garibay RN,BSN,CDI              Contact information:EXT. [phone]    This form is a permanent document in the medical record.     Query Date: August 10, 2017    By submitting this query, we are merely seeking further clarification of documentation. Please utilize your independent clinical judgment when addressing the question(s) below.    The Medical record contains the following:  Supporting Clinical Findings   Location in record                                                                           Mr. Virgil Melo Jr. is a 88 y.o. male who is followed for Complicated UTI (urinary tract infection).                                                                       08/09/17 H&P                                                                         Gram negative yenifer, non-lactose    > 100,000 cfu/ml   Identification and susceptibility pending   Remark: Preferred Collection Type->Urine, Clean Catch                                                                                                                              08/09/17 Urine Culture         Provider, please clarify if there is any correlation between ____UTI_____________________ and ___GNR non-lactose fermenter_______________.           Are the conditions:     [x  ] Due to or associated with each other     [  ] Unrelated to each other     [  ] Other (Please Specify): _________________________     [  ] Clinically Undetermined

## 2017-08-10 NOTE — PLAN OF CARE
Extended Emergency Contact Information  Primary Emergency Contact: Marta Sanches  Address: [address] [place] [id number] [facility]  Mobile Phone: [phone]  Relation: Daughter    Luis A YANG Milton MD  [address] / [place] [address]    Future Appointments  Date Time Provider Department Center   8/16/2017 3:30 PM Diana Martines PA-C [facility]   9/27/2017 3:00 PM [facility] FRA CAR [facility]     Payor: HU[employer] MANAGED MEDICARE / Plan: [employer] HE[employer] HMO SNP / Product Type: Medicare Advantage /       [facility] [address] [place], [place] - [address] [facility] [address]  [facility] [address]  Phone: [phone] Fax: [phone]       08/10/17 1630   Discharge Assessment   Assessment Type Discharge Planning Assessment   Confirmed/corrected address and phone number on facesheet? Yes   Assessment information obtained from? Caregiver;Medical Record   Expected Length of Stay (days) 3   Communicated expected length of stay with patient/caregiver yes   Prior to hospitilization cognitive status: Not Oriented to Place;Not Oriented to Time   Prior to hospitalization functional status: Partially Dependent   Current cognitive status: Not Oriented to Place;Not Oriented to Time   Current Functional Status: Completely Dependent   Arrived From nursing home   Lives With facility resident   Able to Return to Prior Arrangements unable to determine at this time (comments)   Is patient able to care for self after discharge? No   Readmission Within The Last 30 Days no previous admission in last 30 days   Patient currently being followed by outpatient case management? No   Patient currently receives home health services? No   Does the patient currently use HME? Yes   Patient currently receives private duty nursing? N/A   Patient currently receives any other outside agency services? No    Equipment Currently Used at Home shower chair;cane, straight   Do you have any problems affording any of your prescribed medications? No   Is the patient taking medications as prescribed? yes   Do you have any financial concerns preventing you from receiving the healthcare you need? No   Does the patient have transportation to healthcare appointments? Yes   Transportation Available family or friend will provide   On Dialysis? No   Does the patient receive services at the [facility]madin Clinic? No   Are there any open cases? No   Discharge Plan A Return to nursing home;Skilled Nursing Facility   Discharge Plan B New Nursing Home placement - [facility] care facility;Skilled Nursing Facility   Patient/Family In Agreement With Plan unable to assess

## 2017-08-10 NOTE — HOSPITAL COURSE
Presented with OPC in place after being combative with SI reported at nursing home. Pt continued to be aggressive in ED and PEC was palced. UA was positive and pt was started on ceftriaxone. He had lower abdominal tenderness and palpable bladder- Archer was placed and drained 1675cc. Also w mild ESTHER, Cr 1.3 from baseline of 0.9-1.0, likely obstructive in etiology and improved with Archer placement and IV fluids. Psych saw patient but was unable to evaluate for SI in the setting of acute delirium. Ucx with MSSA and pansensitive citrobacter. Has been retaining urine- Archer placed and started tamsulosin.    08/14: Had an episode of agitation yesterday evening. Given zyprexa.   Doing better compared to yesterday. Planned for discharge.

## 2017-08-10 NOTE — ED NOTES
Pt linens changed and placed in diaper.Sitter at bedside. belongings at bedside. No acute distress noted at this time.

## 2017-08-11 PROBLEM — D64.9 ANEMIA: Status: ACTIVE | Noted: 2017-01-01

## 2017-08-11 NOTE — ASSESSMENT & PLAN NOTE
- likely 2/2 to urinary retention. failed outpatient treatment with bactrim  - U cx with citrobacter and enterococcus  - cont ceftriaxone IV for citrobacter  - IV vanco added for enterococcus  - barton placed back in for retention, tamsulosin cont

## 2017-08-11 NOTE — ASSESSMENT & PLAN NOTE
- holding eliquis given hematuria and anemia (with hbg drop of 1 g)  - restart tomorrow based on labs

## 2017-08-11 NOTE — SUBJECTIVE & OBJECTIVE
Interval History:     Not febrile over night, no leukocytosis. Remains confused but not agitated any longer, has underlying dementia. Ucx as above, adjusted abx. Archer placed back in 2/2 retention with large amt of residuals.     Review of Systems   Constitutional: Negative for fatigue and fever.   Respiratory: Negative for cough, shortness of breath and wheezing.    Cardiovascular: Negative for chest pain, palpitations and leg swelling.   Gastrointestinal: Positive for abdominal pain. Negative for abdominal distention.   Genitourinary: Positive for difficulty urinating and hematuria.   Neurological: Negative for weakness.   Psychiatric/Behavioral: Positive for confusion.     Objective:     Vital Signs (Most Recent):  Temp: 98.3 °F (36.8 °C) (08/11/17 1100)  Pulse: 67 (08/11/17 1100)  Resp: 18 (08/11/17 1100)  BP: (!) 167/76 (08/11/17 1100)  SpO2: 95 % (08/11/17 1100) Vital Signs (24h Range):  Temp:  [97.5 °F (36.4 °C)-98.7 °F (37.1 °C)] 98.3 °F (36.8 °C)  Pulse:  [67-75] 67  Resp:  [18] 18  SpO2:  [92 %-96 %] 95 %  BP: (108-186)/(52-78) 167/76     Weight: 86.8 kg (191 lb 4.8 oz)  Body mass index is 25.94 kg/m².    Intake/Output Summary (Last 24 hours) at 08/11/17 1648  Last data filed at 08/11/17 1430   Gross per 24 hour   Intake              410 ml   Output              950 ml   Net             -540 ml      Physical Exam   Constitutional: He appears well-developed and well-nourished. No distress.   HENT:   Head: Normocephalic and atraumatic.   Eyes: EOM are normal. Pupils are equal, round, and reactive to light.   Neck: Normal range of motion. No JVD present.   Cardiovascular: Normal rate and intact distal pulses.    No murmur heard.  Pulmonary/Chest: Effort normal and breath sounds normal. No respiratory distress.   Abdominal: Soft. Bowel sounds are normal. He exhibits no distension and no mass. There is tenderness (over suprapubic area). There is no guarding.   Musculoskeletal: He exhibits no edema.    Neurological: He is alert. No cranial nerve deficit.   Oriented to self, not to place, time or situation   Skin: Skin is warm and dry. He is not diaphoretic.   Psychiatric:   Agitated, confused, perseverates and difficult to redirect       Significant Labs:   CBC:   Recent Labs  Lab 08/10/17  0523 08/11/17  0436   WBC 11.48 8.99   HGB 10.8* 9.8*   HCT 34.0* 30.3*    254     CMP:   Recent Labs  Lab 08/10/17  0523 08/11/17  0436    142   K 3.6 3.5   * 112*   CO2 20* 20*   * 131*   BUN 17 21   CREATININE 1.0 1.1   CALCIUM 8.1* 8.1*   ALBUMIN 2.5* 2.4*   ANIONGAP 8 10   EGFRNONAA >60.0 59.6*     Blood culture [[id number]] Collected: 08/09/17 0718   Order Status: Completed Specimen: Blood from Peripheral, Antecubital, Right Updated: 08/11/17 1012    Blood Culture, Routine No Growth to date    Blood Culture, Routine No Growth to date    Blood Culture, Routine No Growth to date   Urine culture [[id number]]  Collected: 08/09/17 0211   Order Status: Completed Specimen: Urine Updated: 08/11/17 1147    Urine Culture, Routine --    CITROBACTER KOSERI   > 100,000 cfu/ml     Urine Culture, Routine --    ENTEROCOCCUS SPECIES   > 100,000 cfu/ml   Identification and susceptibility pending    Narrative:

## 2017-08-11 NOTE — PROGRESS NOTES
[facility] Hospital Medicine  Progress Note    Patient Name: Virgil Melo Jr.  MRN: [id number]  Patient Class: IP- Inpatient   Admission Date: 8/9/2017  Length of Stay: 2 days  Attending Physician: Tyler Adan MD  Primary Care Provider: Luis A Milton MD    [facility] Medicine Team: [facility] [place] [facility] Leydi Ortiz MD    Subjective:     Principal Problem:Complicated UTI (urinary tract infection)    HPI:  Mr Melo is an 87 y/o M with a history of epilepsy, vascular dementia (on memantine), AF and PE (on eliquis), recent hip fracture with surgical repair on 7/3, and recent UTI for which he was taking bactrim presents to the ED with altered mental status. History provided by daughter. She reports that his mental status has declined in recent years and for the last two weeks he has exhibited aggression, combativeness, and suicidal ideations. He was taken to [facility] last week, but was not admitted as he was not exhibiting SI at the time. He was started on Bactrim for a UTI two days ago at the rehab facility. Daughter reports that he was at his recent baseline 2 days ago, but she received a call from his rehab facility last night stating that he had become more altered with further SI and combativeness.     In the ED, pt was disoriented, aggressive, and abusive toward staff, so PEC placed and psych consulted. UA was concerning for continued UTI, so pt started on ceftriaxone. Pt also had suprapubic tenderness and palpable bladder on physical exam- Archer was placed and drained 1675cc of slightly blood-tinged urine. Currently patient is calm and cooperative, but daughter states that he is more confused now than at baseline.     Hospital Course:  Presented with OPC in place after being combative with SI reported at nursing home. Pt continued to be aggressive in ED and PEC was palced. UA was positive and pt was started on ceftriaxone. He had lower abdominal tenderness and palpable  bladder- Archer was placed and drained 1675cc. Also w mild ESTHER, Cr 1.3 from baseline of 0.9-1.0, likely obstructive in etiology and improved with Archer placement and IV fluids. Psych saw patient but was unable to evaluate for SI in the setting of acute delirium. Ucx with citrobacter and enterococcus , retaining urine    Interval History:     Not febrile over night, no leukocytosis. Remains confused but not agitated any longer, has underlying dementia. Ucx as above, adjusted abx. Archer placed back in 2/2 retention with large amt of residuals.     Review of Systems   Constitutional: Negative for fatigue and fever.   Respiratory: Negative for cough, shortness of breath and wheezing.    Cardiovascular: Negative for chest pain, palpitations and leg swelling.   Gastrointestinal: Positive for abdominal pain. Negative for abdominal distention.   Genitourinary: Positive for difficulty urinating and hematuria.   Neurological: Negative for weakness.   Psychiatric/Behavioral: Positive for confusion.     Objective:     Vital Signs (Most Recent):  Temp: 98.3 °F (36.8 °C) (08/11/17 1100)  Pulse: 67 (08/11/17 1100)  Resp: 18 (08/11/17 1100)  BP: (!) 167/76 (08/11/17 1100)  SpO2: 95 % (08/11/17 1100) Vital Signs (24h Range):  Temp:  [97.5 °F (36.4 °C)-98.7 °F (37.1 °C)] 98.3 °F (36.8 °C)  Pulse:  [67-75] 67  Resp:  [18] 18  SpO2:  [92 %-96 %] 95 %  BP: (108-186)/(52-78) 167/76     Weight: 86.8 kg (191 lb 4.8 oz)  Body mass index is 25.94 kg/m².    Intake/Output Summary (Last 24 hours) at 08/11/17 1648  Last data filed at 08/11/17 1430   Gross per 24 hour   Intake              410 ml   Output              950 ml   Net             -540 ml      Physical Exam   Constitutional: He appears well-developed and well-nourished. No distress.   HENT:   Head: Normocephalic and atraumatic.   Eyes: EOM are normal. Pupils are equal, round, and reactive to light.   Neck: Normal range of motion. No JVD present.   Cardiovascular: Normal rate and intact  distal pulses.    No murmur heard.  Pulmonary/Chest: Effort normal and breath sounds normal. No respiratory distress.   Abdominal: Soft. Bowel sounds are normal. He exhibits no distension and no mass. There is tenderness (over suprapubic area). There is no guarding.   Musculoskeletal: He exhibits no edema.   Neurological: He is alert. No cranial nerve deficit.   Oriented to self, not to place, time or situation   Skin: Skin is warm and dry. He is not diaphoretic.   Psychiatric:   Agitated, confused, perseverates and difficult to redirect       Significant Labs:   CBC:   Recent Labs  Lab 08/10/17  0523 08/11/17  0436   WBC 11.48 8.99   HGB 10.8* 9.8*   HCT 34.0* 30.3*    254     CMP:   Recent Labs  Lab 08/10/17  0523 08/11/17  0436    142   K 3.6 3.5   * 112*   CO2 20* 20*   * 131*   BUN 17 21   CREATININE 1.0 1.1   CALCIUM 8.1* 8.1*   ALBUMIN 2.5* 2.4*   ANIONGAP 8 10   EGFRNONAA >60.0 59.6*     Blood culture [[id number]] Collected: 08/09/17 0718   Order Status: Completed Specimen: Blood from Peripheral, Antecubital, Right Updated: 08/11/17 1012    Blood Culture, Routine No Growth to date    Blood Culture, Routine No Growth to date    Blood Culture, Routine No Growth to date   Urine culture [[id number]]  Collected: 08/09/17 0211   Order Status: Completed Specimen: Urine Updated: 08/11/17 1147    Urine Culture, Routine --    CITROBACTER KOSERI   > 100,000 cfu/ml     Urine Culture, Routine --    ENTEROCOCCUS SPECIES   > 100,000 cfu/ml   Identification and susceptibility pending    Narrative:           Assessment/Plan:      * Complicated UTI (urinary tract infection)    - likely 2/2 to urinary retention. failed outpatient treatment with bactrim  - U cx with citrobacter and enterococcus  - cont ceftriaxone IV for citrobacter  - IV vanco added for enterococcus  - barton placed back in for retention, tamsulosin cont        Septic encephalopathy    - as above          Encephalopathy    - likely  septic in nature 2/2 to UTI  - PEC discontinued, pt will need 1:1 sitter or avasys monitoring  - psych consulted for SI- cannot evaluate while he is acutely delirious; will re-consult if he remains suicidal following medical treatment   - delirium and fall precautions        ESTHER (acute kidney injury)    - baseline Cr 0.9-1.0; presented with Cr 1.3  - FENa 5.6% consistent with post-renal etiology  - Cr improved to baseline with Barton and IVF  - avoiding nephrotoxins, NSAIDs, IV contrast        Hematuria    - may be 2/2 UTI and barton trauma  - hold ASA and eliquis          Urinary retention    - patient with palpable mass and tenderness in suprapubic region in ED  - Barton re-placed  - started tamsulosin        Vascular dementia without behavioral disturbance    - continuing home namenda        Essential hypertension    - continuing home coreg 25 BID        Anemia    - hbg drop by 1 gram, to 9.8, may be dilutional or 2/2 hematuria  - hold anti coag  - trend labs          Acquired hypothyroidism    - TSH wnl  - continuing home synthroid        Paroxysmal atrial fibrillation    - holding eliquis given hematuria and anemia (with hbg drop of 1 g)  - restart tomorrow based on labs        Partial symptomatic epilepsy with complex partial seizures, not intractable, without status epilepticus    - continuing home keppra, phenobarbital, and gabapentin          VTE Risk Mitigation     None              Leydi Ortiz MD  Department of Hospital Medicine   [facility]

## 2017-08-11 NOTE — ASSESSMENT & PLAN NOTE
- hbg drop by 1 gram, to 9.8, may be dilutional or 2/2 hematuria  - hold anti coag  - trend labs

## 2017-08-11 NOTE — ASSESSMENT & PLAN NOTE
- patient with palpable mass and tenderness in suprapubic region in ED  - Archer re-placed  - started tamsulosin

## 2017-08-11 NOTE — PT/OT/SLP EVAL
Occupational Therapy  Evaluation    Virgil Melo Jr.   MRN: [id number]   Admitting Diagnosis: Complicated UTI (urinary tract infection)    OT Date of Treatment: 08/10/17   OT Start Time: 0854  OT Stop Time: 0920  OT Total Time (min): 26 min    Billable Minutes:  Evaluation 26    Diagnosis: Complicated UTI (urinary tract infection)     Past Medical History:   Diagnosis Date    Acquired hypothyroidism 2/20/2013    Acute deep vein thrombosis (DVT) of popliteal vein of right lower extremity 07/01/2017    Acute saddle pulmonary embolism without acute cor pulmonale 7/2/2017    Benign non-nodular prostatic hyperplasia without lower urinary tract symptoms     Closed intertrochanteric fracture of right femur s/p IMN on 7/3/17 7/1/2017    Essential hypertension     HEARING LOSS     uses hearing aids    Macular degeneration - Both Eyes 1/15/2013    Mixed hyperlipidemia 10/30/2012    Paroxysmal atrial fibrillation 7/2/2017    Partial symptomatic epilepsy with complex partial seizures, not intractable, without status epilepticus     Peripheral vascular disease 10/30/2012    Posterior vitreous detachment 1/15/2013    Pseudophakia of both eyes 1/15/2013    S/P AVR (aortic valve replacement) 10/30/2012    Scoliosis (and kyphoscoliosis), idiopathic     Stroke     Vascular dementia without behavioral disturbance 10/9/2012    Vitamin D deficiency disease 7/6/2017      Past Surgical History:   Procedure Laterality Date    AORTIC VALVE REPLACEMENT  08/2011    23 mm porcine AVR    CARDIAC CATHETERIZATION  6/11    mild non-obstructive CAD    CATARACT EXTRACTION, BILATERAL      CHOLECYSTECTOMY      TON FILTER PLACEMENT  07/03/2017    Consider removal in 2 months     HEMORRHOID SURGERY      INTERTROCHANTERIC HIP FRACTURE SURGERY Right 07/03/2017    IM nail       Referring physician: Donald Carr MD  Date referred to OT: 8/9/2017    General Precautions: Standard, fall  Orthopedic Precautions:  N/A  Braces: N/A    Do you have any cultural, spiritual, Christianity conflicts, given your current situation?: None stated     Patient History:  Living Environment  Lives With: facility resident  Living Arrangements: assisted living  Transportation Available: family or friend will provide  Living Environment Comment: Daughter present to confirm PLOF and living environment information.  Pt lives in assisted living facility; everything is one level and bathroom contains walk-in shower.  PTA pt was (I) with ADLs and utilizing a SPC for mobility.   Equipment Currently Used at Home: cane, straight    Prior level of function:   Bed Mobility/Transfers: needs assist  Grooming: independent  Bathing: independent  Upper Body Dressing: independent  Lower Body Dressing: independent  Toileting: independent  Home Management Skills: independent  Leisure and Hobbies: sports (especially football)     Dominant hand: right    Subjective:  Communicated with RN prior to session.    Chief Complaint: Feels like he needs to have a bowel movement  Patient/Family stated goals: Resume PLOF    Pain/Comfort  Pain Rating 1: 0/10  Pain Rating Post-Intervention 2: 0/10    Objective:  Patient found with: telemetry, peripheral IV, barton catheter.  Sitter and daughter present.    Cognitive Exam:  Oriented to: Person, Place, Time and Situation  Follows Commands/attention: Follows two-step commands  Communication: clear/fluent  Memory:  Deficits noted; pt oriented, but unable to provide accurate information regarding living environment.  Safety awareness/insight to disability: impaired  Coping skills/emotional control: Appropriate to situation    Visual/perceptual:  Intact    Physical Exam:  Postural examination/scapula alignment: Rounded shoulder and Head forward  Skin integrity: Visible skin intact  Edema: None noted     Sensation:   Intact    Upper Extremity Range of Motion:  Right Upper Extremity: WNL  Left Upper Extremity: WNL    Upper Extremity  Strength:  Right Upper Extremity: WNL  Left Upper Extremity: WNL   Strength: 5/5 both hands    Fine motor coordination:   Intact    Gross motor coordination: WFL    Functional Mobility:  Bed Mobility:  Rolling/Turning to Left: Contact guard assistance  Rolling/Turning Right: Contact guard assistance  Scooting/Bridging: Contact Guard Assistance  Supine to Sit: Contact Guard Assistance  Sit to Supine: Minimum Assistance (for LE management)    Transfers:   Sit <> Stand:  Min A x 2 trials from EOB  -1st trial:  Pt stood with severe posterior lean and slipped backwards.  OT facilitated safe landing to seated position on bed.  -2nd trial:  Pt able to maintain upright position for 20 seconds; posterior lean noted requiring cues to extend hips and distribute body weight evenly.    Functional Ambulation: Pt took 1 side steps to left towards HOB with Mod A.  Gait instability noted.      Activities of Daily Living:    LE Dressing Level of Assistance: Maximum assistance for donning socks while seated EOB; able to initiate forward movement with (B) LE.  UE dressing:  Mod A for donning gown on front while supine with HOB elevated (Pt only wearing scrub shirt upon arrival).  Some assist required to find arm holes.      Balance:   Static Sit: FAIR: Maintains without assist, but unable to take any challenges   Dynamic Sit: FAIR: Cannot move trunk without losing balance  Static Stand: POOR+: Needs MINIMAL assist to maintain  Dynamic stand: POOR: N/A    Therapeutic Activities and Exercises:  *Pt and daughter educated on role of OT and POC discussed    AM-PAC 6 CLICK ADL  How much help from another person does this patient currently need?  1 = Unable, Total/Dependent Assistance  2 = A lot, Maximum/Moderate Assistance  3 = A little, Minimum/Contact Guard/Supervision  4 = None, Modified Ferguson/Independent    Putting on and taking off regular lower body clothing? : 2  Bathing (including washing, rinsing, drying)?: 2  Toileting,  "which includes using toilet, bedpan, or urinal? : 2  Putting on and taking off regular upper body clothing?: 3  Taking care of personal grooming such as brushing teeth?: 3  Eating meals?: 3  Total Score: 15    AM-PAC Raw Score CMS "G-Code Modifier Level of Impairment Assistance   6 % Total / Unable   7 - 9 CM 80 - 100% Maximal Assist   10-14 CL 60 - 80% Moderate Assist   15 - 19 CK 40 - 60% Moderate Assist   20 - 22 CJ 20 - 40% Minimal Assist   23 CI 1-20% SBA / CGA   24 CH 0% Independent/ Mod I       Patient left supine with call button in reach and daughter and sitter present    Assessment:  Virgil Melo Jr. is a 88 y.o. male with a medical diagnosis of Complicated UTI (urinary tract infection) and presents with decreased endurance, impaired cognition, and decreased balance impacting performance with ADLs and mobility.  Pt alert and agreeable to participating in evaluation.  Pt able to follow majority of commands throughout session; however, increased cues required for sit to stand transfer and for ambulation 2*  moderate postural sway with severe posterior lean noted during these tasks.  Pt demonstrates strength and ROM in (B) UE needed for ADLs that is WFL, and reports being (I) with ADLs PTA.  Pt would benefit from skilled OT services to address problems listed below and increase independence with ADLs.  It is recommended that pt return to assisted living facility and receive skilled therapy services there.        Rehab identified problem list/impairments: Rehab identified problem list/impairments: weakness, impaired endurance, impaired self care skills, impaired functional mobilty, impaired balance, decreased coordination, decreased safety awareness, impaired cognition, gait instability    Rehab potential is good.    Activity tolerance: Good    Discharge recommendations: Discharge Facility/Level Of Care Needs: nursing facility, skilled     Barriers to discharge: Barriers to Discharge: " None    Equipment recommendations: none     GOALS:    Occupational Therapy Goals        Problem: Occupational Therapy Goal    Goal Priority Disciplines Outcome Interventions   Occupational Therapy Goal     OT, PT/OT     Description:  Goals to be met by: 8/24/2017 (2 weeks)     Patient will increase functional independence with ADLs by performing:    UE Dressing with Minimal Assistance.  LE Dressing with Moderate Assistance.  Grooming while seated with Contact Guard Assistance.  Toileting from bedside commode with Minimal Assistance for hygiene and clothing management.   Supine to sit with Contact Guard Assistance.  Toilet transfer to bedside commode with Minimal Assistance.                      PLAN:  Patient to be seen 4 x/week to address the above listed problems via self-care/home management, therapeutic activities, therapeutic exercises  Plan of Care expires: 09/09/17  Plan of Care reviewed with: patient, daughter         Lyn FINN GUILLERMO Manzano  08/10/2017

## 2017-08-11 NOTE — PLAN OF CARE
MICAH received call from Aubree with [facility] reporting that pt's daughter came to their facility on yesterday and took all of pt's belongings and told them that they plan on taking pt home once discharged from the hospital. [facility] has discharged pt from their facility per pt's daughter request.     MICAH placed call to pt's daughter, Mrs. Sanches [phone], she verified that pt will not be returning to [facility] at discharge and will be returning to his assisted living called [facility] (memory care unit), however she is concerned about his barton care and believes he could benefit from home health services. Mrs. Sanches also informed MICAH that pt will need transportation at discharge.  MICAH informed pt's daughter expected discharge date is Monday 8/14/17.

## 2017-08-11 NOTE — PLAN OF CARE
Problem: Patient Care Overview  Goal: Plan of Care Review  Outcome: Ongoing (interventions implemented as appropriate)  VSS and afebrile. Free from falls and injury. Maintained alertness, but disoriented x4.    Post-Archer protocol in place for voiding by 2030.    Monitor for cont'd gross hematuria/pain/disorientation/adverse events.

## 2017-08-11 NOTE — PLAN OF CARE
Problem: Occupational Therapy Goal  Goal: Occupational Therapy Goal  Goals to be met by: 8/24/2017 (2 weeks)     Patient will increase functional independence with ADLs by performing:    UE Dressing with Minimal Assistance.  LE Dressing with Moderate Assistance.  Grooming while seated with Contact Guard Assistance.  Toileting from bedside commode with Minimal Assistance for hygiene and clothing management.   Supine to sit with Contact Guard Assistance.  Toilet transfer to bedside commode with Minimal Assistance.      OT evaluation complete and POC established.  It is recommended that pt return to previous assisted living facility and receive skilled therapy services there.    GUILLERMO Palma  8/10/2017

## 2017-08-12 PROBLEM — N17.9 AKI (ACUTE KIDNEY INJURY): Status: RESOLVED | Noted: 2017-01-01 | Resolved: 2017-01-01

## 2017-08-12 PROBLEM — D64.9 ANEMIA: Status: ACTIVE | Noted: 2017-01-01

## 2017-08-12 NOTE — PLAN OF CARE
Problem: Patient Care Overview  Goal: Plan of Care Review  Outcome: Ongoing (interventions implemented as appropriate)  Patient remained free of injury for entire shift, no fall occurences.Patient remained afebrile throughout shift, vital signs stable.Patient's skin  warm, dry, and intact with no new tears or abrasions. Patient constantly says that he needs the restroom despite barton catheter. Urine is concentrated but output is WNL for kidney function.

## 2017-08-12 NOTE — SUBJECTIVE & OBJECTIVE
Interval History: NAEON. Remains confused, continues to perseverate on having to use the bathroom despite Archer being in place. Also continues to c/o lower abdominal pain and feels constipated. Last BM yesterday. Minimal blood in Archer bag.     Review of Systems  Objective:     Vital Signs (Most Recent):  Temp: 97.9 °F (36.6 °C) (08/12/17 0724)  Pulse: 75 (08/12/17 0724)  Resp: 17 (08/12/17 0724)  BP: (!) 164/73 (08/12/17 0724)  SpO2: 97 % (08/12/17 0724) Vital Signs (24h Range):  Temp:  [97.6 °F (36.4 °C)-98.2 °F (36.8 °C)] 97.9 °F (36.6 °C)  Pulse:  [63-78] 75  Resp:  [16-18] 17  SpO2:  [95 %-97 %] 97 %  BP: (125-164)/(68-81) 164/73     Weight: 86.8 kg (191 lb 4.8 oz)  Body mass index is 25.94 kg/m².    Intake/Output Summary (Last 24 hours) at 08/12/17 1304  Last data filed at 08/12/17 0600   Gross per 24 hour   Intake                0 ml   Output              900 ml   Net             -900 ml      Physical Exam   Constitutional: He appears well-developed and well-nourished. No distress.   HENT:   Head: Normocephalic and atraumatic.   Eyes: EOM are normal. Pupils are equal, round, and reactive to light.   Neck: Normal range of motion. No JVD present.   Cardiovascular: Normal rate and intact distal pulses.    No murmur heard.  Pulmonary/Chest: Effort normal and breath sounds normal. No respiratory distress.   Abdominal: Soft. Bowel sounds are normal. He exhibits no distension and no mass. There is tenderness (over suprapubic area). There is no guarding.   Genitourinary:   Genitourinary Comments: Archer in place, minimal amount of blood in Archer bag   Musculoskeletal: He exhibits no edema.   Neurological: He is alert. No cranial nerve deficit.   Oriented to self, not to place, time or situation   Skin: Skin is warm and dry. He is not diaphoretic.   Psychiatric:   Intermittently agitated, confused, perseverates and difficult to redirect       Significant Labs:   CBC:   Recent Labs  Lab 08/11/17  0436 08/12/17  041    WBC 8.99 7.86   HGB 9.8* 10.3*   HCT 30.3* 32.0*    259     CMP:   Recent Labs  Lab 08/11/17  0436 08/12/17  0411    140   K 3.5 4.1   * 111*   CO2 20* 21*   * 96   BUN 21 18   CREATININE 1.1 1.0   CALCIUM 8.1* 8.3*   ALBUMIN 2.4* 2.4*   ANIONGAP 10 8   EGFRNONAA 59.6* >60.0       Significant Imaging: I have reviewed all pertinent imaging results/findings within the past 24 hours.

## 2017-08-12 NOTE — ASSESSMENT & PLAN NOTE
- likely 2/2 to urinary retention. failed outpatient treatment with bactrim  - Ucx with pansensitive citrobacter and enterococcus (sensitivities pending), will follow up final results  - cont ceftriaxone IV for citrobacter  - IV vanco added for enterococcus  - barton replaced for retention, tamsulosin continued

## 2017-08-12 NOTE — NURSING
MD Team paged: abnormal test results/dispositon sheet from [facility] ED visit.    Dr. Carr notified of fax. Forms placed in pt's [facility].

## 2017-08-12 NOTE — ASSESSMENT & PLAN NOTE
- likely septic in nature 2/2 to UTI  - PEC discontinued, continuing avasys monitoring  - psych consulted for SI- cannot evaluate while he is acutely delirious; will re-consult if he remains suicidal following medical treatment   - delirium and fall precautions

## 2017-08-12 NOTE — PROGRESS NOTES
[facility] Hospital Medicine  Progress Note    Patient Name: Virgil Melo Jr.  MRN: [id number]  Patient Class: IP- Inpatient   Admission Date: 8/9/2017  Length of Stay: 3 days  Attending Physician: Tyler Adan MD  Primary Care Provider: Luis A Milton MD    [facility] Medicine Team: [facility]OSP MED 1 Donald Carr MD    Subjective:     Principal Problem:Complicated UTI (urinary tract infection)    HPI:  Mr Melo is an 89 y/o M with a history of epilepsy, vascular dementia (on memantine), AF and PE (on eliquis), recent hip fracture with surgical repair on 7/3, and recent UTI for which he was taking bactrim presents to the ED with altered mental status. History provided by daughter. She reports that his mental status has declined in recent years and for the last two weeks he has exhibited aggression, combativeness, and suicidal ideations. He was taken to [facility] last week, but was not admitted as he was not exhibiting SI at the time. He was started on Bactrim for a UTI two days ago at the rehab facility. Daughter reports that he was at his recent baseline 2 days ago, but she received a call from his rehab facility last night stating that he had become more altered with further SI and combativeness.     In the ED, pt was disoriented, aggressive, and abusive toward staff, so PEC placed and psych consulted. UA was concerning for continued UTI, so pt started on ceftriaxone. Pt also had suprapubic tenderness and palpable bladder on physical exam- Archer was placed and drained 1675cc of slightly blood-tinged urine. Currently patient is calm and cooperative, but daughter states that he is more confused now than at baseline.     Hospital Course:  Presented with OPC in place after being combative with SI reported at nursing home. Pt continued to be aggressive in ED and PEC was palced. UA was positive and pt was started on ceftriaxone. He had lower abdominal tenderness and palpable  bladder- Archer was placed and drained 1675cc. Also w mild ESTHER, Cr 1.3 from baseline of 0.9-1.0, likely obstructive in etiology and improved with Archer placement and IV fluids. Psych saw patient but was unable to evaluate for SI in the setting of acute delirium. Ucx with enterococcus and pansensitive citrobacter. Has been retaining urine- Archer placed and started tamsulosin.    Interval History: NAEON. Remains confused, continues to perseverate on having to use the bathroom despite Archer being in place. Also continues to c/o lower abdominal pain and feels constipated. Last BM yesterday. Minimal blood in Archer bag.     Review of Systems  Objective:     Vital Signs (Most Recent):  Temp: 97.9 °F (36.6 °C) (08/12/17 0724)  Pulse: 75 (08/12/17 0724)  Resp: 17 (08/12/17 0724)  BP: (!) 164/73 (08/12/17 0724)  SpO2: 97 % (08/12/17 0724) Vital Signs (24h Range):  Temp:  [97.6 °F (36.4 °C)-98.2 °F (36.8 °C)] 97.9 °F (36.6 °C)  Pulse:  [63-78] 75  Resp:  [16-18] 17  SpO2:  [95 %-97 %] 97 %  BP: (125-164)/(68-81) 164/73     Weight: 86.8 kg (191 lb 4.8 oz)  Body mass index is 25.94 kg/m².    Intake/Output Summary (Last 24 hours) at 08/12/17 1304  Last data filed at 08/12/17 0600   Gross per 24 hour   Intake                0 ml   Output              900 ml   Net             -900 ml      Physical Exam   Constitutional: He appears well-developed and well-nourished. No distress.   HENT:   Head: Normocephalic and atraumatic.   Eyes: EOM are normal. Pupils are equal, round, and reactive to light.   Neck: Normal range of motion. No JVD present.   Cardiovascular: Normal rate and intact distal pulses.    No murmur heard.  Pulmonary/Chest: Effort normal and breath sounds normal. No respiratory distress.   Abdominal: Soft. Bowel sounds are normal. He exhibits no distension and no mass. There is tenderness (over suprapubic area). There is no guarding.   Genitourinary:   Genitourinary Comments: Archer in place, minimal amount of blood in Archer  bag   Musculoskeletal: He exhibits no edema.   Neurological: He is alert. No cranial nerve deficit.   Oriented to self, not to place, time or situation   Skin: Skin is warm and dry. He is not diaphoretic.   Psychiatric:   Intermittently agitated, confused, perseverates and difficult to redirect       Significant Labs:   CBC:   Recent Labs  Lab 08/11/17 0436 08/12/17  0412   WBC 8.99 7.86   HGB 9.8* 10.3*   HCT 30.3* 32.0*    259     CMP:   Recent Labs  Lab 08/11/17 0436 08/12/17  0411    140   K 3.5 4.1   * 111*   CO2 20* 21*   * 96   BUN 21 18   CREATININE 1.1 1.0   CALCIUM 8.1* 8.3*   ALBUMIN 2.4* 2.4*   ANIONGAP 10 8   EGFRNONAA 59.6* >60.0       Significant Imaging: I have reviewed all pertinent imaging results/findings within the past 24 hours.    Assessment/Plan:      * Complicated UTI (urinary tract infection)    - likely 2/2 to urinary retention. failed outpatient treatment with bactrim  - Ucx with pansensitive citrobacter and enterococcus (sensitivities pending), will follow up final results  - cont ceftriaxone IV for citrobacter  - IV vanco added for enterococcus  - barton replaced for retention, tamsulosin continued        Encephalopathy    - likely septic in nature 2/2 to UTI  - PEC discontinued, continuing avasys monitoring  - psych consulted for SI- cannot evaluate while he is acutely delirious; will re-consult if he remains suicidal following medical treatment   - delirium and fall precautions        Urinary retention    - patient with palpable mass and tenderness in suprapubic region in ED  - Barton re-placed  - started tamsulosin        Partial symptomatic epilepsy with complex partial seizures, not intractable, without status epilepticus    - continuing home keppra, phenobarbital, and gabapentin        Essential hypertension    - continuing home coreg 25 BID        Vascular dementia without behavioral disturbance    - continuing home namenda        Acquired hypothyroidism     - TSH wnl  - continuing home synthroid        Paroxysmal atrial fibrillation    - hgb stable so restarted eliquis        Anemia    - hgb stable since admission  - minimal amount of blood in Barton bag  - will continue to monitor        Hematuria    - may be 2/2 UTI and barton trauma  - H/H stable so eliquis restarted        Septic encephalopathy    - as above          VTE Risk Mitigation         Ordered     apixaban tablet 5 mg  2 times daily     Route:  Oral        08/12/17 0846              Donald Carr MD  Department of Hospital Medicine   [facility]

## 2017-08-12 NOTE — PLAN OF CARE
Problem: Patient Care Overview  Goal: Plan of Care Review  Outcome: Ongoing (interventions implemented as appropriate)  VSS and afebrile. Free from injury and falls. Alert, but disoriented x4.    Monitor for cont'd blood-tinged urine/pain/adverse events.

## 2017-08-13 NOTE — SUBJECTIVE & OBJECTIVE
Interval History: NAEON. Calm and pleasant this morning. Reports feeling good and denies abdominal pain. No blood in Archer bag.     Review of Systems  Objective:     Vital Signs (Most Recent):  Temp: 96.5 °F (35.8 °C) (08/13/17 0823)  Pulse: 64 (08/13/17 0823)  Resp: 18 (08/13/17 0823)  BP: (!) 177/88 (08/13/17 0823)  SpO2: 96 % (08/13/17 0823) Vital Signs (24h Range):  Temp:  [96.5 °F (35.8 °C)-98.1 °F (36.7 °C)] 96.5 °F (35.8 °C)  Pulse:  [64-75] 64  Resp:  [15-18] 18  SpO2:  [93 %-98 %] 96 %  BP: (132-177)/(64-88) 177/88     Weight: 86.8 kg (191 lb 4.8 oz)  Body mass index is 25.94 kg/m².    Intake/Output Summary (Last 24 hours) at 08/13/17 1155  Last data filed at 08/13/17 0823   Gross per 24 hour   Intake              100 ml   Output             1075 ml   Net             -975 ml      Physical Exam   Constitutional: He appears well-developed and well-nourished. No distress.   HENT:   Head: Normocephalic and atraumatic.   Eyes: EOM are normal. Pupils are equal, round, and reactive to light.   Neck: Normal range of motion. No JVD present.   Cardiovascular: Normal rate and intact distal pulses.    No murmur heard.  Pulmonary/Chest: Effort normal and breath sounds normal. No respiratory distress.   Abdominal: Soft. Bowel sounds are normal. He exhibits no distension and no mass. There is no tenderness.   Genitourinary:   Genitourinary Comments: Archer in place   Musculoskeletal: He exhibits no edema.   Neurological: He is alert. No cranial nerve deficit.   Oriented to self, not to place, time or situation   Skin: Skin is warm and dry. He is not diaphoretic.   Psychiatric:   Confused, calm and cooperative       Significant Labs:   CBC:   Recent Labs  Lab 08/12/17  0412 08/13/17  0638   WBC 7.86 7.66   HGB 10.3* 11.0*   HCT 32.0* 34.4*    260     CMP:   Recent Labs  Lab 08/12/17  0411 08/13/17  0638    139   K 4.1 4.1   * 108   CO2 21* 19*   GLU 96 90   BUN 18 16   CREATININE 1.0 1.0   CALCIUM 8.3*  8.8   ALBUMIN 2.4* 2.5*   ANIONGAP 8 12   EGFRNONAA >60.0 >60.0     Urine Culture: No results for input(s): LABURIN in the last 48 hours.  All pertinent labs within the past 24 hours have been reviewed.    Significant Imaging: I have reviewed all pertinent imaging results/findings within the past 24 hours.

## 2017-08-13 NOTE — NURSING
POC reviewed w/ pt and daughter. Verb understanding. vanc iv dc'd per md. Pt aaox2 to self/situation. Safety camera at bs. C/o pain to rectum after having soft bm. Pt given tylenol and cleaned up. Denies any pain at this time. Vs wnl. Afebrile. Decreased appetite today. Denies any n/v. Possible dc tomorrow. Will continue to monitor.

## 2017-08-13 NOTE — PROGRESS NOTES
[facility] Hospital Medicine  Progress Note    Patient Name: Virgil Melo Jr.  MRN: [id number]  Patient Class: IP- Inpatient   Admission Date: 8/9/2017  Length of Stay: 4 days  Attending Physician: Tyler Adan MD  Primary Care Provider: Luis A Milton MD    [facility] Medicine Team: [facility] MED 1 Donald Carr MD    Subjective:     Principal Problem:Complicated UTI (urinary tract infection)    HPI:  Mr Melo is an 89 y/o M with a history of epilepsy, vascular dementia (on memantine), AF and PE (on eliquis), recent hip fracture with surgical repair on 7/3, and recent UTI for which he was taking bactrim presents to the ED with altered mental status. History provided by daughter. She reports that his mental status has declined in recent years and for the last two weeks he has exhibited aggression, combativeness, and suicidal ideations. He was taken to [facility] last week, but was not admitted as he was not exhibiting SI at the time. He was started on Bactrim for a UTI two days ago at the rehab facility. Daughter reports that he was at his recent baseline 2 days ago, but she received a call from his rehab facility last night stating that he had become more altered with further SI and combativeness.     In the ED, pt was disoriented, aggressive, and abusive toward staff, so PEC placed and psych consulted. UA was concerning for continued UTI, so pt started on ceftriaxone. Pt also had suprapubic tenderness and palpable bladder on physical exam- Archer was placed and drained 1675cc of slightly blood-tinged urine. Currently patient is calm and cooperative, but daughter states that he is more confused now than at baseline.     Hospital Course:  Presented with OPC in place after being combative with SI reported at nursing home. Pt continued to be aggressive in ED and PEC was palced. UA was positive and pt was started on ceftriaxone. He had lower abdominal tenderness and palpable  bladder- Archer was placed and drained 1675cc. Also w mild ESTHER, Cr 1.3 from baseline of 0.9-1.0, likely obstructive in etiology and improved with Archer placement and IV fluids. Psych saw patient but was unable to evaluate for SI in the setting of acute delirium. Ucx with MSSA and pansensitive citrobacter. Has been retaining urine- Archer placed and started tamsulosin.    Interval History: NAEON. Calm and pleasant this morning. Reports feeling good and denies abdominal pain. No blood in Archer bag.     Review of Systems  Objective:     Vital Signs (Most Recent):  Temp: 96.5 °F (35.8 °C) (08/13/17 0823)  Pulse: 64 (08/13/17 0823)  Resp: 18 (08/13/17 0823)  BP: (!) 177/88 (08/13/17 0823)  SpO2: 96 % (08/13/17 0823) Vital Signs (24h Range):  Temp:  [96.5 °F (35.8 °C)-98.1 °F (36.7 °C)] 96.5 °F (35.8 °C)  Pulse:  [64-75] 64  Resp:  [15-18] 18  SpO2:  [93 %-98 %] 96 %  BP: (132-177)/(64-88) 177/88     Weight: 86.8 kg (191 lb 4.8 oz)  Body mass index is 25.94 kg/m².    Intake/Output Summary (Last 24 hours) at 08/13/17 1155  Last data filed at 08/13/17 0823   Gross per 24 hour   Intake              100 ml   Output             1075 ml   Net             -975 ml      Physical Exam   Constitutional: He appears well-developed and well-nourished. No distress.   HENT:   Head: Normocephalic and atraumatic.   Eyes: EOM are normal. Pupils are equal, round, and reactive to light.   Neck: Normal range of motion. No JVD present.   Cardiovascular: Normal rate and intact distal pulses.    No murmur heard.  Pulmonary/Chest: Effort normal and breath sounds normal. No respiratory distress.   Abdominal: Soft. Bowel sounds are normal. He exhibits no distension and no mass. There is no tenderness.   Genitourinary:   Genitourinary Comments: Archer in place   Musculoskeletal: He exhibits no edema.   Neurological: He is alert. No cranial nerve deficit.   Oriented to self, not to place, time or situation   Skin: Skin is warm and dry. He is not  diaphoretic.   Psychiatric:   Confused, calm and cooperative       Significant Labs:   CBC:   Recent Labs  Lab 08/12/17  0412 08/13/17  0638   WBC 7.86 7.66   HGB 10.3* 11.0*   HCT 32.0* 34.4*    260     CMP:   Recent Labs  Lab 08/12/17  0411 08/13/17  0638    139   K 4.1 4.1   * 108   CO2 21* 19*   GLU 96 90   BUN 18 16   CREATININE 1.0 1.0   CALCIUM 8.3* 8.8   ALBUMIN 2.4* 2.5*   ANIONGAP 8 12   EGFRNONAA >60.0 >60.0     Urine Culture: No results for input(s): LABURIN in the last 48 hours.  All pertinent labs within the past 24 hours have been reviewed.    Significant Imaging: I have reviewed all pertinent imaging results/findings within the past 24 hours.    Assessment/Plan:      * Complicated UTI (urinary tract infection)    - likely 2/2 to urinary retention. failed outpatient treatment with bactrim  - Ucx with pansensitive citrobacter and MSSA  - cont ceftriaxone IV, vanc stopped  - will transition to PO regimen tomorrow to complete 10 day course, likely augmentin  - barton replaced for retention, tamsulosin continued        Encephalopathy    - likely septic in nature 2/2 to UTI  - PEC discontinued, continuing avasys monitoring  - psych consulted for SI- cannot evaluate while he is acutely delirious; will re-consult if he remains suicidal following medical treatment   - delirium and fall precautions        Urinary retention    - patient with palpable mass and tenderness in suprapubic region in ED  - Barton re-placed  - started tamsulosin        Partial symptomatic epilepsy with complex partial seizures, not intractable, without status epilepticus    - continuing home keppra, phenobarbital, and gabapentin        Essential hypertension    - continuing home coreg 25 BID        Vascular dementia without behavioral disturbance    - continuing home namenda        Acquired hypothyroidism    - TSH wnl  - continuing home synthroid        Paroxysmal atrial fibrillation    - hgb stable so restarted  eliquis        Anemia    - hgb stable  - will continue to monitor        Hematuria    - may be 2/2 UTI and barton trauma  - H/H stable so eliquis restarted        Septic encephalopathy    - as above          VTE Risk Mitigation         Ordered     apixaban tablet 5 mg  2 times daily     Route:  Oral        08/12/17 0846              Donald Carr MD  Department of Hospital Medicine   [facility]

## 2017-08-13 NOTE — NURSING
Pt increasingly aggitated/confused and paranoid. Pt getting physical w/ nurses and PCT. Attempting to reorient and calm pt down. Pt given PO zyprexa. Will continue to monitor.

## 2017-08-13 NOTE — PLAN OF CARE
Problem: Patient Care Overview  Goal: Plan of Care Review  Outcome: Ongoing (interventions implemented as appropriate)  Patient remained free of injury for entire shift, no fall occurences.Patient remained afebrile throughout shift, vital signs stable.Patient's skin  warm, dry, and intact with no new tears or abrasions. Patient did not experience any seizures during shift. Patient non-compliant with medications; refused early morning PO meds.

## 2017-08-13 NOTE — ASSESSMENT & PLAN NOTE
- likely 2/2 to urinary retention. failed outpatient treatment with bactrim  - Ucx with pansensitive citrobacter and MSSA  - cont ceftriaxone IV, vanc stopped  - will transition to PO regimen tomorrow to complete 10 day course, likely augmentin  - barton replaced for retention, tamsulosin continued

## 2017-08-14 PROBLEM — G93.40 ENCEPHALOPATHY: Status: ACTIVE | Noted: 2017-01-01

## 2017-08-14 NOTE — PLAN OF CARE
Problem: Patient Care Overview  Goal: Plan of Care Review  Outcome: Ongoing (interventions implemented as appropriate)  Patient remains free from fall with injury

## 2017-08-14 NOTE — PLAN OF CARE
MICAH spoke with pt's daughter, Marta Sanches ([phone]), who confirmed that pt would not be returning to [facility].  MICAH explained that pt is unable to independently transfer which makes a D/C to an assisted Living facility an unsafe plan.  Marta expressed understanding and stated that pt would returning to [facility], where he would receive a higher level of care.  Marta also stated that she was unsure of the  company pt has used in the past.  Marta asked if [facility] would arrange transport to [facility].  MICAH contacted [facility] to confirm company, but pt's nurse there was not aware of former company and gave me the contact number for [facility] ([phone]).  SW placed call to [facility], but received vm and left a message asking for a return call.  Transport will be arranged once d/c plan is confirmed.        Neris Montez LMSW

## 2017-08-14 NOTE — PT/OT/SLP PROGRESS
"Occupational Therapy  Treatment    Virgil Melo Jr.   MRN: [id number]   Admitting Diagnosis: Complicated UTI (urinary tract infection)    OT Date of Treatment: 08/14/17   OT Start Time: 1400  OT Stop Time: 1423  OT Total Time (min): 23 min    Billable Minutes:  Self Care/Home Management 23    General Precautions: Standard, fall    Do you have any cultural, spiritual, Holiness conflicts, given your current situation?: None stated    Subjective:  Communicated with RN prior to session.    Pt agreeable to treatment    Pain/Comfort  Pain Rating 1: 0/10    Objective:  Patient found with: barton catheter     Functional Mobility:  Bed Mobility:  Scooting/Bridging: Stand by Assistance  Supine to Sit: Stand by Assistance    Transfers:   Sit <> Stand Assistance: Minimum Assistance   Sit <> Stand Assistive Device: Rolling Walker  1x from EOB; 2x from bedside chair    Functional Ambulation: ModA with RW 10 feet    Activities of Daily Living:  UE Dressing Level of Assistance: Stand by assistance   Increased time to noel saba shirt    LE Dressing Level of Assistance: Maximum assistance  Noel underwear, pants, and socks    AM-PAC 6 CLICK ADL   How much help from another person does this patient currently need?   1 = Unable, Total/Dependent Assistance  2 = A lot, Maximum/Moderate Assistance  3 = A little, Minimum/Contact Guard/Supervision  4 = None, Modified Branch/Independent    Putting on and taking off regular lower body clothing? : 2  Bathing (including washing, rinsing, drying)?: 2  Toileting, which includes using toilet, bedpan, or urinal? : 2  Putting on and taking off regular upper body clothing?: 3  Taking care of personal grooming such as brushing teeth?: 3  Eating meals?: 3  Total Score: 15     AM-PAC Raw Score CMS "G-Code Modifier Level of Impairment Assistance   6 % Total / Unable   7 - 8 CM 80 - 100% Maximal Assist   9-13 CL 60 - 80% Moderate Assist   14 - 19 CK 40 - 60% Moderate Assist   20 - 22 CJ 20 - 40% " Minimal Assist   23 CI 1-20% SBA / CGA   24 CH 0% Independent/ Mod I       Patient left seated on couch with all lines intact, call button in reach, RN notified and daughter present    ASSESSMENT:  Virgil Melo Jr. is a 88 y.o. male with a medical diagnosis of Complicated UTI (urinary tract infection) and tolerated session well with good participation and would continue to benefit from OT services to maximize functional (I) and safety.    Rehab identified problem list/impairments: Rehab identified problem list/impairments: weakness, impaired endurance, impaired self care skills, impaired functional mobilty, gait instability, impaired cognition, decreased lower extremity function, decreased safety awareness, decreased ROM, impaired skin    Rehab potential is good.    Activity tolerance: Good    Discharge recommendations: Discharge Facility/Level Of Care Needs: nursing facility, skilled     Barriers to discharge: Barriers to Discharge: None    Equipment recommendations: none     GOALS:    Occupational Therapy Goals     Not on file          Multidisciplinary Problems (Resolved)        Problem: Occupational Therapy Goal    Goal Priority Disciplines Outcome Interventions   Occupational Therapy Goal   (Resolved)     OT, PT/OT Outcome(s) achieved    Description:  Goals to be met by: 8/24/2017 (2 weeks)     Patient will increase functional independence with ADLs by performing:    UE Dressing with Minimal Assistance.  LE Dressing with Moderate Assistance.  Grooming while seated with Contact Guard Assistance.  Toileting from bedside commode with Minimal Assistance for hygiene and clothing management.   Supine to sit with Contact Guard Assistance.  Toilet transfer to bedside commode with Minimal Assistance.                      Plan:  Patient to be seen 4 x/week to address the above listed problems via self-care/home management, therapeutic activities, therapeutic exercises  Plan of Care expires: 09/09/17  Plan of Care  reviewed with: patient, daughter         Lisbeth HUTTON Nehemias, GUILLERMO  08/14/2017

## 2017-08-14 NOTE — PLAN OF CARE
MICAH spoke with Kristi at [facility] and was told that the [facility] RN should call report to [facility] at [facility].  Transport arranged through [facility] for  van to transport pt.  MICAH was told that van would be here to  pt within 2 hours.  Packet printed up for [facility] consisting of facesheet, D/C orders and H&P.    Pt is CEC'd, MICAH talked with CRICKET D[place] , to call IM 1 to confirm that the PEC/CEC order is/will be lifted.  Corrie confirmed that Dr. Adan will put in an order to lift CEC.      Neris Montez LMSW

## 2017-08-14 NOTE — PLAN OF CARE
[facility]    HOME HEALTH ORDERS  FACE TO FACE ENCOUNTER    Patient Name: Virgil Melo Jr.  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]  Yes    Urinary retention [R33.9]  Yes    Encounter for aftercare for healing closed traumatic fracture of hip [S72.009D]  Not Applicable    Paroxysmal atrial fibrillation [I48.0]  Yes    Acquired hypothyroidism [E03.9]  Yes    Vascular dementia without behavioral disturbance [F01.50]  Yes    Essential hypertension [I10]  Yes    Partial symptomatic epilepsy with complex partial seizures, not intractable, without status epilepticus [G40.209]  Yes      Resolved Hospital Problems    Diagnosis Date Resolved POA    ESTHER (acute kidney injury) [N17.9] 08/12/2017 Yes       Future Appointments  Date Time Provider Department Center   8/16/2017 3:30 PM Diana Martines PA-C [facility] ORTHO [address]   9/27/2017 3:00 PM [facility] FRACTURE CARE CLINIC [facility] FRA CAR [address]     Follow-up Information     Luis A Milton MD In 2 weeks.    Specialties:  Family Medicine, Sports Medicine  Contact information:  [address]  [facility] [id number] [phone]             [facility] UROLOGY In 1 week.    Specialty:  Urology  Contact information:  [id number]  [facility] [id number] [phone]                   I have seen and examined this patient face to face today. My clinical findings that support the need for the home health skilled services and home bound status are the following:  Mental confusion making it unsafe for patient to leave home alone due to  Dementia.    Allergies:  Review of patient's allergies  indicates:   Allergen Reactions    Penicillins Hives     Tolerated ceftriaxone       Diet: renal diet    Activities: activity as tolerated    Nursing:   SN to complete comprehensive assessment including routine vital signs. Instruct on disease process and s/s of complications to report to MD. Review/verify medication list sent home with the patient at time of discharge  and instruct patient/caregiver as needed. Frequency may be adjusted depending on start of care date.    Notify MD if SBP > 160 or < 90; DBP > 90 or < 50; HR > 120 or < 50; Temp > 101; Other:        CONSULTS:    Physical Therapy to evaluate and treat. Evaluate for home safety and equipment needs; Establish/upgrade home exercise program. Perform / instruct on therapeutic exercises, gait training, transfer training, and Range of Motion.  Occupational Therapy to evaluate and treat. Evaluate home environment for safety and equipment needs. Perform/Instruct on transfers, ADL training, ROM, and therapeutic exercises.    MISCELLANEOUS CARE:  N/A    WOUND CARE ORDERS  n/a      Medications: Review discharge medications with patient and family and provide education.      Current Discharge Medication List      START taking these medications    Details   cephALEXin (KEFLEX) 500 MG capsule Take 1 capsule (500 mg total) by mouth every 12 (twelve) hours. Last day 8/18/17  Qty: 8 capsule, Refills: 0      tamsulosin (FLOMAX) 0.4 mg Cp24 Take 1 capsule (0.4 mg total) by mouth once daily.  Qty: 90 capsule, Refills: 3         CONTINUE these medications which have NOT CHANGED    Details   acetaminophen (TYLENOL) 650 MG TbSR Take 650 mg by mouth every 6 (six) hours as needed (mild to moderate pain).      apixaban 5 mg Tab Take 2 tablets (10mg) by mouth twice daily for 5 more days with end date of 7/12/2017 then decrease dose to 1 tablet (5mg) my mouth twice daily on 7/13/2017 and continue indefinitely for treatment of PE and also for stroke prevention for atrial  fibrillation.      atorvastatin (LIPITOR) 40 MG tablet TAKE 1 TABLET BY MOUTH EVERY DAY  Qty: 90 tablet, Refills: 11    Comments: **Patient requests 90 days supply**      calcium carbonate (OS-ROSA) 500 mg calcium (1,250 mg) tablet Take 2 tablets (1,000 mg total) by mouth once daily.  Refills: 0      carvedilol (COREG) 25 MG tablet Take 1 tablet (25 mg total) by mouth 2 (two) times daily.      ergocalciferol (ERGOCALCIFEROL) 50,000 unit Cap Take 1 capsule (50,000 Units total) by mouth Every Friday.      gabapentin (NEURONTIN) 800 MG tablet Take 1 tablet (800 mg total) by mouth 3 (three) times daily.  Qty: 270 tablet, Refills: 1    Associated Diagnoses: Seizure disorder      levetiracetam (KEPPRA) 750 MG Tab Take 1 tablet (750 mg total) by mouth 2 (two) times daily.  Qty: 180 tablet, Refills: 1    Associated Diagnoses: Seizure disorder      levothyroxine (SYNTHROID) 150 MCG tablet Take 150 mcg by mouth once daily.      memantine (NAMENDA) 10 MG Tab 1 BID  Qty: 180 tablet, Refills: 1    Associated Diagnoses: Memory loss      multivitamin (ONE DAILY MULTIVITAMIN) per tablet Take 1 tablet by mouth once daily.      phenobarbital (LUMINAL) 32.4 MG tablet Take 1 tablet (32.4 mg total) by mouth 3 (three) times daily.  Qty: 270 tablet, Refills: 1    Associated Diagnoses: Seizure disorder      senna-docusate 8.6-50 mg (PERICOLACE) 8.6-50 mg per tablet Take 1 tablet by mouth 2 (two) times daily as needed for Constipation.      zinc sulfate 220 mg Tab Take 1 tablet by mouth once daily.         STOP taking these medications       aspirin (ECOTRIN) 325 MG EC tablet Comments:   Reason for Stopping:         hydrocodone-acetaminophen 5-325mg (NORCO) 5-325 mg per tablet Comments:   Reason for Stopping:         lactulose (CHRONULAC) 20 gram/30 mL Soln Comments:   Reason for Stopping:         sulfamethoxazole-trimethoprim 800-160mg (BACTRIM DS) 800-160 mg Tab Comments:   Reason for Stopping:         acetaminophen (TYLENOL) 325 MG tablet  Comments:   Reason for Stopping:               I certify that this patient is confined to his home and needs physical therapy and occupational therapy.

## 2017-08-14 NOTE — PLAN OF CARE
MICAH received a return call from Kristi at [facility] ([phone]) and she confirmed that pt is being moved to Memory Care at the facility and that pt has never had HH services there.  Kristi stated that the facility has worked well with [facility] in the past and feels that pt would benefit from their services.  Pt's daughter, Marta, stated that she would like to refer to the company that [facility] used in the facility.  Referral will be sent to [facility] once the HH orders are put in.      Neris Montez LMSW

## 2017-08-14 NOTE — NURSING
Md on call notified of patient's constant attempt to get out of bed.  She will look at chart and assess for additional meds.

## 2017-08-14 NOTE — PLAN OF CARE
Future Appointments  Date Time Provider Department Center   8/16/2017 3:30 PM Diana Martines PA-C [facility] ORTHO [address]   8/21/2017 10:20 AM Luis A Olson MD [facility]   8/21/2017 1:40 PM Aubrie Fuchs PA-C [facility] UROLOGY [address]   9/27/2017 3:00 PM [facility] FRACTURE CARE CLINIC [facility] FRA CAR [address]        08/14/17 1654   Final Note   Assessment Type Final Discharge Note   Discharge Disposition Home-Health   Discharge planning education complete? Yes   What phone number can be called within the next 1-3 days to see how you are doing after discharge? [phone]   Hospital Follow Up  Appt(s) scheduled? Yes   Discharge plans and expectations educations in teach back method with documentation complete? Yes   Offered [facility] -- Bedside Delivery? n/a   Discharge/Hospital Encounter Summary to ([facility]) PCP n/a   Referral to Outpatient Case Management complete? n/a   Referral to / orders for Home Health Complete? Yes   30 day supply of medicines given at discharge, if documented non-compliance / non-adherence? n/a   Any social issues identified prior to discharge? No   Did you assess the readiness or willingness of the family or caregiver to support self management of care? Yes   Right Care Referral Info   Post Acute Recommendation Home-care

## 2017-08-14 NOTE — PT/OT/SLP PROGRESS
Physical Therapy  Treatment    Virgil Melo Jr.   MRN: [id number]   Admitting Diagnosis: Complicated UTI (urinary tract infection)    PT Received On: 08/14/17  PT Start Time: 1400     PT Stop Time: 1423    PT Total Time (min): 23 min       Billable Minutes:  Gait [id number] and Therapeutic Activity 8    Treatment Type: Treatment  PT/PTA: PT             General Precautions: Standard, fall  Orthopedic Precautions: N/A   Braces: N/A    Do you have any cultural, spiritual, Adventism conflicts, given your current situation?: none stated    Subjective:  Communicated with RN prior to session.  Patient agreeable to PT session. Daughter present.     Pain/Comfort  Pain Rating 1: 0/10  Pain Rating Post-Intervention 1: 0/10    Objective:   Patient found with: telemetry, peripheral IV, barton catheter    Functional Mobility:  Bed Mobility:   Rolling/Turning to Left: Supervision  Rolling/Turning Right: Supervision  Scooting/Bridging: Supervision  Supine to Sit: Supervision  Sit to Supine:  (patient left up on sofa with daughter present)    Transfers:  Sit <> Stand Assistance: Minimum Assistance (x2)  Sit <> Stand Assistive Device: Rolling Walker  Bed <> Chair Technique: Stand Pivot  Bed <> Chair Assistance: Minimum Assistance  Bed <> Chair Assistive Device: Rolling Walker    Gait:   Gait Distance: 10ft  Assistance 1: Moderate assistance  Gait Assistive Device: Rolling walker  Gait Pattern: swing-through gait  Gait Deviation(s): decreased luis, increased time in double stance, decreased velocity of limb motion, decreased step length, decreased stride length, decreased swing-to-stance ratio, backward lean (significant backward lean with trunk)    Balance:   Static Sit: NORMAL: No deviations seen in posture held statically  Dynamic Sit: FAIR+: Maintains balance through MINIMAL excursions of active trunk motion  Static Stand: POOR+: Needs MINIMAL assist to maintain  Dynamic stand: 0: N/A Mod Assist for gait    Therapeutic Activities  and Exercises:  Patient and daughter educated role of PT/POC.    Supervision with bed mobility.  Sit<>stand x2  1st attempt from EOB: Min Assist with rolling walker  2nd attempt from bedside chair: Min Assist with rolling walker    Patient [place] limiting ability to follow commands safely     Gait 10ft with rolling walker Mod Assist for safety.  Patient with significant backward lean during stepping limiting safety  Mod Assist support at trunk to keep weight forward during ambulation with rolling walker.    AM-PAC 6 CLICK MOBILITY  How much help from another person does this patient currently need?   1 = Unable, Total/Dependent Assistance  2 = A lot, Maximum/Moderate Assistance  3 = A little, Minimum/Contact Guard/Supervision  4 = None, Modified [place]/Independent    Turning over in bed (including adjusting bedclothes, sheets and blankets)?: 3  Sitting down on and standing up from a chair with arms (e.g., wheelchair, bedside commode, etc.): 2  Moving from lying on back to sitting on the side of the bed?: 3  Moving to and from a bed to a chair (including a wheelchair)?: 2  Need to walk in hospital room?: 2  Climbing 3-5 steps with a railing?: 1  Total Score: 13    AM-PAC Raw Score CMS G-Code Modifier Level of Impairment Assistance   6 % Total / Unable   7 - 9 CM 80 - 100% Maximal Assist   10 - 14 CL 60 - 80% Moderate Assist   15 - 19 CK 40 - 60% Moderate Assist   20 - 22 CJ 20 - 40% Minimal Assist   23 CI 1-20% SBA / CGA   24 CH 0% Independent/ Mod I     Patient left up in chair with all lines intact, call button in reach, RN notified and daughter and sitter present.    Assessment:  Virgil Melo Jr. is a 88 y.o. male with a medical diagnosis of Complicated UTI (urinary tract infection) and presents with generalized weakness, impaired cognition, decreased safety awareness, and impaired coordination limiting functional mobility. Sit<>stand transfer with Min Assist. Stand pivot transfer to bedside chair  with Min Assist using rolling walker. Gait 10ft Mod Assist with rolling walker to bedside chair. Patient with decreased safety with tendency to step with posterior trunk lean. To benefit from continued skilled intervention to address deficits prior to transition to SNF to improve safety and overall functional mobility.    Rehab identified problem list/impairments: Rehab identified problem list/impairments: weakness, impaired endurance, impaired self care skills, impaired functional mobilty, gait instability, impaired balance, impaired cognition, decreased lower extremity function, decreased safety awareness, impaired coordination    Rehab potential is fair.    Activity tolerance: Fair    Discharge recommendations: Discharge Facility/Level Of Care Needs: nursing facility, skilled     Barriers to discharge: Barriers to Discharge: None    Equipment recommendations: Equipment Needed After Discharge: none     GOALS:    Physical Therapy Goals        Problem: Physical Therapy Goal    Goal Priority Disciplines Outcome Goal Variances Interventions   Physical Therapy Goal     PT/OT, PT Ongoing (interventions implemented as appropriate)     Description:  Goals to be met by: 17     Patient will increase functional independence with mobility by performin. Supine to sit with MInimal Assistance - Met   Updated: Supine to sit with Mod I  2. Sit to supine with MInimal Assistance  3. Sit to stand transfer with Minimal Assistance - Met   Updated: Sit to stand transfer with CGA  4. Gait  x 10 feet with Maximum Assistance using Rolling Walker. - Met   Updated: Gait x 10 feet with Min Assistance using rolling walker  5. Stand for 2 minutes with Moderate Assistance using Rolling Walker for UE support, while performing reaching activity.   6. Lower extremity exercise program x10 reps with assistance as needed                       PLAN:    Patient to be seen 3 x/week  to address the above listed problems via gait training,  therapeutic activities, therapeutic exercises, neuromuscular re-education  Plan of Care expires: 09/14/17  Plan of Care reviewed with: patient, daughter         Branden YANG Braden CAREY, PT  08/14/2017

## 2017-08-14 NOTE — PLAN OF CARE
SW received notice from medical team that HH orders were in.  Referral already in progress with [facility].      Neris Montez LMSW

## 2017-08-14 NOTE — PLAN OF CARE
Problem: Physical Therapy Goal  Goal: Physical Therapy Goal  Goals to be met by: 17     Patient will increase functional independence with mobility by performin. Supine to sit with MInimal Assistance - Met   Updated: Supine to sit with Mod I  2. Sit to supine with MInimal Assistance  3. Sit to stand transfer with Minimal Assistance - Met   Updated: Sit to stand transfer with CGA  4. Gait  x 10 feet with Maximum Assistance using Rolling Walker. - Met   Updated: Gait x 10 feet with Min Assistance using rolling walker  5. Stand for 2 minutes with Moderate Assistance using Rolling Walker for UE support, while performing reaching activity.   6. Lower extremity exercise program x10 reps with assistance as needed     Outcome: Ongoing (interventions implemented as appropriate)  Goals reassessed and updated to improve functional mobility.    Branden Feliciano III, ROXANNE  2017

## 2017-08-14 NOTE — NURSING
[facility] dc'd to [facility], report given to Mckenzie. Vs wnl, no s/sx of distress noted. Daughter at bs. Pt waiting on SPD. PIV dc'd. Barton catheter intact, pt to be dc'd with barton. Will continue to monitor.

## 2017-08-15 NOTE — PROGRESS NOTES
[facility] Hospital Medicine  Progress Note    Patient Name: Virgil Melo Jr.  MRN: [id number]  Patient Class: IP- Inpatient   Admission Date: 8/9/2017  Length of Stay: 5 days  Attending Physician: Tyler Adan MD  Primary Care Provider: Luis A Milton MD    [facility] Medicine Team: [facility] HOSP MED 1 Scott Ramos MD    Subjective:     Principal Problem:Complicated UTI (urinary tract infection)    HPI:  Mr Melo is an 87 y/o M with a history of epilepsy, vascular dementia (on memantine), AF and PE (on eliquis), recent hip fracture with surgical repair on 7/3, and recent UTI for which he was taking bactrim presents to the ED with altered mental status. History provided by daughter. She reports that his mental status has declined in recent years and for the last two weeks he has exhibited aggression, combativeness, and suicidal ideations. He was taken to [facility] last week, but was not admitted as he was not exhibiting SI at the time. He was started on Bactrim for a UTI two days ago at the rehab facility. Daughter reports that he was at his recent baseline 2 days ago, but she received a call from his rehab facility last night stating that he had become more altered with further SI and combativeness.     In the ED, pt was disoriented, aggressive, and abusive toward staff, so PEC placed and psych consulted. UA was concerning for continued UTI, so pt started on ceftriaxone. Pt also had suprapubic tenderness and palpable bladder on physical exam- Archer was placed and drained 1675cc of slightly blood-tinged urine. Currently patient is calm and cooperative, but daughter states that he is more confused now than at baseline.     Hospital Course:  Presented with OPC in place after being combative with SI reported at nursing home. Pt continued to be aggressive in ED and PEC was palced. UA was positive and pt was started on ceftriaxone. He had lower abdominal tenderness and  palpable bladder- Archer was placed and drained 1675cc. Also w mild ESTHER, Cr 1.3 from baseline of 0.9-1.0, likely obstructive in etiology and improved with Archer placement and IV fluids. Psych saw patient but was unable to evaluate for SI in the setting of acute delirium. Ucx with MSSA and pansensitive citrobacter. Has been retaining urine- Archer placed and started tamsulosin.    08/14: Had an episode of agitation yesterday evening. Given zyprexa.   Doing better compared to yesterday. Planned for discharge.    Interval History: Had an episode of agitation yesterday; given a dose of zyprexa.  Vitals stable today.    Review of Systems   Constitutional: Negative for appetite change, chills, fever and unexpected weight change.   Gastrointestinal: Positive for abdominal pain. Negative for constipation, diarrhea, nausea and vomiting.   Endocrine: Negative for cold intolerance and heat intolerance.   Genitourinary: Positive for difficulty urinating. Negative for urgency.   Neurological: Positive for light-headedness. Negative for dizziness, syncope, weakness and headaches.   Psychiatric/Behavioral: Positive for agitation. Negative for behavioral problems and confusion.     Objective:     Vital Signs (Most Recent):  Temp: 97.6 °F (36.4 °C) (08/14/17 1207)  Pulse: 64 (08/14/17 1207)  Resp: 18 (08/14/17 1207)  BP: 107/60 (08/14/17 1207)  SpO2: 97 % (08/14/17 1207) Vital Signs (24h Range):  Temp:  [97.3 °F (36.3 °C)-97.8 °F (36.6 °C)] 97.6 °F (36.4 °C)  Pulse:  [64-70] 64  Resp:  [17-18] 18  SpO2:  [95 %-98 %] 97 %  BP: (107-175)/(60-84) 107/60     Weight: 86.8 kg (191 lb 4.8 oz)  Body mass index is 25.94 kg/m².    Intake/Output Summary (Last 24 hours) at 08/14/17 1916  Last data filed at 08/14/17 1230   Gross per 24 hour   Intake              220 ml   Output              325 ml   Net             -105 ml      Physical Exam   Cardiovascular: Normal rate, regular rhythm, normal heart sounds and intact distal pulses.     Pulmonary/Chest: Effort normal and breath sounds normal.   Abdominal: Soft. Bowel sounds are normal. He exhibits distension. There is no tenderness.   Neurological: He is alert.   Nursing note and vitals reviewed.      Significant Labs:   A1C: No results for input(s): HGBA1C in the last 4320 hours.  ABGs: No results for input(s): PH, PCO2, HCO3, POCSATURATED, BE, TOTALHB, COHB, METHB, O2HB, POCFIO2 in the last 48 hours.  Bilirubin:   Recent Labs  Lab 08/09/17  0718   BILITOT 0.3     Blood Culture: No results for input(s): LABBLOO in the last 48 hours.  BMP:   Recent Labs  Lab 08/14/17  0353   GLU 81      K 4.0      CO2 20*   BUN 16   CREATININE 1.0   CALCIUM 8.5*   MG 1.7     CBC:   Recent Labs  Lab 08/13/17  0638 08/14/17  0353   WBC 7.66 7.02   HGB 11.0* 10.5*   HCT 34.4* 32.8*    254     CMP:   Recent Labs  Lab 08/13/17  0638 08/14/17  0353    140   K 4.1 4.0    108   CO2 19* 20*   GLU 90 81   BUN 16 16   CREATININE 1.0 1.0   CALCIUM 8.8 8.5*   ALBUMIN 2.5* 2.6*   ANIONGAP 12 12   EGFRNONAA >60.0 >60.0     Cardiac Markers: No results for input(s): CKMB, MYOGLOBIN, BNP, TROPISTAT in the last 48 hours.  Coagulation: No results for input(s): INR, APTT in the last 48 hours.    Invalid input(s): PT  Lactic Acid: No results for input(s): LACTATE in the last 48 hours.  Lipase: No results for input(s): LIPASE in the last 48 hours.  Lipid Panel: No results for input(s): CHOL, HDL, LDLCALC, TRIG, CHOLHDL in the last 48 hours.  Magnesium:   Recent Labs  Lab 08/13/17  0638 08/14/17  0353   MG 2.0 1.7     Pathology Results  (Last 10 years)               06/28/12 0000  Tissue Specimen to Pathology Final result        POCT Glucose: No results for input(s): POCTGLUCOSE in the last 48 hours.  Prealbumin: No results for input(s): PREALBUMIN in the last 48 hours.  Respiratory Culture: No results for input(s): GSRESP, RESPIRATORYC in the last 48 hours.  Troponin: No results for input(s): TROPONINI in  the last 48 hours.  TSH:   Recent Labs  Lab 08/09/17  0342   TSH 1.419     Urine Culture: No results for input(s): LABURIN in the last 48 hours.  Urine Studies: No results for input(s): COLORU, APPEARANCEUA, PHUR, SPECGRAV, PROTEINUA, GLUCUA, KETONESU, BILIRUBINUA, OCCULTUA, NITRITE, UROBILINOGEN, LEUKOCYTESUR, RBCUA, WBCUA, BACTERIA, SQUAMEPITHEL, HYALINECASTS in the last 48 hours.    Invalid input(s): DOMITILASUR  All pertinent labs within the past 24 hours have been reviewed.    Significant Imaging: I have reviewed all pertinent imaging results/findings within the past 24 hours.    Assessment/Plan:      * Complicated UTI (urinary tract infection)    - likely 2/2 to urinary retention. failed outpatient treatment with bactrim  - Ucx with pansensitive citrobacter and MSSA  - Planned for discharge today  - Completed 5 days of rocephin. Transitioned to oral keflex 250mg BD for 5 days.               Urinary retention    - patient with palpable mass and tenderness in suprapubic region in ED  - Archer re-placed and started on tamsulosin  - Archer left in place at discharge.  - Scheduled for Urology follow-up in 1 week.        Essential hypertension    - continuing home coreg 25 BID        Partial symptomatic epilepsy with complex partial seizures, not intractable, without status epilepticus    - continuing home keppra, phenobarbital, and gabapentin        Vascular dementia without behavioral disturbance    - continuing home namenda        Anemia    - hgb stable          Septic encephalopathy    - as above        Paroxysmal atrial fibrillation    - hgb stable so restarted eliquis        Acquired hypothyroidism    - TSH wnl  - continuing home synthroid          VTE Risk Mitigation         Ordered     apixaban tablet 5 mg  2 times daily     Route:  Oral        08/12/17 0845              Scott Ramos MD  Department of Hospital Medicine   [facility]

## 2017-08-15 NOTE — ASSESSMENT & PLAN NOTE
- likely 2/2 to urinary retention. failed outpatient treatment with bactrim  - Ucx with pansensitive citrobacter and MSSA  - Planned for discharge today  - Completed 5 days of rocephin. Transitioned to oral keflex 250mg BD for 5 days.

## 2017-08-15 NOTE — DISCHARGE SUMMARY
[facility] Hospital Medicine  Discharge Summary      Patient Name: Virgil Melo Jr.  MRN: [id number]  Admission Date: 8/9/2017  Hospital Length of Stay: 5 days  Discharge Date and Time:  08/14/2017 7:23 PM  Attending Physician: Tyler Adan MD   Discharging Provider: Scott Ramos MD  Primary Care Provider: Luis A Milton MD  [facility] Medicine Team: [facility] HOSP MED 1 Scott Ramos MD    HPI:   Mr Melo is an 89 y/o M with a history of epilepsy, vascular dementia (on memantine), AF and PE (on eliquis), recent hip fracture with surgical repair on 7/3, and recent UTI for which he was taking bactrim presents to the ED with altered mental status. History provided by daughter. She reports that his mental status has declined in recent years and for the last two weeks he has exhibited aggression, combativeness, and suicidal ideations. He was taken to [facility] last week, but was not admitted as he was not exhibiting SI at the time. He was started on Bactrim for a UTI two days ago at the rehab facility. Daughter reports that he was at his recent baseline 2 days ago, but she received a call from his rehab facility last night stating that he had become more altered with further SI and combativeness.     In the ED, pt was disoriented, aggressive, and abusive toward staff, so PEC placed and psych consulted. UA was concerning for continued UTI, so pt started on ceftriaxone. Pt also had suprapubic tenderness and palpable bladder on physical exam- Archer was placed and drained 1675cc of slightly blood-tinged urine. Currently patient is calm and cooperative, but daughter states that he is more confused now than at baseline.     * No surgery found *      Indwelling Lines/Drains at time of discharge:   Lines/Drains/Airways     Drain                 Urethral Catheter 08/11/17 1123 Non-latex 15 Fr. 3 days              Hospital Course:   Presented with OPC in place after  being combative with SI reported at nursing home. Pt continued to be aggressive in ED and PEC was palced. UA was positive and pt was started on ceftriaxone. He had lower abdominal tenderness and palpable bladder- Archer was placed and drained 1675cc. Also w mild ESTHER, Cr 1.3 from baseline of 0.9-1.0, likely obstructive in etiology and improved with Archer placement and IV fluids. Psych saw patient but was unable to evaluate for SI in the setting of acute delirium. Ucx with MSSA and pansensitive citrobacter. Has been retaining urine- Archer placed and started tamsulosin.    08/14: Had an episode of agitation yesterday evening. Given zyprexa.   Doing better compared to yesterday. Planned for discharge.     Consults:   Consults         Status Ordering Provider     Inpatient consult to Psychiatry  Once     Provider:  (Not yet assigned)    Completed VITALIY CONCEPCION          Significant Diagnostic Studies: Labs:   BMP:   Recent Labs  Lab 08/13/17  0638 08/14/17  0353   GLU 90 81    140   K 4.1 4.0    108   CO2 19* 20*   BUN 16 16   CREATININE 1.0 1.0   CALCIUM 8.8 8.5*   MG 2.0 1.7   , CMP   Recent Labs  Lab 08/13/17  0638 08/14/17  0353    140   K 4.1 4.0    108   CO2 19* 20*   GLU 90 81   BUN 16 16   CREATININE 1.0 1.0   CALCIUM 8.8 8.5*   ALBUMIN 2.5* 2.6*   ANIONGAP 12 12   ESTGFRAFRICA >60.0 >60.0   EGFRNONAA >60.0 >60.0   , CBC   Recent Labs  Lab 08/13/17  0638 08/14/17  0353   WBC 7.66 7.02   HGB 11.0* 10.5*   HCT 34.4* 32.8*    254   , INR   Lab Results   Component Value Date    INR 1.1 08/09/2017    INR 1.0 07/01/2017    INR 1.0 09/14/2011   , Lipid Panel   Lab Results   Component Value Date    CHOL 277 (H) 12/23/2014    HDL 56 12/23/2014    LDLCALC 198.2 (H) 12/23/2014    TRIG 114 12/23/2014    CHOLHDL 20.2 12/23/2014   , Troponin No results for input(s): TROPONINI in the last 168 hours. and A1C: No results for input(s): HGBA1C in the last 4320 hours.  Microbiology:   Urine  Culture    Lab Results   Component Value Date    LABURIN CITROBACTER KOSERI  > 100,000 cfu/ml   08/09/2017    LABURIN STAPHYLOCOCCUS AUREUS  > 100,000 cfu/ml   08/09/2017       Pending Diagnostic Studies:     None        Final Active Diagnoses:    Diagnosis Date Noted POA    PRINCIPAL PROBLEM:  Complicated UTI (urinary tract infection) [N39.0] 08/09/2017 Yes    Urinary retention [R33.9] 08/09/2017 Yes    Essential hypertension [I10]  Yes    Partial symptomatic epilepsy with complex partial seizures, not intractable, without status epilepticus [G40.209]  Yes    Vascular dementia without behavioral disturbance [F01.50] 10/09/2012 Yes    Anemia [D64.9] 08/12/2017 Yes    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 failed outpatient treatment with bactrim  - Ucx with pansensitive citrobacter and MSSA  - Planned for discharge today  - Completed 5 days of rocephin. Transitioned to oral keflex 250mg BD for 5 days.               Urinary retention    - patient with palpable mass and tenderness in suprapubic region in ED  - Archer re-placed and started on tamsulosin  - Archer left in place at discharge.  - Scheduled for Urology follow-up in 1 week.        Partial symptomatic epilepsy with complex partial seizures, not intractable, without status epilepticus    - continuing home keppra, phenobarbital, and gabapentin        Vascular dementia without behavioral disturbance    - continuing home namenda        Septic encephalopathy    - as above        Paroxysmal atrial  fibrillation    - hgb stable so restarted eliquis        Acquired hypothyroidism    - TSH wnl  - continuing home synthroid            Discharged Condition: fair    Disposition: Home or Self Care    Follow Up:  Follow-up Information     Luis A Milton MD On 8/21/2017.    Specialties:  Family Medicine, Sports Medicine  Why:  At 10:20, Hospital Follow-up with PCP  [facility] information:  [address] [facility] [address]  [phone]             Diana Martines PA-C On 8/16/2017.    Specialty:  Orthopedic Surgery  Why:  At 3:30 PM  Contact information:  [address] [facility] [address]  [phone]             Aubrie Fuchs PA-C On 8/21/2017.    Specialty:  Urology  Why:  At 1:40 PM in Urology clinic for voiding trial/catheter removal  Contact information:  [address] [facility] [address]  [phone]                 Patient Instructions:     Ambulatory referral to Outpatient Case Management   Referral Priority: Routine Referral Type: Consultation   Referral Reason: Specialty Services Required    Number of Visits Requested: 1      Referral to Home health   Referral Priority: Routine Referral Type: Home Health   Referral Reason: Specialty Services Required    Requested Specialty: Home Health Services    Number of Visits Requested: 1      Referral to Outpatient Occupational therapy   Referral Priority: Routine Referral Type: Occupational Therapy   Referral Reason: Specialty Services Required    Requested Specialty: Occupational Therapy    Number of Visits Requested: 1      Referral to OutPatient  Physical therapy   Referral Priority: Routine Referral Type: Physical Medicine   Referral Reason: Specialty Services Required    Requested Specialty: Physical Therapy    Number of Visits Requested: 1      Referral to Outpatient Occupational therapy   Referral Priority: Routine Referral Type: Occupational Therapy   Referral Reason: Specialty Services Required    Requested Specialty: Occupational  Therapy    Number of Visits Requested: 1      Referral to OutPatient  Physical therapy   Referral Priority: Routine Referral Type: Physical Medicine   Referral Reason: Specialty Services Required    Requested Specialty: Physical Therapy    Number of Visits Requested: 1      Referral to Home health   Referral Priority: Routine Referral Type: Home Health Care   Referral Reason: Specialty Services Required    Requested Specialty: Home Health Services    Number of Visits Requested: 1      Diet renal     Diet renal     Activity as tolerated     Call MD for:  temperature >100.4     Call MD for:  persistent nausea and vomiting or diarrhea     Call MD for:  severe uncontrolled pain     Call MD for:  difficulty breathing or increased cough     Call MD for:  severe persistent headache     Call MD for:  worsening rash     Call MD for:  persistent dizziness, light-headedness, or visual disturbances     Call MD for:  increased confusion or weakness     Activity as tolerated     Call MD for:  temperature >100.4     Call MD for:  persistent nausea and vomiting or diarrhea     Call MD for:  severe uncontrolled pain     Call MD for:  redness, tenderness, or signs of infection (pain, swelling, redness, odor or green/yellow discharge around incision site)     Call MD for:  difficulty breathing or increased cough     Call MD for:  severe persistent headache     Call MD for:  worsening rash     Call MD for:  persistent dizziness, light-headedness, or visual disturbances     Call MD for:  increased confusion or weakness       Medications:  Reconciled Home Medications:   Current Discharge Medication List      START taking these medications    Details   cephALEXin (KEFLEX) 500 MG capsule Take 1 capsule (500 mg total) by mouth every 12 (twelve) hours. Last day 8/18/17  Qty: 8 capsule, Refills: 0      tamsulosin (FLOMAX) 0.4 mg Cp24 Take 1 capsule (0.4 mg total) by mouth once daily.  Qty: 90 capsule, Refills: 3         CONTINUE these  medications which have NOT CHANGED    Details   acetaminophen (TYLENOL) 650 MG TbSR Take 650 mg by mouth every 6 (six) hours as needed (mild to moderate pain).      apixaban 5 mg Tab Take 2 tablets (10mg) by mouth twice daily for 5 more days with end date of 7/12/2017 then decrease dose to 1 tablet (5mg) my mouth twice daily on 7/13/2017 and continue indefinitely for treatment of PE and also for stroke prevention for atrial fibrillation.      atorvastatin (LIPITOR) 40 MG tablet TAKE 1 TABLET BY MOUTH EVERY DAY  Qty: 90 tablet, Refills: 11    Comments: **Patient requests 90 days supply**      calcium carbonate (OS-ROSA) 500 mg calcium (1,250 mg) tablet Take 2 tablets (1,000 mg total) by mouth once daily.  Refills: 0      carvedilol (COREG) 25 MG tablet Take 1 tablet (25 mg total) by mouth 2 (two) times daily.      ergocalciferol (ERGOCALCIFEROL) 50,000 unit Cap Take 1 capsule (50,000 Units total) by mouth Every Friday.      gabapentin (NEURONTIN) 800 MG tablet Take 1 tablet (800 mg total) by mouth 3 (three) times daily.  Qty: 270 tablet, Refills: 1    Associated Diagnoses: Seizure disorder      levetiracetam (KEPPRA) 750 MG Tab Take 1 tablet (750 mg total) by mouth 2 (two) times daily.  Qty: 180 tablet, Refills: 1    Associated Diagnoses: Seizure disorder      levothyroxine (SYNTHROID) 150 MCG tablet Take 150 mcg by mouth once daily.      memantine (NAMENDA) 10 MG Tab 1 BID  Qty: 180 tablet, Refills: 1    Associated Diagnoses: Memory loss      multivitamin (ONE DAILY MULTIVITAMIN) per tablet Take 1 tablet by mouth once daily.      phenobarbital (LUMINAL) 32.4 MG tablet Take 1 tablet (32.4 mg total) by mouth 3 (three) times daily.  Qty: 270 tablet, Refills: 1    Associated Diagnoses: Seizure disorder      senna-docusate 8.6-50 mg (PERICOLACE) 8.6-50 mg per tablet Take 1 tablet by mouth 2 (two) times daily as needed for Constipation.      zinc sulfate 220 mg Tab Take 1 tablet by mouth once daily.         STOP taking  these medications       aspirin (ECOTRIN) 325 MG EC tablet Comments:   Reason for Stopping:         hydrocodone-acetaminophen 5-325mg (NORCO) 5-325 mg per tablet Comments:   Reason for Stopping:         lactulose (CHRONULAC) 20 gram/30 mL Soln Comments:   Reason for Stopping:         sulfamethoxazole-trimethoprim 800-160mg (BACTRIM DS) 800-160 mg Tab Comments:   Reason for Stopping:         acetaminophen (TYLENOL) 325 MG tablet Comments:   Reason for Stopping:             Time spent on the discharge of patient: 20 minutes    HOS POC IP DISCHARGE SUMMARY    Scott Ramos MD  Department of Hospital Medicine  [facility]

## 2017-08-15 NOTE — SUBJECTIVE & OBJECTIVE
Interval History: Had an episode of agitation yesterday; given a dose of zyprexa.  Vitals stable today.    Review of Systems   Constitutional: Negative for appetite change, chills, fever and unexpected weight change.   Gastrointestinal: Positive for abdominal pain. Negative for constipation, diarrhea, nausea and vomiting.   Endocrine: Negative for cold intolerance and heat intolerance.   Genitourinary: Positive for difficulty urinating. Negative for urgency.   Neurological: Positive for light-headedness. Negative for dizziness, syncope, weakness and headaches.   Psychiatric/Behavioral: Positive for agitation. Negative for behavioral problems and confusion.     Objective:     Vital Signs (Most Recent):  Temp: 97.6 °F (36.4 °C) (08/14/17 1207)  Pulse: 64 (08/14/17 1207)  Resp: 18 (08/14/17 1207)  BP: 107/60 (08/14/17 1207)  SpO2: 97 % (08/14/17 1207) Vital Signs (24h Range):  Temp:  [97.3 °F (36.3 °C)-97.8 °F (36.6 °C)] 97.6 °F (36.4 °C)  Pulse:  [64-70] 64  Resp:  [17-18] 18  SpO2:  [95 %-98 %] 97 %  BP: (107-175)/(60-84) 107/60     Weight: 86.8 kg (191 lb 4.8 oz)  Body mass index is 25.94 kg/m².    Intake/Output Summary (Last 24 hours) at 08/14/17 1916  Last data filed at 08/14/17 1230   Gross per 24 hour   Intake              220 ml   Output              325 ml   Net             -105 ml      Physical Exam   Cardiovascular: Normal rate, regular rhythm, normal heart sounds and intact distal pulses.    Pulmonary/Chest: Effort normal and breath sounds normal.   Abdominal: Soft. Bowel sounds are normal. He exhibits distension. There is no tenderness.   Neurological: He is alert.   Nursing note and vitals reviewed.      Significant Labs:   A1C: No results for input(s): HGBA1C in the last 4320 hours.  ABGs: No results for input(s): PH, PCO2, HCO3, POCSATURATED, BE, TOTALHB, COHB, METHB, O2HB, POCFIO2 in the last 48 hours.  Bilirubin:   Recent Labs  Lab 08/09/17  0718   BILITOT 0.3     Blood Culture: No results for  input(s): LABBLOO in the last 48 hours.  BMP:   Recent Labs  Lab 08/14/17  0353   GLU 81      K 4.0      CO2 20*   BUN 16   CREATININE 1.0   CALCIUM 8.5*   MG 1.7     CBC:   Recent Labs  Lab 08/13/17  0638 08/14/17 0353   WBC 7.66 7.02   HGB 11.0* 10.5*   HCT 34.4* 32.8*    254     CMP:   Recent Labs  Lab 08/13/17  0638 08/14/17 0353    140   K 4.1 4.0    108   CO2 19* 20*   GLU 90 81   BUN 16 16   CREATININE 1.0 1.0   CALCIUM 8.8 8.5*   ALBUMIN 2.5* 2.6*   ANIONGAP 12 12   EGFRNONAA >60.0 >60.0     Cardiac Markers: No results for input(s): CKMB, MYOGLOBIN, BNP, TROPISTAT in the last 48 hours.  Coagulation: No results for input(s): INR, APTT in the last 48 hours.    Invalid input(s): PT  Lactic Acid: No results for input(s): LACTATE in the last 48 hours.  Lipase: No results for input(s): LIPASE in the last 48 hours.  Lipid Panel: No results for input(s): CHOL, HDL, LDLCALC, TRIG, CHOLHDL in the last 48 hours.  Magnesium:   Recent Labs  Lab 08/13/17  0638 08/14/17 0353   MG 2.0 1.7     Pathology Results  (Last 10 years)               06/28/12 0000  Tissue Specimen to Pathology Final result        POCT Glucose: No results for input(s): POCTGLUCOSE in the last 48 hours.  Prealbumin: No results for input(s): PREALBUMIN in the last 48 hours.  Respiratory Culture: No results for input(s): GSRESP, RESPIRATORYC in the last 48 hours.  Troponin: No results for input(s): TROPONINI in the last 48 hours.  TSH:   Recent Labs  Lab 08/09/17  0342   TSH 1.419     Urine Culture: No results for input(s): LABURIN in the last 48 hours.  Urine Studies: No results for input(s): COLORU, APPEARANCEUA, PHUR, SPECGRAV, PROTEINUA, GLUCUA, KETONESU, BILIRUBINUA, OCCULTUA, NITRITE, UROBILINOGEN, LEUKOCYTESUR, RBCUA, WBCUA, BACTERIA, SQUAMEPITHEL, HYALINECASTS in the last 48 hours.    Invalid input(s): MAGGY  All pertinent labs within the past 24 hours have been reviewed.    Significant Imaging: I have  reviewed all pertinent imaging results/findings within the past 24 hours.

## 2017-08-15 NOTE — PROGRESS NOTES
Thank you for the referral.   For your information:    The following patient has been assigned to Queenie Mckinley RN with Outpatient Complex Care Management for high risk screening.    Reason: Complicated UTI (urinary tract infection)    Please contact [facility] at [phone] with any questions.    Thank you,  Corrie Miller

## 2017-08-15 NOTE — ASSESSMENT & PLAN NOTE
- patient with palpable mass and tenderness in suprapubic region in ED  - Archer re-placed and started on tamsulosin  - Archer left in place at discharge.  - Scheduled for Urology follow-up in 1 week.

## 2017-08-17 PROBLEM — N17.9 AKI (ACUTE KIDNEY INJURY): Status: ACTIVE | Noted: 2017-01-01

## 2017-08-17 PROBLEM — R31.9 HEMATURIA: Status: ACTIVE | Noted: 2017-01-01

## 2017-08-17 NOTE — HOSPITAL COURSE
Upon ED admission, bolused with 1 L fluids, Cefepime and Vancomycin.     08/18: NAEO. Vitals stable.  Oriented only to person. No further bleeding from urethra, Archer clear.    08/19: NAEO. Vitals stable.  Oriented to person and place. No further bleeding.    08/20: NAEO. Vitals stable.  Oriented x3. Pending discharge tom.     08/21: NAEO. Vitals stable.  Oriented x3. Pending discharge on 08/23.    08/23: NAEO. Vitals stable. Archer removed today at 3 am for voiding trial.  Patient failed voiding trial, Archer re-inserted.  * Post-voiding scan - ~350ml    08/24: NAEO. Vitals stable.  Reportedly tugging at his catheter this AM.  No blood around urethral meatus.

## 2017-08-17 NOTE — HPI
Mr Melo is an 87 y/o M with a history of epilepsy, vascular dementia (on memantine), Afib and pulmonary embolism (on eliquis), recent hip fracture with surgical repair on 7/3, recent UTI w/ bactrim, and a recent admission for altered mental status presents to the ED with hematuria.    Patient had barton placed during last stay (8/9 to 8/14) for obstructive uropathy and per daughter, nursing staff noted that he was handling barton frequently.  Has not had long-term barton before.  Today staff noticed gross blood in barton bag, sent to ED.  Patient unable to provide history. Daughter reported patient is at baseline.  Reports combative with staff during insertion of barton.    On his previous admission, patient was noted to have suprapubic tenderness and palpable bladder, with additional concern for UTI. UTI was treated with ceftriaxone. Barton helped to resolve retention with 1675 cc's drained. Noted to have concurrent ESTHER with 1.3 thought to be obstructive in etiology.     I have obtained history from ED records. Patient is altered.

## 2017-08-17 NOTE — ASSESSMENT & PLAN NOTE
Patient presents with hematuria and ESTHER with Cr of 1.6  - DDx includes trauma, BPH, obstructive stone, UTI  - Unlikely pre-renal, his blood pressure is normal, he does not appear volume down and is not volume overloaded. We are resuscitating him with fluids (recieving up to 2 L since admission).   - He was previous admitted for for hematuria that resolved with fluids and barton placement.  Bolused 1 L of fluids, running another 1 liter now. Continue tamsulosin. Started Finasteride for BPH.   - Obtaining Urinalysis and urine cultures. FeNa  - He is on apixaban- holding for now due to bleed. However, he has a significant risk of CV events. Once bleeding resolves need to restart apixaban. Keep barton in place, do not remove for 4-5 days until heals. Hemoglobin is 10.6 and stable. Intermittent barton flushes.   - Currently no fever, and vitals are stable, no evidence of SIRS. He does have a WBC count of 13.21, but no other abnormalities. Continue antibiotic regimen- he has one more dose of home Cephalexin (5 day course), tomorrow was his last day.No need to give his last dose as he has received vanc and cefepime in ED.   - morning BMP- he has received 1 L of fluid so far and with barton in place. Follow up with BMP and CBC at 4am.

## 2017-08-17 NOTE — ED PROVIDER NOTES
Encounter Date: 8/16/2017    SCRIBE #1 NOTE: I, Avis Weston, am scribing for, and in the presence of,  Dr. Cunningham. I have scribed the following portions of the note - the Resident attestation.       History     Chief Complaint   Patient presents with    Hematuria     From [facility]. States was recently hospitalized with urinary retention and discharged back to facility with urinary cathether. Facility staff states patient has been pulling on it and now theres blood in bag.      87 y/o M with h/o vascular dementia with extensive recent hospitalization presents from long term nursing facility for hematuria. Patient had barton placed during last stay and per daughter, nursing staff noted that he was handling barton frequently.  Has not had long-term barton before.  Today staff noticed gross blood in barton bag, sent to ED.  Patient unable to provide history. Daughter reports at baseline.  Reports combative with staff during insertion of barton.           Review of patient's allergies indicates:   Allergen Reactions    Penicillins Hives     Tolerated ceftriaxone     Past Medical History:   Diagnosis Date    Acquired hypothyroidism 2/20/2013    Acute deep vein thrombosis (DVT) of popliteal vein of right lower extremity 07/01/2017    Acute saddle pulmonary embolism without acute cor pulmonale 7/2/2017    Benign non-nodular prostatic hyperplasia without lower urinary tract symptoms     Closed intertrochanteric fracture of right femur s/p IMN on 7/3/17 7/1/2017    Essential hypertension     HEARING LOSS     uses hearing aids    Macular degeneration - Both Eyes 1/15/2013    Mixed hyperlipidemia 10/30/2012    Paroxysmal atrial fibrillation 7/2/2017    Partial symptomatic epilepsy with complex partial seizures, not intractable, without status epilepticus     Peripheral vascular disease 10/30/2012    Posterior vitreous detachment 1/15/2013    Pseudophakia of both eyes 1/15/2013    S/P AVR (aortic  valve replacement) 10/30/2012    Scoliosis (and kyphoscoliosis), idiopathic     Stroke     Vascular dementia without behavioral disturbance 10/9/2012    Vitamin D deficiency disease 7/6/2017     Past Surgical History:   Procedure Laterality Date    AORTIC VALVE REPLACEMENT  08/2011    23 mm porcine AVR    CARDIAC CATHETERIZATION  6/11    mild non-obstructive CAD    CATARACT EXTRACTION, BILATERAL      CHOLECYSTECTOMY      TON FILTER PLACEMENT  07/03/2017    Consider removal in 2 months     HEMORRHOID SURGERY      INTERTROCHANTERIC HIP FRACTURE SURGERY Right 07/03/2017    IM nail     Family History   Problem Relation Age of Onset    COPD Father     COPD Brother      Social History   Substance Use Topics    Smoking status: Never Smoker    Smokeless tobacco: Never Used    Alcohol use No     Review of Systems   Unable to perform ROS: Dementia       Physical Exam     Initial Vitals [08/16/17 1626]   BP Pulse Resp Temp SpO2   (!) 102/55 87 18 98.2 °F (36.8 °C) 98 %      MAP       70.67         Physical Exam    Nursing note and vitals reviewed.  Constitutional: He appears well-developed and well-nourished. He is not diaphoretic. No distress.   HENT:   Head: Normocephalic.   Right Ear: External ear normal.   Left Ear: External ear normal.   Nose: Nose normal.   Mouth/Throat: Oropharynx is clear and moist.   Eyes: EOM are normal. Pupils are equal, round, and reactive to light. Right eye exhibits no discharge. Left eye exhibits no discharge. No scleral icterus.   Neck: Normal range of motion. No tracheal deviation present. No JVD present.   Cardiovascular: Normal rate and regular rhythm. Exam reveals no gallop.    Pulmonary/Chest: Breath sounds normal. No stridor. No respiratory distress. He has no rales.   Abdominal: Soft. He exhibits no distension. There is no tenderness.   Genitourinary:   Genitourinary Comments: Acrher in place, gross blood noted in tubing and collection bag, no trauma to meatus  noted.    Musculoskeletal: Normal range of motion. He exhibits no edema or tenderness.   Neurological: He is alert.   Oriented only to self   Skin: Skin is warm and dry. Capillary refill takes less than 2 seconds.         ED Course   Procedures  Labs Reviewed   CBC W/ AUTO DIFFERENTIAL - Abnormal; Notable for the following:        Result Value    WBC 13.21 (*)     RBC 3.60 (*)     Hemoglobin 10.6 (*)     Hematocrit 32.1 (*)     RDW 14.9 (*)     Gran # 8.1 (*)     All other components within normal limits   COMPREHENSIVE METABOLIC PANEL - Abnormal; Notable for the following:     CO2 21 (*)     Glucose 122 (*)     BUN, Bld 24 (*)     Creatinine 1.6 (*)     Albumin 3.1 (*)     eGFR if  43.8 (*)     eGFR if non  37.9 (*)     All other components within normal limits   TROPONIN I - Abnormal; Notable for the following:     Troponin I 0.094 (*)     All other components within normal limits   VANCOMYCIN, TROUGH - Abnormal; Notable for the following:     Vancomycin-Trough 2.7 (*)     All other components within normal limits    Narrative:     Collection Instructions if not specified above:->30 minutes  prior to 4th dose   CULTURE, BLOOD   CULTURE, BLOOD   LACTIC ACID, PLASMA   B-TYPE NATRIURETIC PEPTIDE   URINALYSIS, REFLEX TO URINE CULTURE   VANCOMYCIN, RANDOM             Medical Decision Making:   History:   Old Medical Records: I decided to obtain old medical records.  Initial Assessment:   89 y/o M presents with hematuria, barton recently placed during extensive hospital stay, reported self-tampering with barton.  Concern for minor trauma vs. Obstruction. Will attempt to flush, measure bladder residual afterwards. Checking UA + cultures, basic labs, likely discharge.     Skinny Goncalves MD [facility]  [facility]  9:17 PM 8/16/2017    Clinical Tests:   Lab Tests: Ordered and Reviewed  Radiological Study: Ordered and Reviewed  Medical Tests: Ordered and Reviewed  ED  Management:  Bedside US showed Barton balloon in place. WBC returned >13, creatinine of 1.6 (elevated from baseline).  HR 90.  Have begun fluids, empiric antibiotics, ordered cardiac labs, CXR, will admit.    Skinny Goncalves MD [facility]  11:17 PM 8/16/2017    Other:   I have discussed this case with another health care provider.       <> Summary of the Discussion: Cardiology            Scribe Attestation:   Scribe #1: I performed the above scribed service and the documentation accurately describes the services I performed. I attest to the accuracy of the note.    Attending Attestation:   Physician Attestation Statement for Resident:  As the supervising MD   Physician Attestation Statement: I have personally seen and examined this patient.   I agree with the above history. -: Pt presents with hematuria after barton.    As the supervising MD I agree with the above PE.   -: Benign abdomen.    As the supervising MD I agree with the above treatment, course, plan, and disposition.   -: Evaluated the barton with flushing. Ultrasound shows urine in balloon and bladder. Pt does have bump in creatine. Discussed with cardiology who will bring him into the hospital.           Physician Attestation for Scribe:  Physician Attestation Statement for Scribe #1: I, Dr. Cunningham, reviewed documentation, as scribed by Avis Weston in my presence, and it is both accurate and complete.                 ED Course     Clinical Impression:   The primary encounter diagnosis was ESTHER (acute kidney injury). Diagnoses of Sepsis and SIRS (systemic inflammatory response syndrome) were also pertinent to this visit.    Disposition:   Disposition: Admitted                        Skinny Goncalves MD  Resident  08/17/17 0144

## 2017-08-17 NOTE — ASSESSMENT & PLAN NOTE
Pulse noted to be irregularly irregular on exam, but it is not tachycardic. Holding epixaban for now due to concern about bleed. However, high risk for thromboembolic events, and need to consider restarting as soon as bleeding is resolved.

## 2017-08-17 NOTE — SUBJECTIVE & OBJECTIVE
Past Medical History:   Diagnosis Date    Acquired hypothyroidism 2/20/2013    Acute deep vein thrombosis (DVT) of popliteal vein of right lower extremity 07/01/2017    Acute saddle pulmonary embolism without acute cor pulmonale 7/2/2017    Benign non-nodular prostatic hyperplasia without lower urinary tract symptoms     Closed intertrochanteric fracture of right femur s/p IMN on 7/3/17 7/1/2017    Essential hypertension     HEARING LOSS     uses hearing aids    Macular degeneration - Both Eyes 1/15/2013    Mixed hyperlipidemia 10/30/2012    Paroxysmal atrial fibrillation 7/2/2017    Partial symptomatic epilepsy with complex partial seizures, not intractable, without status epilepticus     Peripheral vascular disease 10/30/2012    Posterior vitreous detachment 1/15/2013    Pseudophakia of both eyes 1/15/2013    S/P AVR (aortic valve replacement) 10/30/2012    Scoliosis (and kyphoscoliosis), idiopathic     Stroke     Vascular dementia without behavioral disturbance 10/9/2012    Vitamin D deficiency disease 7/6/2017       Past Surgical History:   Procedure Laterality Date    AORTIC VALVE REPLACEMENT  08/2011    23 mm porcine AVR    CARDIAC CATHETERIZATION  6/11    mild non-obstructive CAD    CATARACT EXTRACTION, BILATERAL      CHOLECYSTECTOMY      TON FILTER PLACEMENT  07/03/2017    Consider removal in 2 months     HEMORRHOID SURGERY      INTERTROCHANTERIC HIP FRACTURE SURGERY Right 07/03/2017    IM nail       Review of patient's allergies indicates:   Allergen Reactions    Penicillins Hives     Tolerated ceftriaxone       No current facility-administered medications on file prior to encounter.      Current Outpatient Prescriptions on File Prior to Encounter   Medication Sig    acetaminophen (TYLENOL) 650 MG TbSR Take 650 mg by mouth every 6 (six) hours as needed (mild to moderate pain).    apixaban 5 mg Tab Take 2 tablets (10mg) by mouth twice daily for 5 more days with end date of  7/12/2017 then decrease dose to 1 tablet (5mg) my mouth twice daily on 7/13/2017 and continue indefinitely for treatment of PE and also for stroke prevention for atrial fibrillation. (Patient taking differently: Take 5 mg by mouth 2 (two) times daily. )    atorvastatin (LIPITOR) 40 MG tablet TAKE 1 TABLET BY MOUTH EVERY DAY    calcium carbonate (OS-ROSA) 500 mg calcium (1,250 mg) tablet Take 2 tablets (1,000 mg total) by mouth once daily. (Patient taking differently: Take 1,000 mg by mouth 2 (two) times daily. )    carvedilol (COREG) 25 MG tablet Take 1 tablet (25 mg total) by mouth 2 (two) times daily.    cephALEXin (KEFLEX) 500 MG capsule Take 1 capsule (500 mg total) by mouth every 12 (twelve) hours. Last day 8/18/17    ergocalciferol (ERGOCALCIFEROL) 50,000 unit Cap Take 1 capsule (50,000 Units total) by mouth Every Friday. (Patient taking differently: Take 50,000 Units by mouth every Tuesday. )    gabapentin (NEURONTIN) 800 MG tablet Take 1 tablet (800 mg total) by mouth 3 (three) times daily.    levetiracetam (KEPPRA) 750 MG Tab Take 1 tablet (750 mg total) by mouth 2 (two) times daily.    levothyroxine (SYNTHROID) 150 MCG tablet Take 150 mcg by mouth once daily.    memantine (NAMENDA) 10 MG Tab 1 BID (Patient taking differently: Take 10 mg by mouth 2 (two) times daily. )    multivitamin (ONE DAILY MULTIVITAMIN) per tablet Take 1 tablet by mouth once daily.    phenobarbital (LUMINAL) 32.4 MG tablet Take 1 tablet (32.4 mg total) by mouth 3 (three) times daily.    senna-docusate 8.6-50 mg (PERICOLACE) 8.6-50 mg per tablet Take 1 tablet by mouth 2 (two) times daily as needed for Constipation.    tamsulosin (FLOMAX) 0.4 mg Cp24 Take 1 capsule (0.4 mg total) by mouth once daily.    zinc sulfate 220 mg Tab Take 1 tablet by mouth once daily.     Family History     Problem Relation (Age of Onset)    COPD Father, Brother        Social History Main Topics    Smoking status: Never Smoker    Smokeless  tobacco: Never Used    Alcohol use No    Drug use: No    Sexual activity: Not on file     Review of Systems   Unable to perform ROS: Mental status change     Objective:     Vital Signs (Most Recent):  Temp: 98.2 °F (36.8 °C) (08/16/17 1626)  Pulse: 68 (08/17/17 0132)  Resp: 17 (08/17/17 0132)  BP: (!) 158/69 (08/17/17 0132)  SpO2: 97 % (08/17/17 0119) Vital Signs (24h Range):  Temp:  [98.2 °F (36.8 °C)] 98.2 °F (36.8 °C)  Pulse:  [68-90] 68  Resp:  [17-18] 17  SpO2:  [94 %-98 %] 97 %  BP: (102-158)/(55-69) 158/69     Weight: 81.6 kg (180 lb)  Body mass index is 24.41 kg/m².    Physical Exam   Eyes: EOM are normal. Pupils are equal, round, and reactive to light.   Cardiovascular: Normal rate, regular rhythm and intact distal pulses.    No murmur heard.  Irregular pulse   Pulmonary/Chest: Effort normal and breath sounds normal.   Abdominal: There is guarding (some abdominal tenderness in suprapubic area).   Musculoskeletal: He exhibits no edema or deformity.   Neurological:   Not oriented.   Skin: Skin is warm and dry. No rash noted. He is not diaphoretic.        Significant Labs:   BMP:   Recent Labs  Lab 08/16/17  2139   *      K 3.9      CO2 21*   BUN 24*   CREATININE 1.6*   CALCIUM 9.1     Coagulation: No results for input(s): INR, APTT in the last 48 hours.    Invalid input(s): PT  POCT Glucose: No results for input(s): POCTGLUCOSE in the last 48 hours.  Urine Culture: No results for input(s): LABURIN in the last 48 hours.  Urine Studies: No results for input(s): COLORU, APPEARANCEUA, PHUR, SPECGRAV, PROTEINUA, GLUCUA, KETONESU, BILIRUBINUA, OCCULTUA, NITRITE, UROBILINOGEN, LEUKOCYTESUR, RBCUA, WBCUA, BACTERIA, SQUAMEPITHEL, HYALINECASTS in the last 48 hours.    Invalid input(s): WRIGHTSUR    Significant Imaging: I have reviewed and interpreted all pertinent imaging results/findings within the past 24 hours.

## 2017-08-17 NOTE — PROGRESS NOTES
Patient admitted overnight secondary to hematuria. Likely secondary to urethral trauma from barton dislodging. Also with ESTHER which has improved since admit. Will continue to monitor renal function and mental status and likely d/c back to nursing home tomorrow. Continue Keflex for 3 more days for UTI.     Felicia Avitia MD

## 2017-08-17 NOTE — PLAN OF CARE
Problem: Patient Care Overview  Goal: Plan of Care Review  Outcome: Ongoing (interventions implemented as appropriate)  Patient demonstrating intermittent confusion over shift. Sister in-law at bedside almost all day. Zero falls or skin breaks. 3 soft Bms in either diaper or on bedpan. Urine continues to be dark and concentrated, blood noted to urethral opening in small amounts. Catheter care performed 4 times this shift. POC focusing on safety and strategies to prevent skin breaks. POC being adhered to.

## 2017-08-17 NOTE — H&P
[facility] Hospital Medicine  History & Physical    Patient Name: Virgil Melo Jr.  MRN: [id number]  Admission Date: 8/16/2017  Attending Physician: LUIS A APONTE  Primary Care Provider: Luis A Milton MD    [facility] Medicine Team: [facility] HOSP MED 1 Juan Grijalva MD     Patient information was obtained from ER records.     Subjective:     Principal Problem: Hematuria    Chief Complaint:   Chief Complaint   Patient presents with    Hematuria     From [facility]. States was recently hospitalized with urinary retention and discharged back to facility with urinary cathether. Facility staff states patient has been pulling on it and now theres blood in bag.         HPI: Mr Melo is an 87 y/o M with a history of epilepsy, vascular dementia (on memantine), Afib and pulmonary embolism (on eliquis), recent hip fracture with surgical repair on 7/3, recent UTI w/ bactrim, and a recent admission for altered mental status presents to the ED with hematuria.    Patient had barton placed during last stay (8/9 to 8/14) for obstructive uropathy and per daughter, nursing staff noted that he was handling barton frequently.  Has not had long-term barton before.  Today staff noticed gross blood in barton bag, sent to ED.  Patient unable to provide history. Daughter reported patient is at baseline.  Reports combative with staff during insertion of barton.    On his previous admission, patient was noted to have suprapubic tenderness and palpable bladder, with additional concern for UTI. UTI was treated with ceftriaxone. Barton helped to resolve retention with 1675 cc's drained. Noted to have concurrent ESTHER with 1.3 thought to be obstructive in etiology.     I have obtained history from ED records. Patient is altered.     Past Medical History:   Diagnosis Date    Acquired hypothyroidism 2/20/2013    Acute deep vein thrombosis (DVT) of popliteal vein of right lower extremity 07/01/2017    Acute saddle  pulmonary embolism without acute cor pulmonale 7/2/2017    Benign non-nodular prostatic hyperplasia without lower urinary tract symptoms     Closed intertrochanteric fracture of right femur s/p IMN on 7/3/17 7/1/2017    Essential hypertension     HEARING LOSS     uses hearing aids    Macular degeneration - Both Eyes 1/15/2013    Mixed hyperlipidemia 10/30/2012    Paroxysmal atrial fibrillation 7/2/2017    Partial symptomatic epilepsy with complex partial seizures, not intractable, without status epilepticus     Peripheral vascular disease 10/30/2012    Posterior vitreous detachment 1/15/2013    Pseudophakia of both eyes 1/15/2013    S/P AVR (aortic valve replacement) 10/30/2012    Scoliosis (and kyphoscoliosis), idiopathic     Stroke     Vascular dementia without behavioral disturbance 10/9/2012    Vitamin D deficiency disease 7/6/2017       Past Surgical History:   Procedure Laterality Date    AORTIC VALVE REPLACEMENT  08/2011    23 mm porcine AVR    CARDIAC CATHETERIZATION  6/11    mild non-obstructive CAD    CATARACT EXTRACTION, BILATERAL      CHOLECYSTECTOMY      TON FILTER PLACEMENT  07/03/2017    Consider removal in 2 months     HEMORRHOID SURGERY      INTERTROCHANTERIC HIP FRACTURE SURGERY Right 07/03/2017    IM nail       Review of patient's allergies indicates:   Allergen Reactions    Penicillins Hives     Tolerated ceftriaxone       No current facility-administered medications on file prior to encounter.      Current Outpatient Prescriptions on File Prior to Encounter   Medication Sig    acetaminophen (TYLENOL) 650 MG TbSR Take 650 mg by mouth every 6 (six) hours as needed (mild to moderate pain).    apixaban 5 mg Tab Take 2 tablets (10mg) by mouth twice daily for 5 more days with end date of 7/12/2017 then decrease dose to 1 tablet (5mg) my mouth twice daily on 7/13/2017 and continue indefinitely for treatment of PE and also for stroke prevention for atrial fibrillation.  (Patient taking differently: Take 5 mg by mouth 2 (two) times daily. )    atorvastatin (LIPITOR) 40 MG tablet TAKE 1 TABLET BY MOUTH EVERY DAY    calcium carbonate (OS-ROSA) 500 mg calcium (1,250 mg) tablet Take 2 tablets (1,000 mg total) by mouth once daily. (Patient taking differently: Take 1,000 mg by mouth 2 (two) times daily. )    carvedilol (COREG) 25 MG tablet Take 1 tablet (25 mg total) by mouth 2 (two) times daily.    cephALEXin (KEFLEX) 500 MG capsule Take 1 capsule (500 mg total) by mouth every 12 (twelve) hours. Last day 8/18/17    ergocalciferol (ERGOCALCIFEROL) 50,000 unit Cap Take 1 capsule (50,000 Units total) by mouth Every Friday. (Patient taking differently: Take 50,000 Units by mouth every Tuesday. )    gabapentin (NEURONTIN) 800 MG tablet Take 1 tablet (800 mg total) by mouth 3 (three) times daily.    levetiracetam (KEPPRA) 750 MG Tab Take 1 tablet (750 mg total) by mouth 2 (two) times daily.    levothyroxine (SYNTHROID) 150 MCG tablet Take 150 mcg by mouth once daily.    memantine (NAMENDA) 10 MG Tab 1 BID (Patient taking differently: Take 10 mg by mouth 2 (two) times daily. )    multivitamin (ONE DAILY MULTIVITAMIN) per tablet Take 1 tablet by mouth once daily.    phenobarbital (LUMINAL) 32.4 MG tablet Take 1 tablet (32.4 mg total) by mouth 3 (three) times daily.    senna-docusate 8.6-50 mg (PERICOLACE) 8.6-50 mg per tablet Take 1 tablet by mouth 2 (two) times daily as needed for Constipation.    tamsulosin (FLOMAX) 0.4 mg Cp24 Take 1 capsule (0.4 mg total) by mouth once daily.    zinc sulfate 220 mg Tab Take 1 tablet by mouth once daily.     Family History     Problem Relation (Age of Onset)    COPD Father, Brother        Social History Main Topics    Smoking status: Never Smoker    Smokeless tobacco: Never Used    Alcohol use No    Drug use: No    Sexual activity: Not on file     Review of Systems   Unable to perform ROS: Mental status change     Objective:     Vital Signs  (Most Recent):  Temp: 98.2 °F (36.8 °C) (08/16/17 1626)  Pulse: 68 (08/17/17 0132)  Resp: 17 (08/17/17 0132)  BP: (!) 158/69 (08/17/17 0132)  SpO2: 97 % (08/17/17 0119) Vital Signs (24h Range):  Temp:  [98.2 °F (36.8 °C)] 98.2 °F (36.8 °C)  Pulse:  [68-90] 68  Resp:  [17-18] 17  SpO2:  [94 %-98 %] 97 %  BP: (102-158)/(55-69) 158/69     Weight: 81.6 kg (180 lb)  Body mass index is 24.41 kg/m².    Physical Exam   Eyes: EOM are normal. Pupils are equal, round, and reactive to light.   Cardiovascular: Normal rate, regular rhythm and intact distal pulses.    No murmur heard.  Irregular pulse   Pulmonary/Chest: Effort normal and breath sounds normal.   Abdominal: There is guarding (some abdominal tenderness in suprapubic area).   Musculoskeletal: He exhibits no edema or deformity.   Neurological:   Not oriented.   Skin: Skin is warm and dry. No rash noted. He is not diaphoretic.        Significant Labs:   BMP:   Recent Labs  Lab 08/16/17 2139   *      K 3.9      CO2 21*   BUN 24*   CREATININE 1.6*   CALCIUM 9.1     Coagulation: No results for input(s): INR, APTT in the last 48 hours.    Invalid input(s): PT  POCT Glucose: No results for input(s): POCTGLUCOSE in the last 48 hours.  Urine Culture: No results for input(s): LABURIN in the last 48 hours.  Urine Studies: No results for input(s): COLORU, APPEARANCEUA, PHUR, SPECGRAV, PROTEINUA, GLUCUA, KETONESU, BILIRUBINUA, OCCULTUA, NITRITE, UROBILINOGEN, LEUKOCYTESUR, RBCUA, WBCUA, BACTERIA, SQUAMEPITHEL, HYALINECASTS in the last 48 hours.    Invalid input(s): WRIGHTSUR    Significant Imaging: I have reviewed and interpreted all pertinent imaging results/findings within the past 24 hours.    Assessment/Plan:     ESTHER (acute kidney injury)    Patient presents with hematuria and ESTHER with Cr of 1.6  - DDx includes trauma, BPH, obstructive stone, UTI  - Unlikely pre-renal, his blood pressure is normal, he does not appear volume down and is not volume  overloaded. We are resuscitating him with fluids (recieving up to 2 L since admission).   - He was previous admitted for for hematuria that resolved with fluids and barton placement.  Bolused 1 L of fluids, running another 1 liter now. Continue tamsulosin. Started Finasteride for BPH.   - Obtaining Urinalysis and urine cultures. FeNa  - He is on apixaban- holding for now due to bleed. However, he has a significant risk of CV events. Once bleeding resolves need to restart apixaban. Keep barton in place, do not remove for 4-5 days until heals. Hemoglobin is 10.6 and stable. Intermittent barton flushes.   - Currently no fever, and vitals are stable, no evidence of SIRS. He does have a WBC count of 13.21, but no other abnormalities. Continue antibiotic regimen- he has one more dose of home Cephalexin (5 day course), tomorrow was his last day.No need to give his last dose as he has received vanc and cefepime in ED.   - morning BMP- he has received 1 L of fluid so far and with barton in place. Follow up with BMP and CBC at 4am.           Paroxysmal atrial fibrillation    Pulse noted to be irregularly irregular on exam, but it is not tachycardic. Holding epixaban for now due to concern about bleed. However, high risk for thromboembolic events, and need to consider restarting as soon as bleeding is resolved.         Essential hypertension    Continue coreg for essential hypertension        Urinary retention      See above.           Acquired hypothyroidism    Continue levothyroxine        Vascular dementia without behavioral disturbance    Continue mematine          Partial symptomatic epilepsy with complex partial seizures, not intractable, without status epilepticus    Continue Keppra and phenobarbitol          VTE Risk Mitigation     None             Juan Grijalva MD  Department of Hospital Medicine   [facility]

## 2017-08-17 NOTE — ED TRIAGE NOTES
Pt presents to ED with c/o hematuria. Pt had barton placed last week s/p urinary retention and pt since then has been pulling on catheter line and staff at NH noticed blood in the barton bag. Pt has dementia and is not aware what the barton catheter is and plays with it and pulls it. Pt denies pain at present. Blood noted in barton bag and around insertion site of catheter.

## 2017-08-18 NOTE — PLAN OF CARE
MICAH received call from Vicky with [facility] stating that pt can not return to the assisted living with a barton, it will need to be removed prior to his return and if not, Vicky suggested nursing home placement.     MICHA updated attending Jelani HDEZ Rn of above.

## 2017-08-18 NOTE — SUBJECTIVE & OBJECTIVE
Interval History: NAEO. Tugging at [employer] today morning.  Alert but oriented only to self.  Vitals stable. No hematuria    Review of Systems   Constitutional: Negative for chills and fever.   Respiratory: Negative for cough, chest tightness and shortness of breath.    Cardiovascular: Negative for chest pain and palpitations.   Gastrointestinal: Negative for abdominal pain, constipation, diarrhea, nausea and vomiting.   Genitourinary: Negative for hematuria and urgency.   Neurological: Negative for dizziness, weakness and light-headedness.   Psychiatric/Behavioral: Negative for agitation and behavioral problems.     Objective:     Vital Signs (Most Recent):  Temp: 98 °F (36.7 °C) (08/18/17 1253)  Pulse: 79 (08/18/17 1253)  Resp: 16 (08/18/17 1253)  BP: 139/64 (08/18/17 1253)  SpO2: (!) 94 % (08/18/17 1253) Vital Signs (24h Range):  Temp:  [97.6 °F (36.4 °C)-98.7 °F (37.1 °C)] 98 °F (36.7 °C)  Pulse:  [79-93] 79  Resp:  [16-18] 16  SpO2:  [94 %-98 %] 94 %  BP: (105-145)/(56-84) 139/64     Weight: 83 kg (182 lb 14.4 oz)  Body mass index is 24.13 kg/m².    Intake/Output Summary (Last 24 hours) at 08/18/17 1426  Last data filed at 08/18/17 1252   Gross per 24 hour   Intake             1040 ml   Output             2650 ml   Net            -1610 ml      Physical Exam   Cardiovascular: Normal rate, regular rhythm and normal heart sounds.    Pulmonary/Chest: Effort normal and breath sounds normal.   Abdominal: Soft. Bowel sounds are normal.   Neurological: He is alert.   Oriented only to person   Nursing note and vitals reviewed.      Significant Labs:   A1C: No results for input(s): HGBA1C in the last 4320 hours.  ABGs: No results for input(s): PH, PCO2, HCO3, POCSATURATED, BE, TOTALHB, COHB, METHB, O2HB, POCFIO2 in the last 48 hours.  Bilirubin:   Recent Labs  Lab 08/09/17  0718 08/16/17  2139   BILITOT 0.3 0.3     Blood Culture:   Recent Labs  Lab 08/16/17  2331   LABBLOO No Growth to date  No Growth to date  No Growth  to date  No Growth to date     BMP:   Recent Labs  Lab 08/17/17  0511 08/18/17  0651    98    142   K 3.4* 4.1   * 112*   CO2 18* 21*   BUN 22 14   CREATININE 1.4 1.1   CALCIUM 8.3* 8.8   MG 1.8  --      CBC:   Recent Labs  Lab 08/16/17 2139 08/18/17  0651   WBC 13.21* 8.57   HGB 10.6* 10.4*   HCT 32.1* 32.2*    240     CMP:   Recent Labs  Lab 08/16/17 2139 08/17/17  0511 08/18/17  0651    140 142   K 3.9 3.4* 4.1    111* 112*   CO2 21* 18* 21*   * 110 98   BUN 24* 22 14   CREATININE 1.6* 1.4 1.1   CALCIUM 9.1 8.3* 8.8   PROT 7.2  --   --    ALBUMIN 3.1*  --   --    BILITOT 0.3  --   --    ALKPHOS 102  --   --    AST 17  --   --    ALT 17  --   --    ANIONGAP 11 11 9   EGFRNONAA 37.9* 44.5* 59.6*     Cardiac Markers:   Recent Labs  Lab 08/16/17  2331   BNP 62     Coagulation: No results for input(s): INR, APTT in the last 48 hours.    Invalid input(s): PT  Lactic Acid:   Recent Labs  Lab 08/16/17  2331   LACTATE 0.8     Lipase: No results for input(s): LIPASE in the last 48 hours.  Lipid Panel: No results for input(s): CHOL, HDL, LDLCALC, TRIG, CHOLHDL in the last 48 hours.  Magnesium:   Recent Labs  Lab 08/17/17  0511   MG 1.8     Pathology Results  (Last 10 years)               06/28/12 0000  Tissue Specimen to Pathology Final result        POCT Glucose: No results for input(s): POCTGLUCOSE in the last 48 hours.  Prealbumin: No results for input(s): PREALBUMIN in the last 48 hours.  Respiratory Culture: No results for input(s): GSRESP, RESPIRATORYC in the last 48 hours.  Troponin:   Recent Labs  Lab 08/16/17  2331   TROPONINI 0.094*     TSH:   Recent Labs  Lab 08/09/17  0342   TSH 1.419     Urine Culture: No results for input(s): LABURIN in the last 48 hours.  Urine Studies:   Recent Labs  Lab 08/17/17  0307   COLORU Brown*   APPEARANCEUA Cloudy*   PHUR 6.0   SPECGRAV 1.020   PROTEINUA 2+*   GLUCUA Negative   KETONESU Trace*   BILIRUBINUA Negative   OCCULTUA 3+*    NITRITE Negative   UROBILINOGEN Negative   LEUKOCYTESUR Negative   RBCUA >100*   WBCUA 6*   BACTERIA Occasional   SQUAMEPITHEL 2   HYALINECASTS 0     All pertinent labs within the past 24 hours have been reviewed.    Significant Imaging: I have reviewed all pertinent imaging results/findings within the past 24 hours.

## 2017-08-18 NOTE — ASSESSMENT & PLAN NOTE
Patient presents with hematuria and ESTHER with Cr of 1.6  - DDx includes trauma, BPH, obstructive stone, UTI  - However his ESTHER could be pre-renal due to poor oral intake in the NH. Encourage oral fluid intake with meals   - He was previous admitted for for hematuria that resolved with fluids and barton placement.  Given 2 boluses of IVF in the ER.  - Cont Flomax and Finasteride.  - Hg 9.3, Eliquis restarted.  - WBCs 3070, Vitals stable.   - Continue Keflex (tomorrow is last day).

## 2017-08-18 NOTE — PLAN OF CARE
Problem: Patient Care Overview  Goal: Plan of Care Review  Outcome: Ongoing (interventions implemented as appropriate)  Reviewed POC w patient and his sister in-law, poor retention of education noted. He is VERY confused intermittently and she has English as a secondary language. Uneventful day, no falls or skin breaks, vitals stable. He is definitely less lucid when his family leave his bedside, more at risk for attempting to get out of bed, safety camera in use.

## 2017-08-18 NOTE — PLAN OF CARE
MICAH placed call to pt's daughter, Marta at [phone], to discuss d/c planning, however there was no answer and MICAH left detailed message explaining that pt is unable to return to [facility] due to his barton and therefore skilled placement is the recommendation.      MICAH called [phone] to complete LOCET, Next SW will completed PASRR to A [phone]

## 2017-08-18 NOTE — PROGRESS NOTES
[facility] Hospital Medicine  Progress Note    Patient Name: Virgil Melo Jr.  MRN: [id number]  Patient Class: OP- Observation   Admission Date: 8/16/2017  Length of Stay: 0 days  Attending Physician: Tyler Adan MD  Primary Care Provider: Luis A Milton MD    [facility] Medicine Team: [facility] HOSP MED 1 Scott Ramos MD    Subjective:     Principal Problem:Hematuria    HPI:  Mr Melo is an 87 y/o M with a history of epilepsy, vascular dementia (on memantine), Afib and pulmonary embolism (on eliquis), recent hip fracture with surgical repair on 7/3, recent UTI w/ bactrim, and a recent admission for altered mental status presents to the ED with hematuria.    Patient had barton placed during last stay (8/9 to 8/14) for obstructive uropathy and per daughter, nursing staff noted that he was handling barton frequently.  Has not had long-term barton before.  Today staff noticed gross blood in barton bag, sent to ED.  Patient unable to provide history. Daughter reported patient is at baseline.  Reports combative with staff during insertion of barton.    On his previous admission, patient was noted to have suprapubic tenderness and palpable bladder, with additional concern for UTI. UTI was treated with ceftriaxone. Barton helped to resolve retention with 1675 cc's drained. Noted to have concurrent ESTHER with 1.3 thought to be obstructive in etiology.     I have obtained history from ED records. Patient is altered.     Hospital Course:  Upon ED admission, bolused with 1 L fluids, Cefepime and Vancomycin.     08/18: NAEO. Vitals stable.  Oriented only to person. No further bleeding from urethra, Barton clear.    Interval History: NAEO. Tugging at [employer] today morning.  Alert but oriented only to self.  Vitals stable. No hematuria    Review of Systems   Constitutional: Negative for chills and fever.   Respiratory: Negative for cough, chest tightness and shortness of breath.    Cardiovascular: Negative  for chest pain and palpitations.   Gastrointestinal: Negative for abdominal pain, constipation, diarrhea, nausea and vomiting.   Genitourinary: Negative for hematuria and urgency.   Neurological: Negative for dizziness, weakness and light-headedness.   Psychiatric/Behavioral: Negative for agitation and behavioral problems.     Objective:     Vital Signs (Most Recent):  Temp: 98 °F (36.7 °C) (08/18/17 1253)  Pulse: 79 (08/18/17 1253)  Resp: 16 (08/18/17 1253)  BP: 139/64 (08/18/17 1253)  SpO2: (!) 94 % (08/18/17 1253) Vital Signs (24h Range):  Temp:  [97.6 °F (36.4 °C)-98.7 °F (37.1 °C)] 98 °F (36.7 °C)  Pulse:  [79-93] 79  Resp:  [16-18] 16  SpO2:  [94 %-98 %] 94 %  BP: (105-145)/(56-84) 139/64     Weight: 83 kg (182 lb 14.4 oz)  Body mass index is 24.13 kg/m².    Intake/Output Summary (Last 24 hours) at 08/18/17 1426  Last data filed at 08/18/17 1252   Gross per 24 hour   Intake             1040 ml   Output             2650 ml   Net            -1610 ml      Physical Exam   Cardiovascular: Normal rate, regular rhythm and normal heart sounds.    Pulmonary/Chest: Effort normal and breath sounds normal.   Abdominal: Soft. Bowel sounds are normal.   Neurological: He is alert.   Oriented only to person   Nursing note and vitals reviewed.      Significant Labs:   A1C: No results for input(s): HGBA1C in the last 4320 hours.  ABGs: No results for input(s): PH, PCO2, HCO3, POCSATURATED, BE, TOTALHB, COHB, METHB, O2HB, POCFIO2 in the last 48 hours.  Bilirubin:   Recent Labs  Lab 08/09/17  0718 08/16/17  2139   BILITOT 0.3 0.3     Blood Culture:   Recent Labs  Lab 08/16/17  2331   LABBLOO No Growth to date  No Growth to date  No Growth to date  No Growth to date     BMP:   Recent Labs  Lab 08/17/17  0511 08/18/17  0651    98    142   K 3.4* 4.1   * 112*   CO2 18* 21*   BUN 22 14   CREATININE 1.4 1.1   CALCIUM 8.3* 8.8   MG 1.8  --      CBC:   Recent Labs  Lab 08/16/17  2139 08/18/17  0651   WBC 13.21*  8.57   HGB 10.6* 10.4*   HCT 32.1* 32.2*    240     CMP:   Recent Labs  Lab 08/16/17  2139 08/17/17  0511 08/18/17  0651    140 142   K 3.9 3.4* 4.1    111* 112*   CO2 21* 18* 21*   * 110 98   BUN 24* 22 14   CREATININE 1.6* 1.4 1.1   CALCIUM 9.1 8.3* 8.8   PROT 7.2  --   --    ALBUMIN 3.1*  --   --    BILITOT 0.3  --   --    ALKPHOS 102  --   --    AST 17  --   --    ALT 17  --   --    ANIONGAP 11 11 9   EGFRNONAA 37.9* 44.5* 59.6*     Cardiac Markers:   Recent Labs  Lab 08/16/17  2331   BNP 62     Coagulation: No results for input(s): INR, APTT in the last 48 hours.    Invalid input(s): PT  Lactic Acid:   Recent Labs  Lab 08/16/17  2331   LACTATE 0.8     Lipase: No results for input(s): LIPASE in the last 48 hours.  Lipid Panel: No results for input(s): CHOL, HDL, LDLCALC, TRIG, CHOLHDL in the last 48 hours.  Magnesium:   Recent Labs  Lab 08/17/17  0511   MG 1.8     Pathology Results  (Last 10 years)               06/28/12 0000  Tissue Specimen to Pathology Final result        POCT Glucose: No results for input(s): POCTGLUCOSE in the last 48 hours.  Prealbumin: No results for input(s): PREALBUMIN in the last 48 hours.  Respiratory Culture: No results for input(s): GSRESP, RESPIRATORYC in the last 48 hours.  Troponin:   Recent Labs  Lab 08/16/17  2331   TROPONINI 0.094*     TSH:   Recent Labs  Lab 08/09/17  0342   TSH 1.419     Urine Culture: No results for input(s): LABURIN in the last 48 hours.  Urine Studies:   Recent Labs  Lab 08/17/17  0307   COLORU Brown*   APPEARANCEUA Cloudy*   PHUR 6.0   SPECGRAV 1.020   PROTEINUA 2+*   GLUCUA Negative   KETONESU Trace*   BILIRUBINUA Negative   OCCULTUA 3+*   NITRITE Negative   UROBILINOGEN Negative   LEUKOCYTESUR Negative   RBCUA >100*   WBCUA 6*   BACTERIA Occasional   SQUAMEPITHEL 2   HYALINECASTS 0     All pertinent labs within the past 24 hours have been reviewed.    Significant Imaging: I have reviewed all pertinent imaging results/findings  within the past 24 hours.    Assessment/Plan:      ESTHER (acute kidney injury)    Patient presents with hematuria and ESTHER with Cr of 1.6  - DDx includes trauma, BPH, obstructive stone, UTI  - However his ESTHER could be pre-renal due to poor oral intake in the NH. Encourage oral fluid intake with meals   - He was previous admitted for for hematuria that resolved with fluids and barton placement.  Given 2 boluses of IVF in the ER.  - Cont Flomax and Finasteride.  - Hg 9.3, Eliquis restarted.  - WBCs 3070, Vitals stable.   - Continue Keflex (tomorrow is last day).          Complicated UTI (urinary tract infection)    - Was being treated for UTI during prior admission last week. Discharged on Keflex 500mg Q12 for 7 days.  - Admitted again on 08/16 for AMS and hematuria (was tugging on his catheter in the nursing home).  - UA at admission showed 6+ leukocytes, nitrite neg; No e/o UTI  - Continue Keflex. Last dose tomm.          Paroxysmal atrial fibrillation    AR 86, irregular rhythm  Apixaban restarted.        Urinary retention    - Barton's in place for urinary retention.  - Barton care  - Cont Flomax and Finasteride        Essential hypertension    Continue coreg 12.5mg BID.        Vascular dementia without behavioral disturbance    Continue mematine  Seroquel 50mg once daily as needed.          Partial symptomatic epilepsy with complex partial seizures, not intractable, without status epilepticus    Continue Keppra and phenobarbitol        Acquired hypothyroidism    Continue levothyroxine          VTE Risk Mitigation         Ordered     apixaban tablet 5 mg  2 times daily     Route:  Oral        08/18/17 1107              Scott Ramos MD  Department of Hospital Medicine   [facility]

## 2017-08-18 NOTE — PHARMACY MED REC
"Admission Medication Reconciliation - Pharmacy Consult Note    The home medication history was taken by   Based on information gathered and subsequent review by the clinical pharmacist, the items below may need attention.     You may go to "Admission" then "Reconcile Home Medications" tabs to review and/or act upon these items. Based on information gathered and subsequent review by the clinical pharmacist, the items below may need attention.    Potentially problematic discrepancies with current MAR  o Patient is taking a drug DIFFERENTLY than how ordered upon admit  o Carvedilol 25mg PO q12h (Currently taking 12.5mg PO BID)    Sheila Arana, PharmD  [id number]        Patient's prior to admission medication regimen was as follows:  Medication Sig    acetaminophen (TYLENOL) 650 MG TbSR Take 650 mg by mouth every 6 (six) hours as needed (mild to moderate pain).    apixaban 5 mg Tab Take 5 mg by mouth 2 (two) times daily.     atorvastatin (LIPITOR) 40 MG tablet TAKE 1 TABLET BY MOUTH EVERY DAY    calcium carbonate (OS-ROSA) 500 mg calcium (1,250 mg) tablet Take 2 tablets (1,000 mg total) by mouth once daily.    carvedilol (COREG) 25 MG tablet Take 1 tablet (25 mg total) by mouth 2 (two) times daily.    cephALEXin (KEFLEX) 500 MG capsule Take 1 capsule (500 mg total) by mouth every 12 (twelve) hours. Last day 8/18/17    ergocalciferol (ERGOCALCIFEROL) 50,000 unit Cap Take 1 capsule (50,000 Units total) by mouth Every Friday.    gabapentin (NEURONTIN) 800 MG tablet Take 1 tablet (800 mg total) by mouth 3 (three) times daily.    levetiracetam (KEPPRA) 750 MG Tab Take 1 tablet (750 mg total) by mouth 2 (two) times daily.    levothyroxine (SYNTHROID) 150 MCG tablet Take 150 mcg by mouth once daily.    memantine (NAMENDA) 10 MG Tab 1 BID (Patient taking differently: Take 10 mg by mouth 2 (two) times daily. )    multivitamin (ONE DAILY MULTIVITAMIN) per tablet Take 1 tablet by mouth once daily.    phenobarbital " (LUMINAL) 32.4 MG tablet Take 32.4 mg by mouth 3 (three) times daily.    senna-docusate 8.6-50 mg (PERICOLACE) 8.6-50 mg per tablet Take 1 tablet by mouth 2 (two) times daily as needed for Constipation.    tamsulosin (FLOMAX) 0.4 mg Cp24 Take 1 capsule (0.4 mg total) by mouth once daily.    zinc sulfate 220 mg Tab Take 1 tablet by mouth once daily.         Please add appropriate    SmartPhrase below:

## 2017-08-18 NOTE — PLAN OF CARE
"SW received call back from pt's daughter, Marta. She was tearful over the phone stating "He's just an old man, why doesn't anybody want him". Marta prefers that pt not return to [facility] because she claims while he was there they just kept him in the bed all day. Marta will do research over weekend for facility choices and will get back with SW on Monday.   "

## 2017-08-18 NOTE — ASSESSMENT & PLAN NOTE
- Was being treated for UTI during prior admission last week. Discharged on Keflex 500mg Q12 for 7 days.  - Admitted again on 08/16 for AMS and hematuria (was tugging on his catheter in the nursing home).  - UA at admission showed 6+ leukocytes, nitrite neg; No e/o UTI  - Continue Keflex. Last dose tomm.

## 2017-08-18 NOTE — PLAN OF CARE
Extended Emergency Contact Information  Primary Emergency Contact: Marta Sanches  Address: [address] [place] [address] [facility]  Mobile Phone: [phone]  Relation: Daughter    Luis A Milton MD  [address] [facility] [address]    Payor: HU[employer] MANAGED MEDICARE / Plan: [employer]NA[employer]US DIAB[employer]ES & HEART HMO SNP / Product Type: Medicare Advantage /       [facility] [id number] [place] [address] - [address] AT [facility] [address]  [facility] [address]  Phone: [phone] Fax: [phone]       08/18/17 1433   Discharge Assessment   Assessment Type Discharge Planning Assessment   Confirmed/corrected address and phone number on facesheet? Yes   Assessment information obtained from? Patient   Expected Length of Stay (days) 2   Communicated expected length of stay with patient/caregiver yes   Prior to hospitilization cognitive status: Not Oriented to Time;Not Oriented to Place   Prior to hospitalization functional status: Partially Dependent   Current cognitive status: Not Oriented to Place;Not Oriented to Time   Current Functional Status: Partially Dependent   Arrived From assisted living   Lives With facility resident   Able to Return to Prior Arrangements no   Is patient able to care for self after discharge? No   How many people do you have in your home that can help with your care after discharge? other (see comments)   Patient's perception of discharge disposition assisted living   Readmission Within The Last 30 Days previous discharge plan unsuccessful   Patient currently being followed by outpatient case management? No   Patient currently receives home health services? No   Does the patient currently use HME? Yes   Patient currently receives private duty nursing? N/A   Patient currently receives any other outside agency services? No   Equipment Currently Used at Home cane, straight;shower chair   Do you have any  problems affording any of your prescribed medications? No   Is the patient taking medications as prescribed? yes   Do you have any financial concerns preventing you from receiving the healthcare you need? No   Does the patient have transportation to healthcare appointments? Yes   Transportation Available family or friend will provide   On Dialysis? No   Does the patient receive services at the Coumadin Clinic? No   Are there any open cases? No   Discharge Plan A Skilled Nursing Facility   Discharge Plan B New Nursing Home placement - [facility] facility   Patient/Family In Agreement With Plan unable to assess

## 2017-08-18 NOTE — PLAN OF CARE
[facility]     Department of Hospital Medicine     [address] [place] [address]     [phone] after hours  [phone] fax       NURSING HOME ORDERS    08/18/2017    Admit to Nursing Home:  Skilled Bed                                         Diagnoses:  Active Hospital Problems    Diagnosis  POA    *Hematuria [R31.9]  Yes    Urinary retention [R33.9]  Yes     Priority: 2     Essential hypertension [I10]  Yes     Priority: 3     Partial symptomatic epilepsy with complex partial seizures, not intractable, without status epilepticus [G40.209]  Yes     Priority: 4     Vascular dementia without behavioral disturbance [F01.50]  Yes     Priority: 5     ESTHER (acute kidney injury) [N17.9]  Yes    Paroxysmal atrial fibrillation [I48.0]  Yes    Acquired hypothyroidism [E03.9]  Yes    Benign non-nodular prostatic hyperplasia without lower urinary tract symptoms [N40.0]  Yes      Resolved Hospital Problems    Diagnosis Date Resolved POA   No resolved problems to display.       Patient is homebound due to:  Hematuria, Archer in place, ESTHER    Allergies:  Review of patient's allergies indicates:   Allergen Reactions    Penicillins Hives     Tolerated ceftriaxone and cephalexin       Vitals: As per facility Protocol    Diet: Regular         Acitivities:  As tolerated.    LABS:  Per facility protocol    Nursing Precautions:     - Aspiration precautions:             - Total assistance with meals (give sips of water with food)            -  Upright 90 degrees befor during and after meals         - Fall precautions per nursing home protocol   - Seizure precaution per prison protocol   - Decubitus precautions:        -  for positioning   - Pressure reducing foam mattress   - Turn patient every two hours. Use wedge pillows to anchor patient    CONSULTS:     Physical Therapy to evaluate and treat     Occupational Therapy to evaluate and treat     Speech Therapy   to evaluate and treat     Nutrition to evaluate and recommend diet     Psychiatry to evaluate and follow patients for delirium    MISCELLANEOUS CARE:    Archer Care: Empty Archer bag every shift.  Change Archer every month     Routine Skin for Bedridden Patients:  Apply moisture barrier cream to all    skin folds and wet areas in perineal area daily and after baths and                           all bowel movements.                   Check blood sugar:      Fingerstick blood sugar a.m and p.m.        Report CBG < 60 or > 400 to physician.                                          Insulin Sliding Scale          Glucose  Novolog Insulin Subcutaneous        0 - 60   Orange juice or glucose tablet, hold insulin      No insulin   201-250  2 units   251-300  4 units   301-350  6 units   351-400  8 units   >400   10 units then call physician      Medications: Discontinue all previous medication orders, if any. See new list below.     Virgil Melo Jr.   Home Medication Instructions NEETA:[id number]    Printed on:08/18/17 1310   Medication Information                      acetaminophen (TYLENOL) 650 MG TbSR  Take 650 mg by mouth every 6 (six) hours as needed (mild to moderate pain).             apixaban 5 mg Tab  Take 2 tablets (10mg) by mouth twice daily for 5 more days with end date of 7/12/2017 then decrease dose to 1 tablet (5mg) my mouth twice daily on 7/13/2017 and continue indefinitely for treatment of PE and also for stroke prevention for atrial fibrillation.             atorvastatin (LIPITOR) 40 MG tablet  TAKE 1 TABLET BY MOUTH EVERY DAY             calcium carbonate (OS-ROSA) 500 mg calcium (1,250 mg) tablet  Take 2 tablets (1,000 mg total) by mouth once daily.             carvedilol (COREG) 25 MG tablet  Take 1 tablet (25 mg total) by mouth 2 (two) times daily.             cephALEXin (KEFLEX) 500 MG capsule  Take 1 capsule (500 mg total) by mouth every 12 (twelve) hours. (Last day of antibiotic on 08/19)              ergocalciferol (ERGOCALCIFEROL) 50,000 unit Cap  Take 1 capsule (50,000 Units total) by mouth Every Friday.             finasteride (PROSCAR) 5 mg tablet  Take 1 tablet (5 mg total) by mouth once daily.             gabapentin (NEURONTIN) 800 MG tablet  Take 1 tablet (800 mg total) by mouth 3 (three) times daily.             levetiracetam (KEPPRA) 750 MG Tab  Take 1 tablet (750 mg total) by mouth 2 (two) times daily.             levothyroxine (SYNTHROID) 150 MCG tablet  Take 150 mcg by mouth once daily.             memantine (NAMENDA) 10 MG Tab  1 BID             multivitamin (ONE DAILY MULTIVITAMIN) per tablet  Take 1 tablet by mouth once daily.             phenobarbital (LUMINAL) 32.4 MG tablet  Take 32.4 mg by mouth 3 (three) times daily.             quetiapine 50 mg oral tb24 (SEROQUEL XR) 50 mg Tb24  Take 1 tablet (50 mg total) by mouth once daily as needed (Agitation).             senna-docusate 8.6-50 mg (PERICOLACE) 8.6-50 mg per tablet  Take 1 tablet by mouth 2 (two) times daily as needed for Constipation.             tamsulosin (FLOMAX) 0.4 mg Cp24  Take 1 capsule (0.4 mg total) by mouth once daily.             zinc sulfate 220 mg Tab  Take 1 tablet by mouth once daily.                       _________________________________  Scott Ramos MD  08/18/2017

## 2017-08-19 NOTE — ASSESSMENT & PLAN NOTE
Patient presents with hematuria and ESTHER with Cr of 1.6  - DDx includes trauma, BPH, obstructive stone, UTI  - However his ESTHER could be pre-renal due to poor oral intake in the NH. Encourage oral fluid intake with meals   - He was previous admitted for for hematuria that resolved with fluids and barton placement.  Given 2 boluses of IVF in the ER.  - Cont Flomax and Finasteride.  - Hg 9.3, Eliquis restarted yesterday  - WBCs 3070, Vitals stable.   - Today is last day of Kefelx.

## 2017-08-19 NOTE — SUBJECTIVE & OBJECTIVE
Interval History: NAEO. Vitals stable.  Oriented to person and place.    Review of Systems   Constitutional: Negative for appetite change, chills and fever.   Eyes: Negative for visual disturbance.   Respiratory: Negative for cough and shortness of breath.    Cardiovascular: Positive for leg swelling. Negative for chest pain.   Gastrointestinal: Negative for abdominal pain, diarrhea, nausea and vomiting.   Skin: Negative for pallor.        No skin breakdown.   Psychiatric/Behavioral: Negative for agitation and behavioral problems.     Objective:     Vital Signs (Most Recent):  Temp: 97.4 °F (36.3 °C) (08/19/17 0038)  Pulse: 80 (08/19/17 0038)  Resp: 16 (08/19/17 0038)  BP: (!) 119/59 (08/19/17 0038)  SpO2: 96 % (08/19/17 0038) Vital Signs (24h Range):  Temp:  [97.4 °F (36.3 °C)-98.9 °F (37.2 °C)] 97.4 °F (36.3 °C)  Pulse:  [73-92] 80  Resp:  [16-18] 16  SpO2:  [93 %-98 %] 96 %  BP: (119-151)/(59-80) 119/59     Weight: 83 kg (182 lb 14.4 oz)  Body mass index is 24.13 kg/m².    Intake/Output Summary (Last 24 hours) at 08/19/17 0804  Last data filed at 08/19/17 0624   Gross per 24 hour   Intake              920 ml   Output              500 ml   Net              420 ml      Physical Exam   Constitutional: No distress.   Cardiovascular: Normal rate, regular rhythm, normal heart sounds and intact distal pulses.    Pulmonary/Chest: Effort normal and breath sounds normal.   Abdominal: Soft. Bowel sounds are normal.   Genitourinary:   Genitourinary Comments: No bleeding from urethra.   Neurological: He is alert.   Skin: Skin is warm.       Significant Labs:   A1C: No results for input(s): HGBA1C in the last 4320 hours.  ABGs: No results for input(s): PH, PCO2, HCO3, POCSATURATED, BE, TOTALHB, COHB, METHB, O2HB, POCFIO2 in the last 48 hours.  Bilirubin:   Recent Labs  Lab 08/09/17 0718 08/16/17 2139   BILITOT 0.3 0.3     Blood Culture: No results for input(s): LABBLOO in the last 48 hours.  BMP:   Recent Labs  Lab  08/18/17  0651   GLU 98      K 4.1   *   CO2 21*   BUN 14   CREATININE 1.1   CALCIUM 8.8     CBC:   Recent Labs  Lab 08/18/17  0651   WBC 8.57   HGB 10.4*   HCT 32.2*        CMP:   Recent Labs  Lab 08/18/17  0651      K 4.1   *   CO2 21*   GLU 98   BUN 14   CREATININE 1.1   CALCIUM 8.8   ANIONGAP 9   EGFRNONAA 59.6*     Cardiac Markers: No results for input(s): CKMB, MYOGLOBIN, BNP, TROPISTAT in the last 48 hours.  Coagulation: No results for input(s): INR, APTT in the last 48 hours.    Invalid input(s): PT  Lactic Acid: No results for input(s): LACTATE in the last 48 hours.  Lipase: No results for input(s): LIPASE in the last 48 hours.  Lipid Panel: No results for input(s): CHOL, HDL, LDLCALC, TRIG, CHOLHDL in the last 48 hours.  Magnesium: No results for input(s): MG in the last 48 hours.  Pathology Results  (Last 10 years)               06/28/12 0000  Tissue Specimen to Pathology Final result        POCT Glucose: No results for input(s): POCTGLUCOSE in the last 48 hours.  Prealbumin: No results for input(s): PREALBUMIN in the last 48 hours.  Respiratory Culture: No results for input(s): GSRESP, RESPIRATORYC in the last 48 hours.  Troponin: No results for input(s): TROPONINI in the last 48 hours.  TSH:   Recent Labs  Lab 08/09/17  0342   TSH 1.419     Urine Culture: No results for input(s): LABURIN in the last 48 hours.  Urine Studies: No results for input(s): COLORU, APPEARANCEUA, PHUR, SPECGRAV, PROTEINUA, GLUCUA, KETONESU, BILIRUBINUA, OCCULTUA, NITRITE, UROBILINOGEN, LEUKOCYTESUR, RBCUA, WBCUA, BACTERIA, SQUAMEPITHEL, HYALINECASTS in the last 48 hours.    Invalid input(s): WRIGHTSUR  All pertinent labs within the past 24 hours have been reviewed.    Significant Imaging: I have reviewed all pertinent imaging results/findings within the past 24 hours.

## 2017-08-19 NOTE — PLAN OF CARE
Problem: Patient Care Overview  Goal: Plan of Care Review  Outcome: Ongoing (interventions implemented as appropriate)  POC reviewed with pt. AAO x1.  Pt remained free from falls. Questions and concerns addressed. Pt progressing towards goals. Will continue to monitor. Pt complained of buttocks hurting, he received a wedge and a blue cushion to relieve pressure. See flow sheets for full assessment and VS

## 2017-08-19 NOTE — ASSESSMENT & PLAN NOTE
- Was being treated for UTI during prior admission last week. Discharged on Keflex 500mg Q12 for 7 days.  - Admitted again on 08/16 for AMS and hematuria (was tugging on his catheter in the nursing home).  - UA at admission showed 6+ leukocytes, nitrite neg; No e/o UTI  - Continue Keflex. Last dose today

## 2017-08-19 NOTE — PROGRESS NOTES
[facility] Hospital Medicine  Progress Note    Patient Name: Virgil Melo Jr.  MRN: [id number]  Patient Class: OP- Observation   Admission Date: 8/16/2017  Length of Stay: 0 days  Attending Physician: Tyler Adan MD  Primary Care Provider: Luis A Milton MD    [facility] Medicine Team: [facility] 1 Scott Ramos MD    Subjective:     Principal Problem:ESTHER (acute kidney injury)    HPI:  Mr Melo is an 89 y/o M with a history of epilepsy, vascular dementia (on memantine), Afib and pulmonary embolism (on eliquis), recent hip fracture with surgical repair on 7/3, recent UTI w/ bactrim, and a recent admission for altered mental status presents to the ED with hematuria.    Patient had barton placed during last stay (8/9 to 8/14) for obstructive uropathy and per daughter, nursing staff noted that he was handling barton frequently.  Has not had long-term barton before.  Today staff noticed gross blood in barton bag, sent to ED.  Patient unable to provide history. Daughter reported patient is at baseline.  Reports combative with staff during insertion of barton.    On his previous admission, patient was noted to have suprapubic tenderness and palpable bladder, with additional concern for UTI. UTI was treated with ceftriaxone. Barton helped to resolve retention with 1675 cc's drained. Noted to have concurrent ESTHER with 1.3 thought to be obstructive in etiology.     I have obtained history from ED records. Patient is altered.     Hospital Course:  Upon ED admission, bolused with 1 L fluids, Cefepime and Vancomycin.     08/18: NAEO. Vitals stable.  Oriented only to person. No further bleeding from urethra, Barton clear.    08/19: NAEO. Vitals stable.  Oriented to person and place. No further bleeding.    Interval History: NAEO. Vitals stable.  Oriented to person and place.    Review of Systems   Constitutional: Negative for appetite change, chills and fever.   Eyes: Negative for visual  disturbance.   Respiratory: Negative for cough and shortness of breath.    Cardiovascular: Positive for leg swelling. Negative for chest pain.   Gastrointestinal: Negative for abdominal pain, diarrhea, nausea and vomiting.   Skin: Negative for pallor.        No skin breakdown.   Psychiatric/Behavioral: Negative for agitation and behavioral problems.     Objective:     Vital Signs (Most Recent):  Temp: 97.4 °F (36.3 °C) (08/19/17 0038)  Pulse: 80 (08/19/17 0038)  Resp: 16 (08/19/17 0038)  BP: (!) 119/59 (08/19/17 0038)  SpO2: 96 % (08/19/17 0038) Vital Signs (24h Range):  Temp:  [97.4 °F (36.3 °C)-98.9 °F (37.2 °C)] 97.4 °F (36.3 °C)  Pulse:  [73-92] 80  Resp:  [16-18] 16  SpO2:  [93 %-98 %] 96 %  BP: (119-151)/(59-80) 119/59     Weight: 83 kg (182 lb 14.4 oz)  Body mass index is 24.13 kg/m².    Intake/Output Summary (Last 24 hours) at 08/19/17 0804  Last data filed at 08/19/17 0624   Gross per 24 hour   Intake              920 ml   Output              500 ml   Net              420 ml      Physical Exam   Constitutional: No distress.   Cardiovascular: Normal rate, regular rhythm, normal heart sounds and intact distal pulses.    Pulmonary/Chest: Effort normal and breath sounds normal.   Abdominal: Soft. Bowel sounds are normal.   Genitourinary:   Genitourinary Comments: No bleeding from urethra.   Neurological: He is alert.   Skin: Skin is warm.       Significant Labs:   A1C: No results for input(s): HGBA1C in the last 4320 hours.  ABGs: No results for input(s): PH, PCO2, HCO3, POCSATURATED, BE, TOTALHB, COHB, METHB, O2HB, POCFIO2 in the last 48 hours.  Bilirubin:   Recent Labs  Lab 08/09/17  0718 08/16/17  2139   BILITOT 0.3 0.3     Blood Culture: No results for input(s): LABBLOO in the last 48 hours.  BMP:   Recent Labs  Lab 08/18/17  0651   GLU 98      K 4.1   *   CO2 21*   BUN 14   CREATININE 1.1   CALCIUM 8.8     CBC:   Recent Labs  Lab 08/18/17  0651   WBC 8.57   HGB 10.4*   HCT 32.2*         CMP:   Recent Labs  Lab 08/18/17  0651      K 4.1   *   CO2 21*   GLU 98   BUN 14   CREATININE 1.1   CALCIUM 8.8   ANIONGAP 9   EGFRNONAA 59.6*     Cardiac Markers: No results for input(s): CKMB, MYOGLOBIN, BNP, TROPISTAT in the last 48 hours.  Coagulation: No results for input(s): INR, APTT in the last 48 hours.    Invalid input(s): PT  Lactic Acid: No results for input(s): LACTATE in the last 48 hours.  Lipase: No results for input(s): LIPASE in the last 48 hours.  Lipid Panel: No results for input(s): CHOL, HDL, LDLCALC, TRIG, CHOLHDL in the last 48 hours.  Magnesium: No results for input(s): MG in the last 48 hours.  Pathology Results  (Last 10 years)               06/28/12 0000  Tissue Specimen to Pathology Final result        POCT Glucose: No results for input(s): POCTGLUCOSE in the last 48 hours.  Prealbumin: No results for input(s): PREALBUMIN in the last 48 hours.  Respiratory Culture: No results for input(s): GSRESP, RESPIRATORYC in the last 48 hours.  Troponin: No results for input(s): TROPONINI in the last 48 hours.  TSH:   Recent Labs  Lab 08/09/17  0342   TSH 1.419     Urine Culture: No results for input(s): LABURIN in the last 48 hours.  Urine Studies: No results for input(s): COLORU, APPEARANCEUA, PHUR, SPECGRAV, PROTEINUA, GLUCUA, KETONESU, BILIRUBINUA, OCCULTUA, NITRITE, UROBILINOGEN, LEUKOCYTESUR, RBCUA, WBCUA, BACTERIA, SQUAMEPITHEL, HYALINECASTS in the last 48 hours.    Invalid input(s): WRIGHTSUR  All pertinent labs within the past 24 hours have been reviewed.    Significant Imaging: I have reviewed all pertinent imaging results/findings within the past 24 hours.    Assessment/Plan:      * ESTHER (acute kidney injury)    Patient presents with hematuria and ESTHER with Cr of 1.6  - DDx includes trauma, BPH, obstructive stone, UTI  - However his ESTHER could be pre-renal due to poor oral intake in the NH. Encourage oral fluid intake with meals (12 ounces with meals)  - He was previous  admitted for for hematuria that resolved with fluids and barton placement.  Given 2 boluses of IVF in the ER.  - Cont Flomax and Finasteride.  - Hg 9.3, Eliquis restarted yesterday  - WBCs 3070, Vitals stable.   - Today is last day of Kefelx.    Pending placement in a nursing facility.          Complicated UTI (urinary tract infection)    - Was being treated for UTI during prior admission last week. Discharged on Keflex 500mg Q12 for 7 days.  - Admitted again on 08/16 for AMS and hematuria (was tugging on his catheter in the nursing home).  - UA at admission showed 6+ leukocytes, nitrite neg; No e/o UTI  - Continue Keflex. Last dose today          Paroxysmal atrial fibrillation    FL 80, irregular rhythm  Apixaban restarted yesterday        Urinary retention    - Barton's in place for urinary retention.  - Barton care  - Cont Flomax and Finasteride        Essential hypertension    Continue coreg 12.5mg BID.        Vascular dementia without behavioral disturbance    Continue mematine  Seroquel 50mg once daily as needed.          Partial symptomatic epilepsy with complex partial seizures, not intractable, without status epilepticus    Continue Keppra and phenobarbitol        Acquired hypothyroidism    Continue levothyroxine          VTE Risk Mitigation         Ordered     apixaban tablet 5 mg  2 times daily     Route:  Oral        08/18/17 1106              Scott Ramos MD  Department of Hospital Medicine   [facility]

## 2017-08-20 NOTE — PROGRESS NOTES
[facility] Hospital Medicine  Progress Note    Patient Name: Virgil Melo Jr.  MRN: [id number]  Patient Class: OP- Observation   Admission Date: 8/16/2017  Length of Stay: 0 days  Attending Physician: Tyler Adan MD  Primary Care Provider: Luis A Milton MD    [facility] Medicine Team: [facility]P MED 1 Scott Ramos MD    Subjective:     Principal Problem:ESTHER (acute kidney injury)    HPI:  Mr Melo is an 89 y/o M with a history of epilepsy, vascular dementia (on memantine), Afib and pulmonary embolism (on eliquis), recent hip fracture with surgical repair on 7/3, recent UTI w/ bactrim, and a recent admission for altered mental status presents to the ED with hematuria.    Patient had barton placed during last stay (8/9 to 8/14) for obstructive uropathy and per daughter, nursing staff noted that he was handling barton frequently.  Has not had long-term barton before.  Today staff noticed gross blood in barton bag, sent to ED.  Patient unable to provide history. Daughter reported patient is at baseline.  Reports combative with staff during insertion of barton.    On his previous admission, patient was noted to have suprapubic tenderness and palpable bladder, with additional concern for UTI. UTI was treated with ceftriaxone. Barton helped to resolve retention with 1675 cc's drained. Noted to have concurrent ESTHER with 1.3 thought to be obstructive in etiology.     I have obtained history from ED records. Patient is altered.     Hospital Course:  Upon ED admission, bolused with 1 L fluids, Cefepime and Vancomycin.     08/18: NAEO. Vitals stable.  Oriented only to person. No further bleeding from urethra, Barton clear.    08/19: NAEO. Vitals stable.  Oriented to person and place. No further bleeding.    08/20: NAEO. Vitals stable.  Oriented x3. Pending discharge tomm.     Interval History:NAEO. Doing well.  Vitals stable.      Review of Systems   Constitutional: Negative for chills and fever.    Respiratory: Negative for cough, shortness of breath and stridor.    Cardiovascular: Negative for chest pain and palpitations.   Gastrointestinal: Negative for abdominal pain, constipation, diarrhea, nausea and vomiting.   Genitourinary: Negative for difficulty urinating and urgency.   Neurological: Negative for dizziness, weakness and light-headedness.   Psychiatric/Behavioral: Negative for agitation, behavioral problems and confusion.     Objective:     Vital Signs (Most Recent):  Temp: 98.9 °F (37.2 °C) (08/20/17 1200)  Pulse: 77 (08/20/17 1200)  Resp: 17 (08/20/17 1200)  BP: (!) 141/80 (08/20/17 1200)  SpO2: (!) 92 % (08/20/17 1200) Vital Signs (24h Range):  Temp:  [97.8 °F (36.6 °C)-99.5 °F (37.5 °C)] 98.9 °F (37.2 °C)  Pulse:  [71-77] 77  Resp:  [16-20] 17  SpO2:  [92 %-95 %] 92 %  BP: (133-166)/(65-87) 141/80     Weight: 83 kg (182 lb 14.4 oz)  Body mass index is 24.13 kg/m².    Intake/Output Summary (Last 24 hours) at 08/20/17 1544  Last data filed at 08/19/17 2109   Gross per 24 hour   Intake              120 ml   Output                0 ml   Net              120 ml      Physical Exam   Constitutional: He is oriented to person, place, and time. No distress.   Cardiovascular: Normal rate, regular rhythm, normal heart sounds and intact distal pulses.    Pulmonary/Chest: Effort normal and breath sounds normal.   Abdominal: Soft. Bowel sounds are normal.   Neurological: He is alert and oriented to person, place, and time.   Nursing note and vitals reviewed.      Significant Labs:   A1C: No results for input(s): HGBA1C in the last 4320 hours.  ABGs: No results for input(s): PH, PCO2, HCO3, POCSATURATED, BE, TOTALHB, COHB, METHB, O2HB, POCFIO2 in the last 48 hours.  Bilirubin:   Recent Labs  Lab 08/09/17  0718 08/16/17  2139 08/20/17  0936   BILITOT 0.3 0.3 0.3     Blood Culture: No results for input(s): LABBLOO in the last 48 hours.  BMP:   Recent Labs  Lab 08/20/17  0936   *      K 3.7   *    CO2 22*   BUN 12   CREATININE 1.1   CALCIUM 8.9     CBC: No results for input(s): WBC, HGB, HCT, PLT in the last 48 hours.  CMP:   Recent Labs  Lab 08/20/17  0936      K 3.7   *   CO2 22*   *   BUN 12   CREATININE 1.1   CALCIUM 8.9   PROT 6.7   ALBUMIN 2.8*   BILITOT 0.3   ALKPHOS 96   AST 17   ALT 15   ANIONGAP 10   EGFRNONAA 59.6*     Cardiac Markers: No results for input(s): CKMB, MYOGLOBIN, BNP, TROPISTAT in the last 48 hours.  Coagulation: No results for input(s): INR, APTT in the last 48 hours.    Invalid input(s): PT  Lactic Acid: No results for input(s): LACTATE in the last 48 hours.  Lipase: No results for input(s): LIPASE in the last 48 hours.  Lipid Panel: No results for input(s): CHOL, HDL, LDLCALC, TRIG, CHOLHDL in the last 48 hours.  Magnesium: No results for input(s): MG in the last 48 hours.  Pathology Results  (Last 10 years)               06/28/12 0000  Tissue Specimen to Pathology Final result        POCT Glucose: No results for input(s): POCTGLUCOSE in the last 48 hours.  Prealbumin: No results for input(s): PREALBUMIN in the last 48 hours.  Respiratory Culture: No results for input(s): GSRESP, RESPIRATORYC in the last 48 hours.  Troponin: No results for input(s): TROPONINI in the last 48 hours.  TSH:   Recent Labs  Lab 08/09/17  0342   TSH 1.419     Urine Culture: No results for input(s): LABURIN in the last 48 hours.  Urine Studies: No results for input(s): COLORU, APPEARANCEUA, PHUR, SPECGRAV, PROTEINUA, GLUCUA, KETONESU, BILIRUBINUA, OCCULTUA, NITRITE, UROBILINOGEN, LEUKOCYTESUR, RBCUA, WBCUA, BACTERIA, SQUAMEPITHEL, HYALINECASTS in the last 48 hours.    Invalid input(s): WRIGHTSUR  All pertinent labs within the past 24 hours have been reviewed.    Significant Imaging: I have reviewed all pertinent imaging results/findings within the past 24 hours.    Assessment/Plan:      * ESTHER (acute kidney injury)    Patient presents with hematuria and ESTHER with Cr of 1.6  - DDx  includes trauma, BPH, obstructive stone, UTI  - However his ESTHER could be pre-renal due to poor oral intake in the NH. Encourage oral fluid intake with meals   - He was previous admitted for for hematuria that resolved with fluids and barton placement.  Given 2 boluses of IVF in the ER.  - Cont Flomax and Finasteride.  - WBCs 8570, Vitals stable.   Pending discharge tomorrow.           Complicated UTI (urinary tract infection)    - Was being treated for UTI during prior admission last week. Discharged on Keflex 500mg Q12 for 7 days.  - Admitted again on 08/16 for AMS and hematuria (was tugging on his catheter in the nursing home).  - UA at admission showed 6+ leukocytes, nitrite neg; No e/o UTI  Infection has responded to antibiotics.  Pending discharge tomorrow.          Paroxysmal atrial fibrillation    NC 80, irregular rhythm  Apixaban restarted        Urinary retention    - Barton's in place for urinary retention.  - Barton care  - Cont Flomax and Finasteride        Essential hypertension    Continue coreg 12.5mg BID.        Vascular dementia without behavioral disturbance    Continue mematine  Seroquel 50mg once daily as needed.          Partial symptomatic epilepsy with complex partial seizures, not intractable, without status epilepticus    Continue Keppra and phenobarbitol        Acquired hypothyroidism    Continue levothyroxine          VTE Risk Mitigation         Ordered     apixaban tablet 5 mg  2 times daily     Route:  Oral        08/18/17 1106              Scott Ramos MD  Department of Hospital Medicine   [facility]

## 2017-08-20 NOTE — ASSESSMENT & PLAN NOTE
- Was being treated for UTI during prior admission last week. Discharged on Keflex 500mg Q12 for 7 days.  - Admitted again on 08/16 for AMS and hematuria (was tugging on his catheter in the nursing home).  - UA at admission showed 6+ leukocytes, nitrite neg; No e/o UTI  Infection has responded to antibiotics.  Pending discharge tomorrow.

## 2017-08-20 NOTE — SUBJECTIVE & OBJECTIVE
Interval History:NAEO. Doing well.  Vitals stable.      Review of Systems   Constitutional: Negative for chills and fever.   Respiratory: Negative for cough, shortness of breath and stridor.    Cardiovascular: Negative for chest pain and palpitations.   Gastrointestinal: Negative for abdominal pain, constipation, diarrhea, nausea and vomiting.   Genitourinary: Negative for difficulty urinating and urgency.   Neurological: Negative for dizziness, weakness and light-headedness.   Psychiatric/Behavioral: Negative for agitation, behavioral problems and confusion.     Objective:     Vital Signs (Most Recent):  Temp: 98.9 °F (37.2 °C) (08/20/17 1200)  Pulse: 77 (08/20/17 1200)  Resp: 17 (08/20/17 1200)  BP: (!) 141/80 (08/20/17 1200)  SpO2: (!) 92 % (08/20/17 1200) Vital Signs (24h Range):  Temp:  [97.8 °F (36.6 °C)-99.5 °F (37.5 °C)] 98.9 °F (37.2 °C)  Pulse:  [71-77] 77  Resp:  [16-20] 17  SpO2:  [92 %-95 %] 92 %  BP: (133-166)/(65-87) 141/80     Weight: 83 kg (182 lb 14.4 oz)  Body mass index is 24.13 kg/m².    Intake/Output Summary (Last 24 hours) at 08/20/17 1544  Last data filed at 08/19/17 2109   Gross per 24 hour   Intake              120 ml   Output                0 ml   Net              120 ml      Physical Exam   Constitutional: He is oriented to person, place, and time. No distress.   Cardiovascular: Normal rate, regular rhythm, normal heart sounds and intact distal pulses.    Pulmonary/Chest: Effort normal and breath sounds normal.   Abdominal: Soft. Bowel sounds are normal.   Neurological: He is alert and oriented to person, place, and time.   Nursing note and vitals reviewed.      Significant Labs:   A1C: No results for input(s): HGBA1C in the last 4320 hours.  ABGs: No results for input(s): PH, PCO2, HCO3, POCSATURATED, BE, TOTALHB, COHB, METHB, O2HB, POCFIO2 in the last 48 hours.  Bilirubin:   Recent Labs  Lab 08/09/17  0718 08/16/17  2139 08/20/17  0936   BILITOT 0.3 0.3 0.3     Blood Culture: No results  for input(s): LABBLOO in the last 48 hours.  BMP:   Recent Labs  Lab 08/20/17  0936   *      K 3.7   *   CO2 22*   BUN 12   CREATININE 1.1   CALCIUM 8.9     CBC: No results for input(s): WBC, HGB, HCT, PLT in the last 48 hours.  CMP:   Recent Labs  Lab 08/20/17  0936      K 3.7   *   CO2 22*   *   BUN 12   CREATININE 1.1   CALCIUM 8.9   PROT 6.7   ALBUMIN 2.8*   BILITOT 0.3   ALKPHOS 96   AST 17   ALT 15   ANIONGAP 10   EGFRNONAA 59.6*     Cardiac Markers: No results for input(s): CKMB, MYOGLOBIN, BNP, TROPISTAT in the last 48 hours.  Coagulation: No results for input(s): INR, APTT in the last 48 hours.    Invalid input(s): PT  Lactic Acid: No results for input(s): LACTATE in the last 48 hours.  Lipase: No results for input(s): LIPASE in the last 48 hours.  Lipid Panel: No results for input(s): CHOL, HDL, LDLCALC, TRIG, CHOLHDL in the last 48 hours.  Magnesium: No results for input(s): MG in the last 48 hours.  Pathology Results  (Last 10 years)               06/28/12 0000  Tissue Specimen to Pathology Final result        POCT Glucose: No results for input(s): POCTGLUCOSE in the last 48 hours.  Prealbumin: No results for input(s): PREALBUMIN in the last 48 hours.  Respiratory Culture: No results for input(s): GSRESP, RESPIRATORYC in the last 48 hours.  Troponin: No results for input(s): TROPONINI in the last 48 hours.  TSH:   Recent Labs  Lab 08/09/17  0342   TSH 1.419     Urine Culture: No results for input(s): LABURIN in the last 48 hours.  Urine Studies: No results for input(s): COLORU, APPEARANCEUA, PHUR, SPECGRAV, PROTEINUA, GLUCUA, KETONESU, BILIRUBINUA, OCCULTUA, NITRITE, UROBILINOGEN, LEUKOCYTESUR, RBCUA, WBCUA, BACTERIA, SQUAMEPITHEL, HYALINECASTS in the last 48 hours.    Invalid input(s): WRIGHTSUR  All pertinent labs within the past 24 hours have been reviewed.    Significant Imaging: I have reviewed all pertinent imaging results/findings within the past 24 hours.

## 2017-08-20 NOTE — ASSESSMENT & PLAN NOTE
Patient presents with hematuria and ESTHER with Cr of 1.6  - DDx includes trauma, BPH, obstructive stone, UTI  - However his ESTHER could be pre-renal due to poor oral intake in the NH. Encourage oral fluid intake with meals   - He was previous admitted for for hematuria that resolved with fluids and barton placement.  Given 2 boluses of IVF in the ER.  - Cont Flomax and Finasteride.  - WBCs 8570, Vitals stable.   Pending discharge tomorrow.

## 2017-08-21 NOTE — ASSESSMENT & PLAN NOTE
Patient presents with hematuria and ESTHER with Cr of 1.6  - DDx includes trauma, BPH, obstructive stone, UTI  - However his ESTHER could be pre-renal due to poor oral intake in the NH. Encourage oral fluid intake with meals   - He was previous admitted for for hematuria that resolved with fluids and barton placement.  Given 2 boluses of IVF in the ER.  - Cont Flomax and Finasteride.  -  Vitals stable. Afebrile  Anticipating discharge on 08/23.

## 2017-08-21 NOTE — SUBJECTIVE & OBJECTIVE
Interval History: NAEO. Doing well. Vitals stable.  Pending placement.    Review of Systems   Constitutional: Negative for chills and fever.   Respiratory: Negative for cough, chest tightness and shortness of breath.    Cardiovascular: Negative for chest pain.   Gastrointestinal: Negative for abdominal pain, constipation, diarrhea, nausea and vomiting.   Genitourinary: Negative for difficulty urinating and urgency.   Musculoskeletal: Negative for arthralgias.   Skin: Negative for color change.   Neurological: Negative for dizziness and light-headedness.   Psychiatric/Behavioral: Negative for agitation and behavioral problems.     Objective:     Vital Signs (Most Recent):  Temp: 97.7 °F (36.5 °C) (08/21/17 0739)  Pulse: 65 (08/21/17 0739)  Resp: 16 (08/21/17 0739)  BP: (!) 169/79 (08/21/17 0739)  SpO2: (!) 91 % (08/21/17 0739) Vital Signs (24h Range):  Temp:  [97.7 °F (36.5 °C)-99.7 °F (37.6 °C)] 97.7 °F (36.5 °C)  Pulse:  [65-88] 65  Resp:  [16] 16  SpO2:  [91 %-93 %] 91 %  BP: (136-169)/(79-92) 169/79     Weight: 83 kg (182 lb 14.4 oz)  Body mass index is 24.13 kg/m².    Intake/Output Summary (Last 24 hours) at 08/21/17 1347  Last data filed at 08/21/17 0611   Gross per 24 hour   Intake              240 ml   Output              700 ml   Net             -460 ml      Physical Exam   Constitutional: He is oriented to person, place, and time.   Cardiovascular: Normal rate, regular rhythm, normal heart sounds and intact distal pulses.    Pulmonary/Chest: Effort normal and breath sounds normal.   Abdominal: Soft. Bowel sounds are normal.   Neurological: He is alert and oriented to person, place, and time.   Skin: Skin is warm.   Nursing note and vitals reviewed.      Significant Labs:   A1C: No results for input(s): HGBA1C in the last 4320 hours.  ABGs: No results for input(s): PH, PCO2, HCO3, POCSATURATED, BE, TOTALHB, COHB, METHB, O2HB, POCFIO2 in the last 48 hours.  Bilirubin:   Recent Labs  Lab 08/09/17  0718  08/16/17  2139 08/20/17  0936   BILITOT 0.3 0.3 0.3     Blood Culture: No results for input(s): LABBLOO in the last 48 hours.  BMP:   Recent Labs  Lab 08/20/17  0936   *      K 3.7   *   CO2 22*   BUN 12   CREATININE 1.1   CALCIUM 8.9     CBC: No results for input(s): WBC, HGB, HCT, PLT in the last 48 hours.  CMP:   Recent Labs  Lab 08/20/17  0936      K 3.7   *   CO2 22*   *   BUN 12   CREATININE 1.1   CALCIUM 8.9   PROT 6.7   ALBUMIN 2.8*   BILITOT 0.3   ALKPHOS 96   AST 17   ALT 15   ANIONGAP 10   EGFRNONAA 59.6*     Cardiac Markers: No results for input(s): CKMB, MYOGLOBIN, BNP, TROPISTAT in the last 48 hours.  Coagulation: No results for input(s): INR, APTT in the last 48 hours.    Invalid input(s): PT  Lactic Acid: No results for input(s): LACTATE in the last 48 hours.  Lipase: No results for input(s): LIPASE in the last 48 hours.  Lipid Panel: No results for input(s): CHOL, HDL, LDLCALC, TRIG, CHOLHDL in the last 48 hours.  Magnesium: No results for input(s): MG in the last 48 hours.  Pathology Results  (Last 10 years)               06/28/12 0000  Tissue Specimen to Pathology Final result        POCT Glucose: No results for input(s): POCTGLUCOSE in the last 48 hours.  Prealbumin: No results for input(s): PREALBUMIN in the last 48 hours.  Respiratory Culture: No results for input(s): GSRESP, RESPIRATORYC in the last 48 hours.  Troponin: No results for input(s): TROPONINI in the last 48 hours.  TSH:   Recent Labs  Lab 08/09/17  0342   TSH 1.419     Urine Culture: No results for input(s): LABURIN in the last 48 hours.  Urine Studies: No results for input(s): COLORU, APPEARANCEUA, PHUR, SPECGRAV, PROTEINUA, GLUCUA, KETONESU, BILIRUBINUA, OCCULTUA, NITRITE, UROBILINOGEN, LEUKOCYTESUR, RBCUA, WBCUA, BACTERIA, SQUAMEPITHEL, HYALINECASTS in the last 48 hours.    Invalid input(s): MAGGY  All pertinent labs within the past 24 hours have been reviewed.    Significant Imaging: I  have reviewed all pertinent imaging results/findings within the past 24 hours.

## 2017-08-21 NOTE — PLAN OF CARE
Problem: Patient Care Overview  Goal: Plan of Care Review  Outcome: Ongoing (interventions implemented as appropriate)  VSS, diminished appetite but will eat w encouragement. Urine output minimally acceptable for this shift. Awaiting Call back  From Md. Seriously at risk for falls, camera in use, he needs frequent reminders.Skin intact, barrier paste being used.

## 2017-08-21 NOTE — PROGRESS NOTES
[facility] Hospital Medicine  Progress Note    Patient Name: Virgil Melo Jr.  MRN: [id number]  Patient Class: OP- Observation   Admission Date: 8/16/2017  Length of Stay: 0 days  Attending Physician: Tyler Adan MD  Primary Care Provider: Luis A Milton MD    [facility] Medicine Team: [facility] [place] [place] HOSP MED 1 Scott Ramos MD    Subjective:     Principal Problem:ESTHER (acute kidney injury)    HPI:  Mr Melo is an 89 y/o M with a history of epilepsy, vascular dementia (on memantine), Afib and pulmonary embolism (on eliquis), recent hip fracture with surgical repair on 7/3, recent UTI w/ bactrim, and a recent admission for altered mental status presents to the ED with hematuria.    Patient had barton placed during last stay (8/9 to 8/14) for obstructive uropathy and per daughter, nursing staff noted that he was handling barton frequently.  Has not had long-term barton before.  Today staff noticed gross blood in barton bag, sent to ED.  Patient unable to provide history. Daughter reported patient is at baseline.  Reports combative with staff during insertion of barton.    On his previous admission, patient was noted to have suprapubic tenderness and palpable bladder, with additional concern for UTI. UTI was treated with ceftriaxone. Barton helped to resolve retention with 1675 cc's drained. Noted to have concurrent ESTHER with 1.3 thought to be obstructive in etiology.     I have obtained history from ED records. Patient is altered.     Hospital Course:  Upon ED admission, bolused with 1 L fluids, Cefepime and Vancomycin.     08/18: NAEO. Vitals stable.  Oriented only to person. No further bleeding from urethra, Barton clear.    08/19: NAEO. Vitals stable.  Oriented to person and place. No further bleeding.    08/20: NAEO. Vitals stable.  Oriented x3. Pending discharge tomm.     08/21: NAEO. Vitals stable.  Oriented x3. Pending discharge on 08/23.    Interval History: NAEO. Doing well. Vitals  stable.  Pending placement.    Review of Systems   Constitutional: Negative for chills and fever.   Respiratory: Negative for cough, chest tightness and shortness of breath.    Cardiovascular: Negative for chest pain.   Gastrointestinal: Negative for abdominal pain, constipation, diarrhea, nausea and vomiting.   Genitourinary: Negative for difficulty urinating and urgency.   Musculoskeletal: Negative for arthralgias.   Skin: Negative for color change.   Neurological: Negative for dizziness and light-headedness.   Psychiatric/Behavioral: Negative for agitation and behavioral problems.     Objective:     Vital Signs (Most Recent):  Temp: 97.7 °F (36.5 °C) (08/21/17 0739)  Pulse: 65 (08/21/17 0739)  Resp: 16 (08/21/17 0739)  BP: (!) 169/79 (08/21/17 0739)  SpO2: (!) 91 % (08/21/17 0739) Vital Signs (24h Range):  Temp:  [97.7 °F (36.5 °C)-99.7 °F (37.6 °C)] 97.7 °F (36.5 °C)  Pulse:  [65-88] 65  Resp:  [16] 16  SpO2:  [91 %-93 %] 91 %  BP: (136-169)/(79-92) 169/79     Weight: 83 kg (182 lb 14.4 oz)  Body mass index is 24.13 kg/m².    Intake/Output Summary (Last 24 hours) at 08/21/17 1347  Last data filed at 08/21/17 0611   Gross per 24 hour   Intake              240 ml   Output              700 ml   Net             -460 ml      Physical Exam   Constitutional: He is oriented to person, place, and time.   Cardiovascular: Normal rate, regular rhythm, normal heart sounds and intact distal pulses.    Pulmonary/Chest: Effort normal and breath sounds normal.   Abdominal: Soft. Bowel sounds are normal.   Neurological: He is alert and oriented to person, place, and time.   Skin: Skin is warm.   Nursing note and vitals reviewed.      Significant Labs:   A1C: No results for input(s): HGBA1C in the last 4320 hours.  ABGs: No results for input(s): PH, PCO2, HCO3, POCSATURATED, BE, TOTALHB, COHB, METHB, O2HB, POCFIO2 in the last 48 hours.  Bilirubin:   Recent Labs  Lab 08/09/17  0718 08/16/17  2139 08/20/17  0936   BILITOT 0.3 0.3  0.3     Blood Culture: No results for input(s): LABBLOO in the last 48 hours.  BMP:   Recent Labs  Lab 08/20/17  0936   *      K 3.7   *   CO2 22*   BUN 12   CREATININE 1.1   CALCIUM 8.9     CBC: No results for input(s): WBC, HGB, HCT, PLT in the last 48 hours.  CMP:   Recent Labs  Lab 08/20/17  0936      K 3.7   *   CO2 22*   *   BUN 12   CREATININE 1.1   CALCIUM 8.9   PROT 6.7   ALBUMIN 2.8*   BILITOT 0.3   ALKPHOS 96   AST 17   ALT 15   ANIONGAP 10   EGFRNONAA 59.6*     Cardiac Markers: No results for input(s): CKMB, MYOGLOBIN, BNP, TROPISTAT in the last 48 hours.  Coagulation: No results for input(s): INR, APTT in the last 48 hours.    Invalid input(s): PT  Lactic Acid: No results for input(s): LACTATE in the last 48 hours.  Lipase: No results for input(s): LIPASE in the last 48 hours.  Lipid Panel: No results for input(s): CHOL, HDL, LDLCALC, TRIG, CHOLHDL in the last 48 hours.  Magnesium: No results for input(s): MG in the last 48 hours.  Pathology Results  (Last 10 years)               06/28/12 0000  Tissue Specimen to Pathology Final result        POCT Glucose: No results for input(s): POCTGLUCOSE in the last 48 hours.  Prealbumin: No results for input(s): PREALBUMIN in the last 48 hours.  Respiratory Culture: No results for input(s): GSRESP, RESPIRATORYC in the last 48 hours.  Troponin: No results for input(s): TROPONINI in the last 48 hours.  TSH:   Recent Labs  Lab 08/09/17  0342   TSH 1.419     Urine Culture: No results for input(s): LABURIN in the last 48 hours.  Urine Studies: No results for input(s): COLORU, APPEARANCEUA, PHUR, SPECGRAV, PROTEINUA, GLUCUA, KETONESU, BILIRUBINUA, OCCULTUA, NITRITE, UROBILINOGEN, LEUKOCYTESUR, RBCUA, WBCUA, BACTERIA, SQUAMEPITHEL, HYALINECASTS in the last 48 hours.    Invalid input(s): WRIGHTSUR  All pertinent labs within the past 24 hours have been reviewed.    Significant Imaging: I have reviewed all pertinent imaging  results/findings within the past 24 hours.    Assessment/Plan:      * ESTHER (acute kidney injury)    Patient presents with hematuria and ESTHER with Cr of 1.6  - DDx includes trauma, BPH, obstructive stone, UTI  - However his ESTHER could be pre-renal due to poor oral intake in the NH. Encourage oral fluid intake with meals   - He was previous admitted for for hematuria that resolved with fluids and barton placement.  Given 2 boluses of IVF in the ER.  - Cont Flomax and Finasteride.  -  Vitals stable. Afebrile  Anticipating discharge on 08/23.          Complicated UTI (urinary tract infection)    - Was being treated for UTI during prior admission last week. Discharged on Keflex 500mg Q12 for 7 days.  - Admitted again on 08/16 for AMS and hematuria (was tugging on his catheter in the nursing home).  - UA at admission showed 6+ leukocytes, nitrite neg; No e/o UTI  Infection has responded to antibiotics.  Anticipating discharge on 08/23.          Paroxysmal atrial fibrillation    IL 80, irregular rhythm  Apixaban restarted        Urinary retention    - Barton's in place for urinary retention.  - Barton care  - Cont Flomax and Finasteride        Essential hypertension    Continue coreg 12.5mg BID.        Vascular dementia without behavioral disturbance    Continue mematine  Seroquel 50mg once daily as needed.          Partial symptomatic epilepsy with complex partial seizures, not intractable, without status epilepticus    Continue Keppra and phenobarbitol        Acquired hypothyroidism    Continue levothyroxine          VTE Risk Mitigation         Ordered     apixaban tablet 5 mg  2 times daily     Route:  Oral        08/18/17 1097              Scott Ramos MD  Department of Hospital Medicine   [facility]

## 2017-08-21 NOTE — PLAN OF CARE
Extended Emergency Contact Information  Primary Emergency Contact: Marta Sanches  Address: [address] [place] [id number] [facility]  Mobile Phone: [phone]  Relation: Daughter    Luis A YANG iMlton MD  [address] / [facility] [address]      [facility] [address] [place], [place] - [address] AT [facility] [address]  [address]  [facility] [address]  Phone: [phone] Fax: [phone]    Future Appointments  Date Time Provider Department Center   9/27/2017 3:00 PM [facility] FRACTURE CARE CLINIC [facility] [facility] FRA CAR [address]        08/21/17 1463   Discharge Assessment   Assessment Type Discharge Planning Reassessment   Confirmed/corrected address and phone number on facesheet? Yes   Assessment information obtained from? Patient   Expected Length of Stay (days) 5   Prior to hospitilization cognitive status: Not Oriented to Time;Not Oriented to Place   Prior to hospitalization functional status: Partially Dependent   Current cognitive status: Not Oriented to Place;Not Oriented to Time   Current Functional Status: Partially Dependent   Arrived From assisted living   Lives With facility resident   Able to Return to Prior Arrangements no   Is patient able to care for self after discharge? No   How many people do you have in your home that can help with your care after discharge? other (see comments)   Patient's perception of discharge disposition skilled nursing facility   Readmission Within The Last 30 Days previous discharge plan unsuccessful   Patient currently being followed by outpatient case management? No   Patient currently receives home health services? No   Does the patient currently use HME? Yes   Patient currently receives private duty nursing? N/A   Patient currently receives any other outside agency services? No   Equipment Currently Used at Home cane, straight;shower chair   Do you have any problems affording any of your prescribed  medications? No   Is the patient taking medications as prescribed? yes   Do you have any financial concerns preventing you from receiving the healthcare you need? No   Does the patient have transportation to healthcare appointments? Yes   Transportation Available family or friend will provide   On Dialysis? No   Does the patient receive services at the Coumadin Clinic? No   Are there any open cases? No   Discharge Plan A Skilled Nursing Facility   Discharge Plan B New Nursing Home placement - [facility]   Patient/Family In Agreement With Plan unable to assess

## 2017-08-21 NOTE — PLAN OF CARE
SW followed up with pt's daughter via phone to obtain facility choices. Here only choice was [facility]. SW will sed referral via [facility] per VANESSA Rincon.     PPD and MD orders pending

## 2017-08-21 NOTE — ASSESSMENT & PLAN NOTE
- Was being treated for UTI during prior admission last week. Discharged on Keflex 500mg Q12 for 7 days.  - Admitted again on 08/16 for AMS and hematuria (was tugging on his catheter in the nursing home).  - UA at admission showed 6+ leukocytes, nitrite neg; No e/o UTI  Infection has responded to antibiotics.  Anticipating discharge on 08/23.

## 2017-08-22 PROBLEM — I26.92 ACUTE SADDLE PULMONARY EMBOLISM WITHOUT ACUTE COR PULMONALE: Status: RESOLVED | Noted: 2017-01-01 | Resolved: 2017-01-01

## 2017-08-22 NOTE — PT/OT/SLP EVAL
Occupational Therapy  Evaluation/ Discharge Summary    Virgil Melo Jr.   MRN: [id number]   Admitting Diagnosis: ESTHER (acute kidney injury)    OT Date of Treatment: 08/22/17   OT Start Time: 1130  OT Stop Time: 1140  OT Total Time (min): 10 min    Billable Minutes:  Evaluation 10 minutes    Diagnosis: ESTHER (acute kidney injury)   Decreased strength, endurance, balance    Past Medical History:   Diagnosis Date    Acquired hypothyroidism 2/20/2013    Acute deep vein thrombosis (DVT) of popliteal vein of right lower extremity 07/01/2017    Acute saddle pulmonary embolism without acute cor pulmonale 7/2/2017    Benign non-nodular prostatic hyperplasia without lower urinary tract symptoms     Closed intertrochanteric fracture of right femur s/p IMN on 7/3/17 7/1/2017    Essential hypertension     HEARING LOSS     uses hearing aids    Macular degeneration - Both Eyes 1/15/2013    Mixed hyperlipidemia 10/30/2012    Paroxysmal atrial fibrillation 7/2/2017    Peripheral vascular disease 10/30/2012    Posterior vitreous detachment 1/15/2013    Pseudophakia of both eyes 1/15/2013    S/P AVR (aortic valve replacement) 10/30/2012    Scoliosis (and kyphoscoliosis), idiopathic     Seizure     Stroke     Vascular dementia without behavioral disturbance 10/9/2012    Vitamin D deficiency disease 7/6/2017      Past Surgical History:   Procedure Laterality Date    AORTIC VALVE REPLACEMENT  08/2011    23 mm porcine AVR    CARDIAC CATHETERIZATION  6/11    mild non-obstructive CAD    CATARACT EXTRACTION, BILATERAL      CHOLECYSTECTOMY      TON FILTER PLACEMENT  07/03/2017    Consider removal in 2 months     HEMORRHOID SURGERY      INTERTROCHANTERIC HIP FRACTURE SURGERY Right 07/03/2017    IM nail       Referring physician: JOCELYNE Albarran  Date referred to OT: 8/22/2017    General Precautions: Standard, fall, hearing impaired    Do you have any cultural, spiritual, Moravian conflicts, given your current situation?:  "none stated     Patient History:  Living Environment  Living Environment Comment: per pt, he lives with his wife and daughter, and they can help him. he reports being (I), and does not need therapy    Prior level of function:   Bed Mobility/Transfers: independent  Grooming: independent  Bathing: independent  Upper Body Dressing: independent  Lower Body Dressing: independent  Toileting: independent  Home Management Skills: independent  Homemaking Responsibilities: Yes     Dominant hand: right    Subjective:  Communicated with RN prior to session.  "Why are you asking me all these questions. That's why I have a wife and a daughter. They watch me and I watch them. You can go now, I don't need all this."  Chief Complaint: being bothered by OT  Patient/Family stated goals: go home    Pain/Comfort  Pain Rating 1: 0/10  Pain Rating Post-Intervention 1: 0/10    Objective:  Patient found with: barton catheter, pt found UIC and initially agreeable to therapy.    Cognitive Exam:  Oriented to: Person, Place and Situation  Follows Commands/attention: Follows two-step commands  Communication: clear/fluent  Memory:  Impaired STM  Safety awareness/insight to disability: impaired  Coping skills/emotional control: Anger and Lashes out    Visual/perceptual:  Intact    Physical Exam:  Postural examination/scapula alignment: Head forward  Skin integrity: Visible skin intact  Edema: None noted     Sensation:   Intact    Upper Extremity Range of Motion:  Right Upper Extremity: WFL  Left Upper Extremity: WFL    Upper Extremity Strength:  Right Upper Extremity: WFL  Left Upper Extremity: WFL   Strength: Good    Fine motor coordination:   Intact    Gross motor coordination: WFL    Functional Mobility:  Bed Mobility:   NT    Transfers:  Sit <> Stand Assistance: Activity did not occur    Functional Ambulation: NT    Activities of Daily Living:    LE Dressing Level of Assistance: Maximum assistance (doff/don socks)    Balance:   Static Sit: " "GOOD: Takes MODERATE challenges from all directions  Dynamic Sit: FAIR+: Maintains balance through MINIMAL excursions of active trunk motion    Therapeutic Activities and Exercises:  Pt ed re OT role and POC. Pt performed strength and ROM testing. Pt doff/donned socks with Rolf SORIA. Pt refused therapy.    AM-PAC 6 CLICK ADL  How much help from another person does this patient currently need?  1 = Unable, Total/Dependent Assistance  2 = A lot, Maximum/Moderate Assistance  3 = A little, Minimum/Contact Guard/Supervision  4 = None, Modified [place]/Independent    Putting on and taking off regular lower body clothing? : 2  Bathing (including washing, rinsing, drying)?: 2  Toileting, which includes using toilet, bedpan, or urinal? : 2  Putting on and taking off regular upper body clothing?: 3  Taking care of personal grooming such as brushing teeth?: 3  Eating meals?: 3  Total Score: 15    AM-PAC Raw Score CMS "G-Code Modifier Level of Impairment Assistance   6 % Total / Unable   7 - 9 CM 80 - 100% Maximal Assist   10-14 CL 60 - 80% Moderate Assist   15 - 19 CK 40 - 60% Moderate Assist   20 - 22 CJ 20 - 40% Minimal Assist   23 CI 1-20% SBA / CGA   24 CH 0% Independent/ Mod I       Patient left up in chair with all lines intact and call button in reach    Assessment:  Virgil Melo Jr. is a 88 y.o. male with a medical diagnosis of ESTHER (acute kidney injury) and presents with the impairments listed below. Pt is not pleasant and became agitated with OT assessment of dressing skills. Pt demo decreased dynamic sitting balance and would benefit from cont OT to improve safety and ability re self care skills. However, pt repeatedly stated he does not want "to do all of this." He stated that he can get dressed (but was unable to doff/don socks, or sit unsupported for longer than 10 seconds in the recliner). Pt was unwilling to participate in further therapy and asked this OT to leave. OT to d/c as pt stated again he " does not want to do any of this.    Pt evaluation falls under low complexity for evaluation coding due to performance deficits noted in 1-3 areas as stated above and 0 co-morbities affecting current functional status. Data obtained from problem focused assessments. No modifications or assistance was required for completion of evaluation. Only brief occupational profile and history review completed.       Rehab identified problem list/impairments: Rehab identified problem list/impairments: weakness, impaired endurance, impaired self care skills, impaired functional mobilty, impaired balance, decreased lower extremity function, decreased safety awareness    Rehab potential is fair.    Activity tolerance: Poor - likely motivational    Discharge recommendations: Discharge Facility/Level Of Care Needs:  (See PT rec's; pt does not want OT)     Barriers to discharge: Barriers to Discharge: None    Equipment recommendations: none     GOALS:    Occupational Therapy Goals     Not on file          Multidisciplinary Problems (Resolved)        Problem: Occupational Therapy Goal    Goal Priority Disciplines Outcome Interventions   Occupational Therapy Goal   (Resolved)     OT, PT/OT Outcome(s) achieved                    PLAN:  OT to d/c.    GUILLERMO Harkins  8/22/2017  Pager: [phone]

## 2017-08-22 NOTE — PLAN OF CARE
Problem: Patient Care Overview  Goal: Plan of Care Review  Outcome: Ongoing (interventions implemented as appropriate)  [place]a sys monitor at bedside in use,no falls at present,no pressure ulcer noted,pt requires reorientation,[place],afebrile.

## 2017-08-22 NOTE — PLAN OF CARE
REFERRAL SENT TO           [facility] [phone]                    [facility] is f/u.

## 2017-08-22 NOTE — PLAN OF CARE
Problem: Patient Care Overview  Goal: Plan of Care Review  Outcome: Ongoing (interventions implemented as appropriate)  VVS, patient tolerated short walk w PT today, spent 3 hours up in the chair. On-going confusion. POC being adhered to, zero falls/ skin breaks or new sources of infection.

## 2017-08-22 NOTE — PROGRESS NOTES
[facility] Hospital Medicine  Progress Note    Patient Name: Virgil Melo Jr.  MRN: [id number]  Patient Class: OP- Observation   Admission Date: 8/16/2017  Length of Stay: 0 days  Attending Physician: Ashley Albarran MD  Primary Care Provider: Luis A Milton MD    [facility] Medicine Team: [facility] HOSP MED 1 PROSPER Townsend    Subjective:     Principal Problem:ESTHER (acute kidney injury)    HPI:  Mr Melo is an 89 y/o M with a history of epilepsy, vascular dementia (on memantine), Afib and pulmonary embolism (on eliquis), recent hip fracture with surgical repair on 7/3, recent UTI w/ bactrim, and a recent admission for altered mental status presents to the ED with hematuria.    Patient had barton placed during last stay (8/9 to 8/14) for obstructive uropathy and per daughter, nursing staff noted that he was handling barton frequently.  Has not had long-term barton before.  Today staff noticed gross blood in barton bag, sent to ED.  Patient unable to provide history. Daughter reported patient is at baseline.  Reports combative with staff during insertion of barton.    On his previous admission, patient was noted to have suprapubic tenderness and palpable bladder, with additional concern for UTI. UTI was treated with ceftriaxone. Barton helped to resolve retention with 1675 cc's drained. Noted to have concurrent ESTHER with 1.3 thought to be obstructive in etiology.     I have obtained history from ED records. Patient is altered.     Hospital Course:  Upon ED admission, bolused with 1 L fluids, Cefepime and Vancomycin.     08/18: NAEO. Vitals stable.  Oriented only to person. No further bleeding from urethra, Barton clear.    08/19: NAEO. Vitals stable.  Oriented to person and place. No further bleeding.    08/20: NAEO. Vitals stable.  Oriented x3. Pending discharge tomm.     08/21: NAEO. Vitals stable.  Oriented x3. Pending discharge on 08/23.    Interval History: NAEO. Doing well. Vitals stable.  Pending  placement.    Review of Systems   Constitutional: Negative for chills and fever.   Respiratory: Negative for cough, chest tightness and shortness of breath.    Cardiovascular: Negative for chest pain.   Gastrointestinal: Negative for abdominal pain, constipation, diarrhea, nausea and vomiting.   Genitourinary: Negative for difficulty urinating and urgency.   Musculoskeletal: Negative for arthralgias.   Skin: Negative for color change.   Neurological: Negative for dizziness and light-headedness.   Psychiatric/Behavioral: Negative for agitation and behavioral problems.     Objective:     Vital Signs (Most Recent):  Temp: 98.1 °F (36.7 °C) (08/22/17 0734)  Pulse: 60 (08/22/17 0734)  Resp: 16 (08/22/17 0734)  BP: 125/65 (08/22/17 0734)  SpO2: (!) 94 % (08/22/17 0734) Vital Signs (24h Range):  Temp:  [97.4 °F (36.3 °C)-99 °F (37.2 °C)] 98.1 °F (36.7 °C)  Pulse:  [60-83] 60  Resp:  [16-17] 16  SpO2:  [92 %-96 %] 94 %  BP: (100-142)/(51-76) 125/65     Weight: 83 kg (182 lb 15.7 oz)  Body mass index is 24.14 kg/m².    Intake/Output Summary (Last 24 hours) at 08/22/17 1204  Last data filed at 08/22/17 0500   Gross per 24 hour   Intake              830 ml   Output              570 ml   Net              260 ml      Physical Exam   Constitutional: He is oriented to person, place, and time.   Cardiovascular: Normal rate, regular rhythm, normal heart sounds and intact distal pulses.    Pulmonary/Chest: Effort normal and breath sounds normal.   Abdominal: Soft. Bowel sounds are normal.   Neurological: He is alert and oriented to person, place, and time.   Skin: Skin is warm.   Nursing note and vitals reviewed.      Significant Labs:   A1C: No results for input(s): HGBA1C in the last 4320 hours.  ABGs: No results for input(s): PH, PCO2, HCO3, POCSATURATED, BE, TOTALHB, COHB, METHB, O2HB, POCFIO2 in the last 48 hours.  Bilirubin:     Recent Labs  Lab 08/09/17  0718 08/16/17  2139 08/20/17  0936 08/22/17  0835   BILITOT 0.3 0.3 0.3  0.3     Blood Culture: No results for input(s): LABBLOO in the last 48 hours.  BMP:     Recent Labs  Lab 08/22/17  0835   *      K 4.0   *   CO2 19*   BUN 16   CREATININE 1.2   CALCIUM 9.0     CBC:     Recent Labs  Lab 08/22/17  0836   WBC 9.89   HGB 10.7*   HCT 33.7*        CMP:     Recent Labs  Lab 08/22/17  0835      K 4.0   *   CO2 19*   *   BUN 16   CREATININE 1.2   CALCIUM 9.0   PROT 7.0   ALBUMIN 2.8*   BILITOT 0.3   ALKPHOS 99   AST 15   ALT 15   ANIONGAP 12   EGFRNONAA 53.7*     Cardiac Markers: No results for input(s): CKMB, MYOGLOBIN, BNP, TROPISTAT in the last 48 hours.  Coagulation: No results for input(s): INR, APTT in the last 48 hours.    Invalid input(s): PT  Lactic Acid: No results for input(s): LACTATE in the last 48 hours.  Lipase: No results for input(s): LIPASE in the last 48 hours.  Lipid Panel: No results for input(s): CHOL, HDL, LDLCALC, TRIG, CHOLHDL in the last 48 hours.  Magnesium: No results for input(s): MG in the last 48 hours.  Pathology Results  (Last 10 years)               06/28/12 0000  Tissue Specimen to Pathology Final result        POCT Glucose: No results for input(s): POCTGLUCOSE in the last 48 hours.  Prealbumin: No results for input(s): PREALBUMIN in the last 48 hours.  Respiratory Culture: No results for input(s): GSRESP, RESPIRATORYC in the last 48 hours.  Troponin: No results for input(s): TROPONINI in the last 48 hours.  TSH:     Recent Labs  Lab 08/09/17  0342   TSH 1.419     Urine Culture: No results for input(s): LABURIN in the last 48 hours.  Urine Studies: No results for input(s): COLORU, APPEARANCEUA, PHUR, SPECGRAV, PROTEINUA, GLUCUA, KETONESU, BILIRUBINUA, OCCULTUA, NITRITE, UROBILINOGEN, LEUKOCYTESUR, RBCUA, WBCUA, BACTERIA, SQUAMEPITHEL, HYALINECASTS in the last 48 hours.    Invalid input(s): WRIGHTSUR  All pertinent labs within the past 24 hours have been reviewed.    Significant Imaging: I have reviewed all  pertinent imaging results/findings within the past 24 hours.    Assessment/Plan:      Urinary retention    - Archer's in place for urinary retention.  - will attempt voiding trial today.         Complicated UTI (urinary tract infection)    Completed 10 day course of keflex for Citrobacter UTI.           Paroxysmal atrial fibrillation    [place] 80, irregular rhythm  Apixaban restarted        Acute saddle pulmonary embolism without acute cor pulmonale    CTA in July 2017 found saddle embolus.   Continue apixaban           Acquired hypothyroidism    Continue levothyroxine        Vascular dementia without behavioral disturbance    Continue mematine  Seroquel 50mg once daily as needed.          Benign non-nodular prostatic hyperplasia without lower urinary tract symptoms              Essential hypertension    Continue coreg 12.5mg BID.        Partial symptomatic epilepsy with complex partial seizures, not intractable, without status epilepticus    Continue Keppra and phenobarbitol        * ESTHER (acute kidney injury)    Resolved. Likely pre-renal in setting of poor PO intake in NH. Improved with fluids.                 VTE Risk Mitigation         Ordered     apixaban tablet 5 mg  2 times daily     Route:  Oral        08/18/17 3463              PROSPER Townsend  Department of Hospital Medicine   [facility]

## 2017-08-22 NOTE — PLAN OF CARE
Aubree with [facility] followed up with [facility] and explained that they denied pt's admission because they feel that he will not be a good rehab candidate for their facility.     [facility] then placed call to pt's daughter Ms. Sanches at [phone] and made her aware of above. [facility] requested additional choices, Ms. Sanches has agreed to have a mass referral sent to local skilled facilities except [facility]. Ms. Sanches mentioned that she has a friend who is a urologist and will come check on pt this evening in order for him to give her his input on have barton removed or not.     [facility] will continue to work in SNF placement.

## 2017-08-22 NOTE — SUBJECTIVE & OBJECTIVE
Interval History: NAEO. Doing well. Vitals stable.  Pending placement.    Review of Systems   Constitutional: Negative for chills and fever.   Respiratory: Negative for cough, chest tightness and shortness of breath.    Cardiovascular: Negative for chest pain.   Gastrointestinal: Negative for abdominal pain, constipation, diarrhea, nausea and vomiting.   Genitourinary: Negative for difficulty urinating and urgency.   Musculoskeletal: Negative for arthralgias.   Skin: Negative for color change.   Neurological: Negative for dizziness and light-headedness.   Psychiatric/Behavioral: Negative for agitation and behavioral problems.     Objective:     Vital Signs (Most Recent):  Temp: 98.1 °F (36.7 °C) (08/22/17 0734)  Pulse: 60 (08/22/17 0734)  Resp: 16 (08/22/17 0734)  BP: 125/65 (08/22/17 0734)  SpO2: (!) 94 % (08/22/17 0734) Vital Signs (24h Range):  Temp:  [97.4 °F (36.3 °C)-99 °F (37.2 °C)] 98.1 °F (36.7 °C)  Pulse:  [60-83] 60  Resp:  [16-17] 16  SpO2:  [92 %-96 %] 94 %  BP: (100-142)/(51-76) 125/65     Weight: 83 kg (182 lb 15.7 oz)  Body mass index is 24.14 kg/m².    Intake/Output Summary (Last 24 hours) at 08/22/17 1204  Last data filed at 08/22/17 0500   Gross per 24 hour   Intake              830 ml   Output              570 ml   Net              260 ml      Physical Exam   Constitutional: He is oriented to person, place, and time.   Cardiovascular: Normal rate, regular rhythm, normal heart sounds and intact distal pulses.    Pulmonary/Chest: Effort normal and breath sounds normal.   Abdominal: Soft. Bowel sounds are normal.   Neurological: He is alert and oriented to person, place, and time.   Skin: Skin is warm.   Nursing note and vitals reviewed.      Significant Labs:   A1C: No results for input(s): HGBA1C in the last 4320 hours.  ABGs: No results for input(s): PH, PCO2, HCO3, POCSATURATED, BE, TOTALHB, COHB, METHB, O2HB, POCFIO2 in the last 48 hours.  Bilirubin:     Recent Labs  Lab 08/09/17  0718  08/16/17  2139 08/20/17  0936 08/22/17  0835   BILITOT 0.3 0.3 0.3 0.3     Blood Culture: No results for input(s): LABBLOO in the last 48 hours.  BMP:     Recent Labs  Lab 08/22/17  0835   *      K 4.0   *   CO2 19*   BUN 16   CREATININE 1.2   CALCIUM 9.0     CBC:     Recent Labs  Lab 08/22/17  0836   WBC 9.89   HGB 10.7*   HCT 33.7*        CMP:     Recent Labs  Lab 08/22/17  0835      K 4.0   *   CO2 19*   *   BUN 16   CREATININE 1.2   CALCIUM 9.0   PROT 7.0   ALBUMIN 2.8*   BILITOT 0.3   ALKPHOS 99   AST 15   ALT 15   ANIONGAP 12   EGFRNONAA 53.7*     Cardiac Markers: No results for input(s): CKMB, MYOGLOBIN, BNP, TROPISTAT in the last 48 hours.  Coagulation: No results for input(s): INR, APTT in the last 48 hours.    Invalid input(s): PT  Lactic Acid: No results for input(s): LACTATE in the last 48 hours.  Lipase: No results for input(s): LIPASE in the last 48 hours.  Lipid Panel: No results for input(s): CHOL, HDL, LDLCALC, TRIG, CHOLHDL in the last 48 hours.  Magnesium: No results for input(s): MG in the last 48 hours.  Pathology Results  (Last 10 years)               06/28/12 0000  Tissue Specimen to Pathology Final result        POCT Glucose: No results for input(s): POCTGLUCOSE in the last 48 hours.  Prealbumin: No results for input(s): PREALBUMIN in the last 48 hours.  Respiratory Culture: No results for input(s): GSRESP, RESPIRATORYC in the last 48 hours.  Troponin: No results for input(s): TROPONINI in the last 48 hours.  TSH:     Recent Labs  Lab 08/09/17  0342   TSH 1.419     Urine Culture: No results for input(s): LABURIN in the last 48 hours.  Urine Studies: No results for input(s): COLORU, APPEARANCEUA, PHUR, SPECGRAV, PROTEINUA, GLUCUA, KETONESU, BILIRUBINUA, OCCULTUA, NITRITE, UROBILINOGEN, LEUKOCYTESUR, RBCUA, WBCUA, BACTERIA, SQUAMEPITHEL, HYALINECASTS in the last 48 hours.    Invalid input(s): WRIGHTSUR  All pertinent labs within the past 24 hours  have been reviewed.    Significant Imaging: I have reviewed all pertinent imaging results/findings within the past 24 hours.

## 2017-08-22 NOTE — PLAN OF CARE
Problem: Occupational Therapy Goal  Goal: Occupational Therapy Goal  Outcome: Outcome(s) achieved Date Met: 08/22/17  Pt would benefit from OT, but became agitated with OT evaluation and emphatically stated he does not want therapy. OT to tawanna/ava.    GUILLERMO Harkins  8/22/2017  Pager: [phone]

## 2017-08-22 NOTE — PLAN OF CARE
Problem: Physical Therapy Goal  Goal: Physical Therapy Goal  Goals to be met by: 17    Patient will increase functional independence with mobility by performin. Supine to sit with MInimal Assistance  2. Sit to supine with MInimal Assistance  3. Sit to stand transfer with Stand-by Assistance  4. Bed to chair transfer with Contact Guard Assistance using Rolling Walker  5. Gait  x 100 feet with Contact Guard Assistance using Rolling Walker.   6. Lower extremity exercise program x20 reps per handout, with supervision    Outcome: Ongoing (interventions implemented as appropriate)  PT Goals established following initial eval    2017  Wesly Mesa, PT

## 2017-08-22 NOTE — PT/OT/SLP EVAL
Physical Therapy  Evaluation    Virgil Melo Jr.   MRN: [id number]   Admitting Diagnosis: ESTHER (acute kidney injury)    PT Received On: 08/22/17  PT Start Time: 0805     PT Stop Time: 0827    PT Total Time (min): 22 min       Billable Minutes:  Evaluation 22 Min    Diagnosis: ESTHER (acute kidney injury)     Past Medical History:   Diagnosis Date    Acquired hypothyroidism 2/20/2013    Acute deep vein thrombosis (DVT) of popliteal vein of right lower extremity 07/01/2017    Acute saddle pulmonary embolism without acute cor pulmonale 7/2/2017    Benign non-nodular prostatic hyperplasia without lower urinary tract symptoms     Closed intertrochanteric fracture of right femur s/p IMN on 7/3/17 7/1/2017    Essential hypertension     HEARING LOSS     uses hearing aids    Macular degeneration - Both Eyes 1/15/2013    Mixed hyperlipidemia 10/30/2012    Paroxysmal atrial fibrillation 7/2/2017    Peripheral vascular disease 10/30/2012    Posterior vitreous detachment 1/15/2013    Pseudophakia of both eyes 1/15/2013    S/P AVR (aortic valve replacement) 10/30/2012    Scoliosis (and kyphoscoliosis), idiopathic     Seizure     Stroke     Vascular dementia without behavioral disturbance 10/9/2012    Vitamin D deficiency disease 7/6/2017      Past Surgical History:   Procedure Laterality Date    AORTIC VALVE REPLACEMENT  08/2011    23 mm porcine AVR    CARDIAC CATHETERIZATION  6/11    mild non-obstructive CAD    CATARACT EXTRACTION, BILATERAL      CHOLECYSTECTOMY      TON FILTER PLACEMENT  07/03/2017    Consider removal in 2 months     HEMORRHOID SURGERY      INTERTROCHANTERIC HIP FRACTURE SURGERY Right 07/03/2017    IM nail       Referring physician: Tyler Adan MD  Date referred to PT: 8/21/17    General Precautions: Standard, fall, hearing impaired  Orthopedic Precautions: N/A   Braces:         Do you have any cultural, spiritual, Scientologist conflicts, given your current situation?: none  "stated    Patient History:  Lives With: alone, facility resident  Living Arrangements: assisted living  Home Layout: Able to live on 1st floor  Transportation Available: family or friend will provide  Living Environment Comment: [facility] confirms PLOF and living environment info w/ RN. Patient lives in assisted living facility. Everything is on one level and bathroom contains walk-in shower. Prior to admission, patient was Independent w/ ADLS and used a SPC for mobility.   Equipment Currently Used at Home: cane, straight  DME owned (not currently used): single point cane    Previous Level of Function:  Ambulation Skills: needs device  Transfer Skills: independent  ADL Skills: independent  Work/Leisure Activity: independent    Subjective:  Communicated with nurse prior to session.  Patient is tired of lying in bed, "im ready to get up."  Chief Complaint: weakness, decreased mobility, and impaired cognition  Patient goals: to move better    Pain/Comfort  Pain Rating 1: 0/10      Objective:   Patient found with: barton catheter     Cognitive Exam:  Oriented to: Person, Place, Time and Situation    Follows Commands/attention: Follows two-step commands  Communication: clear/fluent  Safety awareness/insight to disability: intact (Patient is slightly impulsive)    Physical Exam:  Postural examination/scapula alignment: Rounded shoulder and Head forward    Skin integrity: Visible skin intact  Edema: None noted     Sensation:   Intact    Upper Extremity:  See OT Note for further Detail    Lower Extremity Range of Motion:  Right Lower Extremity: WFL  Left Lower Extremity: WFL    Lower Extremity Strength: (Bilateral Hip Flexors 3+/5, Bilateral Quads and Ankles 4/5)  Right Lower Extremity: WFL  Left Lower Extremity: WFL     Fine motor coordination:  Intact    Gross motor coordination: WFL    Functional Mobility:  Bed Mobility:  Supine to Sit: Moderate Assistance  Sit to Supine:  (Patient left sitting in bedside " recliner)    Transfers:  Sit <> Stand Assistance: Minimum Assistance  Sit <> Stand Assistive Device: Rolling Walker    Gait:   Gait Distance: 50'  Assistance 1: Minimum assistance  Gait Assistive Device: Rolling walker  Gait Pattern: reciprocal  Gait Deviation(s): decreased luis, decreased velocity of limb motion, decreased step length    Balance:   Static Sit: GOOD-: Takes MODERATE challenges from all directions but inconsistently  Dynamic Sit: FAIR+: Maintains balance through MINIMAL excursions of active trunk motion  Static Stand: POOR+: Needs MINIMAL assist to maintain  Dynamic stand: POOR: N/A    Therapeutic Activities and Exercises:  PT Eval completed    AM-PAC 6 CLICK MOBILITY  How much help from another person does this patient currently need?   1 = Unable, Total/Dependent Assistance  2 = A lot, Maximum/Moderate Assistance  3 = A little, Minimum/Contact Guard/Supervision  4 = None, Modified [place]/Independent    Turning over in bed (including adjusting bedclothes, sheets and blankets)?: 3  Sitting down on and standing up from a chair with arms (e.g., wheelchair, bedside commode, etc.): 3  Moving from lying on back to sitting on the side of the bed?: 3  Moving to and from a bed to a chair (including a wheelchair)?: 3  Need to walk in hospital room?: 2  Climbing 3-5 steps with a railing?: 2  Total Score: 16     AM-PAC Raw Score CMS G-Code Modifier Level of Impairment Assistance   6 % Total / Unable   7 - 9 CM 80 - 100% Maximal Assist   10 - 14 CL 60 - 80% Moderate Assist   15 - 19 CK 40 - 60% Moderate Assist   20 - 22 CJ 20 - 40% Minimal Assist   23 CI 1-20% SBA / CGA   24 CH 0% Independent/ Mod I     Patient left up in chair with all lines intact, call button in reach and nurse notified, PCT present.    Assessment:   Virgil Melo Jr. is a 88 y.o. male with a medical diagnosis of ESTHER (acute kidney injury) and presents with weakness, impaired balance, gait instability, and decreased  functional mobility. Patient presents with the listed deficits. Patient is AAOx4, but is unsure about his living situation. Patient's daughter confirms the living situation w/ RN. Patient requires assistance with all mobility. Patient has poor standing balance and during gait. Patient required increased assistance to maintain standing at the end of walk while preparing to sit in bedside chair. Patient will benefit from skilled PT services to address his listed impairments. Patient had no adverse reactions to therapy session. Patient's medical status is evolving and eval complexity is Mod.    Rehab identified problem list/impairments: Rehab identified problem list/impairments: weakness, impaired endurance, impaired self care skills, impaired functional mobilty, gait instability, impaired balance, impaired cognition, decreased safety awareness    Rehab potential is good.    Activity tolerance: Good    Discharge recommendations: Discharge Facility/Level Of Care Needs: nursing facility, skilled     Barriers to discharge: Barriers to Discharge: None    Equipment recommendations:       GOALS:    Physical Therapy Goals        Problem: Physical Therapy Goal    Goal Priority Disciplines Outcome Goal Variances Interventions   Physical Therapy Goal     PT/OT, PT Ongoing (interventions implemented as appropriate)     Description:  Goals to be met by: 17    Patient will increase functional independence with mobility by performin. Supine to sit with MInimal Assistance  2. Sit to supine with MInimal Assistance  3. Sit to stand transfer with Stand-by Assistance  4. Bed to chair transfer with Contact Guard Assistance using Rolling Walker  5. Gait  x 100 feet with Contact Guard Assistance using Rolling Walker.   6. Lower extremity exercise program x20 reps per handout, with supervision                      PLAN:    Patient to be seen 4 x/week to address the above listed problems via gait training, therapeutic activities,  therapeutic exercises  Plan of Care expires: 09/21/17  Plan of Care reviewed with: patient          Wesly Moshe, PT  08/22/2017

## 2017-08-23 NOTE — SUBJECTIVE & OBJECTIVE
Interval History: NAEO. Vitals stable.  Failed voiding trial.  Discharged to SNF with Archer in place.    Review of Systems   Constitutional: Negative for chills and fever.   Respiratory: Negative for shortness of breath and wheezing.    Gastrointestinal: Negative for abdominal pain, diarrhea and vomiting.   Endocrine: Negative for cold intolerance and heat intolerance.   Genitourinary: Positive for difficulty urinating and urgency.   Neurological: Negative for facial asymmetry and weakness.   Psychiatric/Behavioral: Negative for agitation and behavioral problems.     Objective:     Vital Signs (Most Recent):  Temp: 98.6 °F (37 °C) (08/23/17 1108)  Pulse: 68 (08/23/17 1108)  Resp: 18 (08/23/17 1108)  BP: (!) 141/58 (08/23/17 1108)  SpO2: 95 % (08/23/17 1312) Vital Signs (24h Range):  Temp:  [97.7 °F (36.5 °C)-98.7 °F (37.1 °C)] 98.6 °F (37 °C)  Pulse:  [65-78] 68  Resp:  [16-18] 18  SpO2:  [93 %-96 %] 95 %  BP: ()/(52-72) 141/58     Weight: 81.6 kg (180 lb)  Body mass index is 24.41 kg/m².    Intake/Output Summary (Last 24 hours) at 08/23/17 1550  Last data filed at 08/23/17 1200   Gross per 24 hour   Intake              360 ml   Output              785 ml   Net             -425 ml      Physical Exam   Constitutional: He is oriented to person, place, and time.   Cardiovascular: Normal rate, regular rhythm, normal heart sounds and intact distal pulses.    Pulmonary/Chest: Effort normal and breath sounds normal.   Abdominal: Soft. Bowel sounds are normal.   Neurological: He is alert and oriented to person, place, and time.   Nursing note and vitals reviewed.      Significant Labs:   A1C: No results for input(s): HGBA1C in the last 4320 hours.  ABGs: No results for input(s): PH, PCO2, HCO3, POCSATURATED, BE, TOTALHB, COHB, METHB, O2HB, POCFIO2 in the last 48 hours.  Bilirubin:   Recent Labs  Lab 08/09/17  0718 08/16/17  2139 08/20/17  0936 08/22/17  0835   BILITOT 0.3 0.3 0.3 0.3     Blood Culture: No results for  input(s): LABBLOO in the last 48 hours.  BMP:   Recent Labs  Lab 08/22/17  0835   *      K 4.0   *   CO2 19*   BUN 16   CREATININE 1.2   CALCIUM 9.0     CBC:   Recent Labs  Lab 08/22/17  0836   WBC 9.89   HGB 10.7*   HCT 33.7*        CMP:   Recent Labs  Lab 08/22/17  0835      K 4.0   *   CO2 19*   *   BUN 16   CREATININE 1.2   CALCIUM 9.0   PROT 7.0   ALBUMIN 2.8*   BILITOT 0.3   ALKPHOS 99   AST 15   ALT 15   ANIONGAP 12   EGFRNONAA 53.7*     Cardiac Markers: No results for input(s): CKMB, MYOGLOBIN, BNP, TROPISTAT in the last 48 hours.  Coagulation: No results for input(s): INR, APTT in the last 48 hours.    Invalid input(s): PT  Lactic Acid: No results for input(s): LACTATE in the last 48 hours.  Lipase: No results for input(s): LIPASE in the last 48 hours.  Lipid Panel: No results for input(s): CHOL, HDL, LDLCALC, TRIG, CHOLHDL in the last 48 hours.  Magnesium: No results for input(s): MG in the last 48 hours.  Pathology Results  (Last 10 years)               06/28/12 0000  Tissue Specimen to Pathology Final result        POCT Glucose: No results for input(s): POCTGLUCOSE in the last 48 hours.  Prealbumin: No results for input(s): PREALBUMIN in the last 48 hours.  Respiratory Culture: No results for input(s): GSRESP, RESPIRATORYC in the last 48 hours.  Troponin: No results for input(s): TROPONINI in the last 48 hours.  TSH:   Recent Labs  Lab 08/09/17  0342   TSH 1.419     Urine Culture: No results for input(s): LABURIN in the last 48 hours.  Urine Studies: No results for input(s): COLORU, APPEARANCEUA, PHUR, SPECGRAV, PROTEINUA, GLUCUA, KETONESU, BILIRUBINUA, OCCULTUA, NITRITE, UROBILINOGEN, LEUKOCYTESUR, RBCUA, WBCUA, BACTERIA, SQUAMEPITHEL, HYALINECASTS in the last 48 hours.    Invalid input(s): WRIGHTSUR  All pertinent labs within the past 24 hours have been reviewed.    Significant Imaging: I have reviewed all pertinent imaging results/findings within the past  24 hours.

## 2017-08-23 NOTE — PLAN OF CARE
Pt daughter has selected [facility] for SNF placement.     MICAH attempted to followed up with Kalpesh at [facility] [phone], but SW left voice message with .

## 2017-08-23 NOTE — PLAN OF CARE
08/23/17 1539   Discharge Reassessment   Assessment Type Discharge Planning Reassessment   Provided patient/caregiver education on the expected discharge date and the discharge plan Yes   Do you have any problems affording any of your prescribed medications? No   Discharge Plan A Skilled Nursing Facility   Discharge Plan B [facility] Nursing [facility] placement [facility]   Can the patient answer the patient profile reliably? No, cognitively impaired   How does the patient rate their overall health at the present time? Good   Describe the patient's ability to walk at the present time. Major restrictions/daily assistance from another person   How often would a person be available to care for the patient? Often   Number of comorbid conditions (as recorded on the chart) Three   During the past month, has the patient often been bothered by feeling down, depressed or hopeless? No

## 2017-08-23 NOTE — PROGRESS NOTES
I have seen the patient, reviewed the Resident's assessment and plan (see note from today). I have personally interviewed and examined the patient at bedside and agree with the findings.      Failed voiding trial, Archer replaced without issue. Discharge to SNF today.     Ashley Albarran MD  [facility]

## 2017-08-23 NOTE — PROGRESS NOTES
[facility] Hospital Medicine  Progress Note    Patient Name: Virgil Melo Jr.  MRN: [id number]  Patient Class: OP- Observation   Admission Date: 8/16/2017  Length of Stay: 0 days  Attending Physician: Ashley Albarran MD  Primary Care Provider: Luis A Milton MD    [facility] Medicine Team: [facility] MED 1 Scott Ramos MD    Subjective:     Principal Problem:ESTHER (acute kidney injury)    HPI:  Mr Melo is an 89 y/o M with a history of epilepsy, vascular dementia (on memantine), Afib and pulmonary embolism (on eliquis), recent hip fracture with surgical repair on 7/3, recent UTI w/ bactrim, and a recent admission for altered mental status presents to the ED with hematuria.    Patient had barton placed during last stay (8/9 to 8/14) for obstructive uropathy and per daughter, nursing staff noted that he was handling barotn frequently.  Has not had long-term barton before.  Today staff noticed gross blood in barton bag, sent to ED.  Patient unable to provide history. Daughter reported patient is at baseline.  Reports combative with staff during insertion of barton.    On his previous admission, patient was noted to have suprapubic tenderness and palpable bladder, with additional concern for UTI. UTI was treated with ceftriaxone. Barton helped to resolve retention with 1675 cc's drained. Noted to have concurrent ESTHER with 1.3 thought to be obstructive in etiology.     I have obtained history from ED records. Patient is altered.     Hospital Course:  Upon ED admission, bolused with 1 L fluids, Cefepime and Vancomycin.     08/18: NAEO. Vitals stable.  Oriented only to person. No further bleeding from urethra, Barton clear.    08/19: NAEO. Vitals stable.  Oriented to person and place. No further bleeding.    08/20: NAEO. Vitals stable.  Oriented x3. Pending discharge tomm.     08/21: NAEO. Vitals stable.  Oriented x3. Pending discharge on 08/23.    08/23: NAEO. Vitals stable. Barton removed today at 3 am  for voiding trial.  Patient failed voiding trial, Archer re-inserted.  * Post-voiding scan - ~350ml    Interval History: NAEO. Vitals stable.  Failed voiding trial.  Discharged to SNF with Archer in place.    Review of Systems   Constitutional: Negative for chills and fever.   Respiratory: Negative for shortness of breath and wheezing.    Gastrointestinal: Negative for abdominal pain, diarrhea and vomiting.   Endocrine: Negative for cold intolerance and heat intolerance.   Genitourinary: Positive for difficulty urinating and urgency.   Neurological: Negative for facial asymmetry and weakness.   Psychiatric/Behavioral: Negative for agitation and behavioral problems.     Objective:     Vital Signs (Most Recent):  Temp: 98.6 °F (37 °C) (08/23/17 1108)  Pulse: 68 (08/23/17 1108)  Resp: 18 (08/23/17 1108)  BP: (!) 141/58 (08/23/17 1108)  SpO2: 95 % (08/23/17 1312) Vital Signs (24h Range):  Temp:  [97.7 °F (36.5 °C)-98.7 °F (37.1 °C)] 98.6 °F (37 °C)  Pulse:  [65-78] 68  Resp:  [16-18] 18  SpO2:  [93 %-96 %] 95 %  BP: ()/(52-72) 141/58     Weight: 81.6 kg (180 lb)  Body mass index is 24.41 kg/m².    Intake/Output Summary (Last 24 hours) at 08/23/17 1550  Last data filed at 08/23/17 1200   Gross per 24 hour   Intake              360 ml   Output              785 ml   Net             -425 ml      Physical Exam   Constitutional: He is oriented to person, place, and time.   Cardiovascular: Normal rate, regular rhythm, normal heart sounds and intact distal pulses.    Pulmonary/Chest: Effort normal and breath sounds normal.   Abdominal: Soft. Bowel sounds are normal.   Neurological: He is alert and oriented to person, place, and time.   Nursing note and vitals reviewed.      Significant Labs:   A1C: No results for input(s): HGBA1C in the last 4320 hours.  ABGs: No results for input(s): PH, PCO2, HCO3, POCSATURATED, BE, TOTALHB, COHB, METHB, O2HB, POCFIO2 in the last 48 hours.  Bilirubin:   Recent Labs  Lab 08/09/17  0718  08/16/17  2139 08/20/17  0936 08/22/17  0835   BILITOT 0.3 0.3 0.3 0.3     Blood Culture: No results for input(s): LABBLOO in the last 48 hours.  BMP:   Recent Labs  Lab 08/22/17  0835   *      K 4.0   *   CO2 19*   BUN 16   CREATININE 1.2   CALCIUM 9.0     CBC:   Recent Labs  Lab 08/22/17  0836   WBC 9.89   HGB 10.7*   HCT 33.7*        CMP:   Recent Labs  Lab 08/22/17  0835      K 4.0   *   CO2 19*   *   BUN 16   CREATININE 1.2   CALCIUM 9.0   PROT 7.0   ALBUMIN 2.8*   BILITOT 0.3   ALKPHOS 99   AST 15   ALT 15   ANIONGAP 12   EGFRNONAA 53.7*     Cardiac Markers: No results for input(s): CKMB, MYOGLOBIN, BNP, TROPISTAT in the last 48 hours.  Coagulation: No results for input(s): INR, APTT in the last 48 hours.    Invalid input(s): PT  Lactic Acid: No results for input(s): LACTATE in the last 48 hours.  Lipase: No results for input(s): LIPASE in the last 48 hours.  Lipid Panel: No results for input(s): CHOL, HDL, LDLCALC, TRIG, CHOLHDL in the last 48 hours.  Magnesium: No results for input(s): MG in the last 48 hours.  Pathology Results  (Last 10 years)               06/28/12 0000  Tissue Specimen to Pathology Final result        POCT Glucose: No results for input(s): POCTGLUCOSE in the last 48 hours.  Prealbumin: No results for input(s): PREALBUMIN in the last 48 hours.  Respiratory Culture: No results for input(s): GSRESP, RESPIRATORYC in the last 48 hours.  Troponin: No results for input(s): TROPONINI in the last 48 hours.  TSH:   Recent Labs  Lab 08/09/17  0342   TSH 1.419     Urine Culture: No results for input(s): LABURIN in the last 48 hours.  Urine Studies: No results for input(s): COLORU, APPEARANCEUA, PHUR, SPECGRAV, PROTEINUA, GLUCUA, KETONESU, BILIRUBINUA, OCCULTUA, NITRITE, UROBILINOGEN, LEUKOCYTESUR, RBCUA, WBCUA, BACTERIA, SQUAMEPITHEL, HYALINECASTS in the last 48 hours.    Invalid input(s): MAGGY  All pertinent labs within the past 24 hours have been  reviewed.    Significant Imaging: I have reviewed all pertinent imaging results/findings within the past 24 hours.    Assessment/Plan:      * ESTHER (acute kidney injury)    Resolved. Likely pre-renal in setting of poor PO intake in NH. Improved with fluids.               Complicated UTI (urinary tract infection)    Completed 10 day course of keflex for Citrobacter UTI.           Paroxysmal atrial fibrillation    NE 80, irregular rhythm  Apixaban restarted        Urinary retention    - Barton's in place for urinary retention.    -Attempted voiding trial today which patient failed.  *batron removed at 3 am, patient voided around 150ml and post-voiding bladder scan showed ~350ml  -Barton re-inserted    Discharged to SNF.          Essential hypertension    BP 97/52  Continue coreg 12.5mg BID.        Vascular dementia without behavioral disturbance    Continue mematine  Seroquel 50mg once daily as needed.          Partial symptomatic epilepsy with complex partial seizures, not intractable, without status epilepticus    Continue Keppra and phenobarbitol        Acquired hypothyroidism    Continue levothyroxine        Benign non-nodular prostatic hyperplasia without lower urinary tract symptoms                Diet: Regular  Dispo: [facility]    VTE Risk Mitigation         Ordered     apixaban tablet 5 mg  2 times daily     Route:  Oral        08/18/17 7323              Scott Ramos MD  Department of Hospital Medicine   [facility]

## 2017-08-23 NOTE — PLAN OF CARE
Problem: Patient Care Overview  Goal: Plan of Care Review  Outcome: Ongoing (interventions implemented as appropriate)  VVS, failed non barton trial. After bladder scan had to reinsert barton per order. TB skin test placed for SNF placement. POC adhered to, skin remains intact but requires frequent turning as sacral area is getting red, barrier paste applied.

## 2017-08-23 NOTE — PT/OT/SLP DISCHARGE
Physical Therapy Discharge Summary    Virgil Melo Jr.  MRN: [id number]   Complicated UTI (urinary tract infection)   Patient Discharged from acute Physical Therapy on 2017.  Please refer to prior PT noted date on 2017 for functional status.     Assessment:   Patient appropriate for care in another setting.  GOALS:    Physical Therapy Goals     Not on file          Multidisciplinary Problems (Resolved)        Problem: Physical Therapy Goal    Goal Priority Disciplines Outcome Goal Variances Interventions   Physical Therapy Goal   (Resolved)     PT/OT, PT Outcome(s) achieved     Description:  Goals to be met by: 17     Patient will increase functional independence with mobility by performin. Supine to sit with MInimal Assistance - Met   Updated: Supine to sit with Mod I  2. Sit to supine with MInimal Assistance  3. Sit to stand transfer with Minimal Assistance - Met   Updated: Sit to stand transfer with CGA  4. Gait  x 10 feet with Maximum Assistance using Rolling Walker. - Met   Updated: Gait x 10 feet with Min Assistance using rolling walker  5. Stand for 2 minutes with Moderate Assistance using Rolling Walker for UE support, while performing reaching activity.   6. Lower extremity exercise program x10 reps with assistance as needed                     Reasons for Discontinuation of Therapy Services  Transfer to alternate level of care.      Plan:  Patient Discharged to: Home with Home Health Service.    Branden Feliciano III, DPT  2017

## 2017-08-23 NOTE — ASSESSMENT & PLAN NOTE
- Barton's in place for urinary retention.    -Attempted voiding trial today which patient failed.  *barton removed at 3 am, patient voided around 150ml and post-voiding bladder scan showed ~350ml  -Barton re-inserted    Discharged to SNF.

## 2017-08-23 NOTE — PLAN OF CARE
MICAH placed call to Ms. Sanches [phone] and made a aware that voiding trail was failed and barton had to be replaced and decision needs to be made today on snf placement. MICAH reminded Ms. Sanches that pt is currently in observations status, he's medically stable to discharge today and that pt has been clinically accepted to 4 SNF's.

## 2017-08-23 NOTE — PLAN OF CARE
[facility]     Department of Hospital Medicine     [address] [place] [address]     [phone] after hours  [phone] fax       NURSING HOME ORDERS    08/23/2017    Admit to Nursing Home:    Skilled Bed                                      Diagnoses:  Active Hospital Problems    Diagnosis  POA    *ESTHER (acute kidney injury) [N17.9]  Yes     Priority: 1 - High    Complicated UTI (urinary tract infection) [N39.0]  Yes     Priority: 2     Paroxysmal atrial fibrillation [I48.0]  Yes     Priority: 3     Urinary retention [R33.9]  Yes     Priority: 4     Essential hypertension [I10]  Yes     Priority: 5     Vascular dementia without behavioral disturbance [F01.50]  Yes     Priority: 6     Partial symptomatic epilepsy with complex partial seizures, not intractable, without status epilepticus [G40.209]  Yes     Priority: 7     Hematuria [R31.9]  Yes    Acquired hypothyroidism [E03.9]  Yes    Benign non-nodular prostatic hyperplasia without lower urinary tract symptoms [N40.0]  Yes      Resolved Hospital Problems    Diagnosis Date Resolved POA    Acute saddle pulmonary embolism without acute cor pulmonale [I26.92] 08/22/2017 Yes       Patient is homebound due to:  ESTHER (acute kidney injury)    Allergies:  Review of patient's allergies indicates:   Allergen Reactions    Penicillins Hives     Tolerated ceftriaxone and cephalexin       Vitals: Per Facility Protocol.    Diet: Regular diet (as tolerated)    Acitivities: As Tolerated    LABS:  Per facility protocol    Nursing Precautions:    - Aspiration precautions:             - Total assistance with meals            -  Upright 90 degrees befor during and after meals             -  Suction at bedside          - Fall precautions per nursing home protocol   - Seizure precaution per jail protocol   - Decubitus precautions:        -  for positioning   - Pressure reducing foam mattress   - Turn  patient every two hours. Use wedge pillows to anchor patient    CONSULTS:   Physical Therapy to evaluate and treat       MISCELLANEOUS CARE:  Archer Care: Empty Archer bag every shift.  Change Archer every month              Medications:    Virgil Melo Jr.   Home Medication Instructions NEETA:[id number]    Printed on:08/23/17 6276   Medication Information                      acetaminophen (TYLENOL) 650 MG TbSR  Take 650 mg by mouth every 6 (six) hours as needed (mild to moderate pain).             apixaban 5 mg Tab  Take 2 tablets (10mg) by mouth twice daily            atorvastatin (LIPITOR) 40 MG tablet  Take 1 tablet (40 mg total) by mouth once daily.             calcium carbonate (OS-ROSA) 500 mg calcium (1,250 mg) tablet  Take 2 tablets (1,000 mg total) by mouth once daily.             carvedilol (COREG) 25 MG tablet  Take 1 tablet (25 mg total) by mouth 2 (two) times daily.             ergocalciferol (ERGOCALCIFEROL) 50,000 unit Cap  Take 1 capsule (50,000 Units total) by mouth Every Friday.             finasteride (PROSCAR) 5 mg tablet  Take 1 tablet (5 mg total) by mouth once daily.             gabapentin (NEURONTIN) 800 MG tablet  Take 1 tablet (800 mg total) by mouth 3 (three) times daily.             levetiracetam (KEPPRA) 750 MG Tab  Take 1 tablet (750 mg total) by mouth 2 (two) times daily.             levothyroxine (SYNTHROID) 150 MCG tablet  Take 1 tablet (150 mcg total) by mouth before breakfast.             memantine (NAMENDA) 10 MG Tab  Take 1 tablet (10 mg total) by mouth 2 (two) times daily.             multivitamin (ONE DAILY MULTIVITAMIN) per tablet  Take 1 tablet by mouth once daily.             phenobarbital (LUMINAL) 32.4 MG tablet  Take 32.4 mg by mouth 3 (three) times daily.             quetiapine 50 mg oral tb24 (SEROQUEL XR) 50 mg Tb24  Take 1 tablet (50 mg total) by mouth once as needed (Agitation).             senna-docusate 8.6-50 mg (PERICOLACE) 8.6-50 mg per tablet  Take 1 tablet by  mouth 2 (two) times daily as needed for Constipation.             tamsulosin (FLOMAX) 0.4 mg Cp24  Take 1 capsule (0.4 mg total) by mouth once daily.             zinc sulfate 220 mg Tab  Take 1 tablet by mouth once daily.                       _________________________________  Scott Ramos MD  08/23/2017

## 2017-08-23 NOTE — DISCHARGE SUMMARY
[facility] Hospital Medicine  Discharge Summary      Patient Name: Virgil Melo Jr.  MRN: [id number]  Admission Date: 8/16/2017  Hospital Length of Stay: 0 days  Discharge Date and Time:  08/24/2017 4:02 PM  Attending Physician: Ashley Albarran MD   Discharging Provider: Scott Ramos MD  Primary Care Provider: Luis A Milton MD  [facility] Medicine Team: [facility] HOSP MED 1 Scott Ramos MD    HPI:   Mr Melo is an 89 y/o M with a history of epilepsy, vascular dementia (on memantine), Afib and pulmonary embolism (on eliquis), recent hip fracture with surgical repair on 7/3, recent UTI w/ bactrim, and a recent admission for altered mental status presents to the ED with hematuria.    Patient had barton placed during last stay (8/9 to 8/14) for obstructive uropathy and per daughter, nursing staff noted that he was handling barton frequently.  Has not had long-term barton before.  Today staff noticed gross blood in barton bag, sent to ED.  Patient unable to provide history. Daughter reported patient is at baseline.  Reports combative with staff during insertion of barton.    On his previous admission, patient was noted to have suprapubic tenderness and palpable bladder, with additional concern for UTI. UTI was treated with ceftriaxone. Barton helped to resolve retention with 1675 cc's drained. Noted to have concurrent ESTHER with 1.3 thought to be obstructive in etiology.     I have obtained history from ED records. Patient is altered.     * No surgery found *      Indwelling Lines/Drains at time of discharge:   Lines/Drains/Airways     Drain                 Urethral Catheter 08/23/17 1148 less than 1 day              Hospital Course:   Upon ED admission, bolused with 1 L fluids, Cefepime and Vancomycin.     08/18: NAEO. Vitals stable.  Oriented only to person. No further bleeding from urethra, Barton clear.    08/19: NAEO. Vitals stable.  Oriented to person and place. No further  bleeding.    08/20: NAEO. Vitals stable.  Oriented x3. Pending discharge tomm.     08/21: NAEO. Vitals stable.  Oriented x3. Pending discharge on 08/23.    08/23: NAEO. Vitals stable. Archer removed today at 3 am for voiding trial.  Patient failed voiding trial, Archer re-inserted.  * Post-voiding scan - ~350ml     Consults:     Significant Diagnostic Studies: Labs:   BMP:   Recent Labs  Lab 08/22/17  0835   *      K 4.0   *   CO2 19*   BUN 16   CREATININE 1.2   CALCIUM 9.0   , CMP   Recent Labs  Lab 08/22/17  0835      K 4.0   *   CO2 19*   *   BUN 16   CREATININE 1.2   CALCIUM 9.0   PROT 7.0   ALBUMIN 2.8*   BILITOT 0.3   ALKPHOS 99   AST 15   ALT 15   ANIONGAP 12   ESTGFRAFRICA >60.0   EGFRNONAA 53.7*   , CBC   Recent Labs  Lab 08/22/17  0836   WBC 9.89   HGB 10.7*   HCT 33.7*      , INR   Lab Results   Component Value Date    INR 1.1 08/09/2017    INR 1.0 07/01/2017    INR 1.0 09/14/2011   , Lipid Panel   Lab Results   Component Value Date    CHOL 277 (H) 12/23/2014    HDL 56 12/23/2014    LDLCALC 198.2 (H) 12/23/2014    TRIG 114 12/23/2014    CHOLHDL 20.2 12/23/2014   , Troponin   Recent Labs  Lab 08/16/17  2331   TROPONINI 0.094*   , A1C: No results for input(s): HGBA1C in the last 4320 hours. and All labs within the past 24 hours have been reviewed  Blood Culture: NGTD  Radiology: X-Ray: CXR: X-Ray Chest 1 View (CXR):   Results for orders placed or performed during the hospital encounter of 08/16/17   X-Ray Chest 1 View    Narrative    Comparison is made to 7/3/17.    Postoperative changes again noted in the thorax, including a valvular prosthesis.  Heart size is normal, as is the appearance of the pulmonary vascularity.  Lung zones appear adequately aerated allowing for a poor inspiratory depth level, and are free of significant airspace consolidation or volume loss.  No pleural fluid of any significant volume is seen on either side.  No pneumothorax.  Previously  present endotracheal tube is obviously no longer present.    Impression     Allowing for a poor inspiratory depth level, no significant intrathoracic abnormality is appreciated.  No significant detrimental interval change in the appearance the chest since 7/3/17.          Electronically signed by: Manny Brower MD  Date:     08/17/17  Time:    05:53     and X-Ray Chest PA and Lateral (CXR): No results found for this visit on 08/16/17.    Pending Diagnostic Studies:     None        Final Active Diagnoses:    Diagnosis Date Noted POA    PRINCIPAL PROBLEM:  ESTHER (acute kidney injury) [N17.9] 08/17/2017 Yes    Complicated UTI (urinary tract infection) [N39.0] 08/09/2017 Yes    Paroxysmal atrial fibrillation [I48.0] 07/02/2017 Yes    Urinary retention [R33.9] 08/09/2017 Yes    Essential hypertension [I10]  Yes    Vascular dementia without behavioral disturbance [F01.50] 10/09/2012 Yes    Partial symptomatic epilepsy with complex partial seizures, not intractable, without status epilepticus [G40.209]  Yes    Hematuria [R31.9] 08/17/2017 Yes    Acquired hypothyroidism [E03.9] 02/20/2013 Yes    Benign non-nodular prostatic hyperplasia without lower urinary tract symptoms [N40.0]  Yes      Problems Resolved During this Admission:    Diagnosis Date Noted Date Resolved POA    Acute saddle pulmonary embolism without acute cor pulmonale [I26.92] 07/02/2017 08/22/2017 Yes      * ESTHER (acute kidney injury)    Resolved. Likely pre-renal in setting of poor PO intake in NH. Improved with fluids.               Complicated UTI (urinary tract infection)    Completed 10 day course of keflex for Citrobacter UTI.           Paroxysmal atrial fibrillation    AZ 80, irregular rhythm  Apixaban restarted        Urinary retention    - Barton's in place for urinary retention.    -Attempted voiding trial today which patient failed.  *barton removed at 3 am, patient voided around 150ml and post-voiding bladder scan showed ~350ml  -Barton  re-inserted    Discharged to SNF.          Essential hypertension    BP 97/52  Continue coreg 12.5mg BID.        Vascular dementia without behavioral disturbance    Continue mematine  Seroquel 50mg once daily as needed.          Partial symptomatic epilepsy with complex partial seizures, not intractable, without status epilepticus    Continue Keppra and phenobarbitol        Acquired hypothyroidism    Continue levothyroxine        Benign non-nodular prostatic hyperplasia without lower urinary tract symptoms                  Discharged Condition: fair    Disposition: Skilled Nursing Facility    Follow Up:  Follow-up Information     [facility].    Specialties:  Nursing Home Agency, SNF Agency  Why:  SNF  Contact information:  [address] [place] [address] [phone]                 Patient Instructions:     Diet general     Diet general     Diet general     Diet general     Activity as tolerated     Call MD for:  temperature >100.4     Call MD for:  persistent nausea and vomiting or diarrhea     Call MD for:  severe uncontrolled pain     Call MD for:  difficulty breathing or increased cough     Call MD for:  severe persistent headache     Call MD for:  worsening rash     Call MD for:  persistent dizziness, light-headedness, or visual disturbances     Call MD for:  increased confusion or weakness     Activity as tolerated     Call MD for:  temperature >100.4     Call MD for:  persistent nausea and vomiting or diarrhea     Call MD for:  severe uncontrolled pain     Call MD for:  difficulty breathing or increased cough     Call MD for:  worsening rash     Call MD for:  persistent dizziness, light-headedness, or visual disturbances     Call MD for:  increased confusion or weakness     Activity as tolerated     Call MD for:  temperature >100.4     Call MD for:  persistent nausea and vomiting or diarrhea     Call MD for:  severe uncontrolled pain     Call MD for:  difficulty breathing or increased  cough     Call MD for:  severe persistent headache     Call MD for:  worsening rash     Call MD for:  persistent dizziness, light-headedness, or visual disturbances     Call MD for:  increased confusion or weakness       Medications:  Reconciled Home Medications:   Current Discharge Medication List      START taking these medications    Details   finasteride (PROSCAR) 5 mg tablet Take 1 tablet (5 mg total) by mouth once daily.      quetiapine 50 mg oral tb24 (SEROQUEL XR) 50 mg Tb24 Take 1 tablet (50 mg total) by mouth once as needed (Agitation).         CONTINUE these medications which have CHANGED    Details   apixaban 5 mg Tab Take 1 tablet (5 mg total) by mouth 2 (two) times daily.      atorvastatin (LIPITOR) 40 MG tablet Take 1 tablet (40 mg total) by mouth once daily.      levothyroxine (SYNTHROID) 150 MCG tablet Take 1 tablet (150 mcg total) by mouth before breakfast.      memantine (NAMENDA) 10 MG Tab Take 1 tablet (10 mg total) by mouth 2 (two) times daily.      senna-docusate 8.6-50 mg (PERICOLACE) 8.6-50 mg per tablet Take 1 tablet by mouth 2 (two) times daily as needed for Constipation.      tamsulosin (FLOMAX) 0.4 mg Cp24 Take 1 capsule (0.4 mg total) by mouth once daily.         CONTINUE these medications which have NOT CHANGED    Details   acetaminophen (TYLENOL) 650 MG TbSR Take 650 mg by mouth every 6 (six) hours as needed (mild to moderate pain).      calcium carbonate (OS-ROSA) 500 mg calcium (1,250 mg) tablet Take 2 tablets (1,000 mg total) by mouth once daily.  Refills: 0      carvedilol (COREG) 25 MG tablet Take 1 tablet (25 mg total) by mouth 2 (two) times daily.      ergocalciferol (ERGOCALCIFEROL) 50,000 unit Cap Take 1 capsule (50,000 Units total) by mouth Every Friday.      gabapentin (NEURONTIN) 800 MG tablet Take 1 tablet (800 mg total) by mouth 3 (three) times daily.  Qty: 270 tablet, Refills: 1    Associated Diagnoses: Seizure disorder      levetiracetam (KEPPRA) 750 MG Tab Take 1  tablet (750 mg total) by mouth 2 (two) times daily.  Qty: 180 tablet, Refills: 1    Associated Diagnoses: Seizure disorder      multivitamin (ONE DAILY MULTIVITAMIN) per tablet Take 1 tablet by mouth once daily.      phenobarbital (LUMINAL) 32.4 MG tablet Take 32.4 mg by mouth 3 (three) times daily.      zinc sulfate 220 mg Tab Take 1 tablet by mouth once daily.         STOP taking these medications       cephALEXin (KEFLEX) 500 MG capsule Comments:   Reason for Stopping:             Time spent on the discharge of patient: 20 minutes    HOS POC IP DISCHARGE SUMMARY    Scott Ramos MD  Department of Hospital Medicine  [facility]

## 2017-08-23 NOTE — PLAN OF CARE
Pt's daughter has already signed admission paperwork to [facility]. Kalpesh requested PPD, 142, Passr and updated SNF orders.     Transfer to [facility] pending insurance authorization

## 2017-08-23 NOTE — PLAN OF CARE
Problem: Patient Care Overview  Goal: Plan of Care Review  Outcome: Ongoing (interventions implemented as appropriate)  Pt oriented to self only. VSS. No acute events overnight. Barton removed for voiding trial per order. 35mL sterile water removed from balloon prior to removal. Multiple small clots noted around catheter. 150mL cloudy, yellow urine noted to barton bag. Pt due to void, has not yet. Bladder scanned prior to barton removal, 0mL. Will bladder scan again at end of shift. Camera in room for patient's safety. Pt remained free of falls/trauma/injury throughout shift. Will continue to monitor and reassess.

## 2017-08-23 NOTE — PT/OT/SLP PROGRESS
Physical Therapy  Treatment    Virgil Melo Jr.   MRN: [id number]   Admitting Diagnosis: ESTHER (acute kidney injury)    PT Received On: 08/23/17  PT Start Time: 1436     PT Stop Time: 1459    PT Total Time (min): 23 min       Billable Minutes:  Gait Training8 and Therapeutic Activity 15    Treatment Type: Treatment  PT/PTA: PTA     PTA Visit Number: 1       General Precautions: Standard, fall, hearing impaired  Orthopedic Precautions: N/A   Braces: N/A    Do you have any cultural, spiritual, Catholic conflicts, given your current situation?: none stated    Subjective:  Communicated with nursing prior to session.  Pt agreed to work with therapy.     Pain/Comfort  Pain Rating 1: 0/10  Pain Rating Post-Intervention 1: 0/10    Objective:   Patient found with: barton catheter    Functional Mobility:  Bed Mobility:   Supine to Sit: Minimum Assistance, With side rail (w/ HOB elevated )  Sit to Supine: Stand by Assistance (w/ HOB flat)    Transfers:  Sit <> Stand Assistance: Minimum Assistance (x4 trials)  Sit <> Stand Assistive Device: Rolling Walker    Gait:   Gait Distance:  (x2 trials of ~80 feet each trial with short seated rest break between trials.)  Assistance 1: Minimum assistance  Gait Assistive Device: Rolling walker  Gait Pattern: reciprocal  Gait Deviation(s): decreased luis, decreased velocity of limb motion, decreased step length    Balance:   Static Sit: GOOD-: Takes MODERATE challenges from all directions but inconsistently  Dynamic Sit: FAIR+: Maintains balance through MINIMAL excursions of active trunk motion  Static Stand: POOR+: Needs MINIMAL assist to maintain  Dynamic stand: POOR: N/A     Therapeutic Activities and Exercises:  B LE therex x15 reps with assistance as needed: AP, LAQ, and Hip Flexion     AM-PAC 6 CLICK MOBILITY  How much help from another person does this patient currently need?   1 = Unable, Total/Dependent Assistance  2 = A lot, Maximum/Moderate Assistance  3 = A little,  Minimum/Contact Guard/Supervision  4 = None, Modified [place]/Independent    Turning over in bed (including adjusting bedclothes, sheets and blankets)?: 3  Sitting down on and standing up from a chair with arms (e.g., wheelchair, bedside commode, etc.): 3  Moving from lying on back to sitting on the side of the bed?: 3  Moving to and from a bed to a chair (including a wheelchair)?: 3  Need to walk in hospital room?: 2  Climbing 3-5 steps with a railing?: 2  Total Score: 16    AM-PAC Raw Score CMS G-Code Modifier Level of Impairment Assistance   6 % Total / Unable   7 - 9 CM 80 - 100% Maximal Assist   10 - 14 CL 60 - 80% Moderate Assist   15 - 19 CK 40 - 60% Moderate Assist   20 - 22 CJ 20 - 40% Minimal Assist   23 CI 1-20% SBA / CGA   24 CH 0% Independent/ Mod I     Patient left supine with all lines intact, call button in reach, bed alarm on and family present.    Assessment:  Virgil Melo Jr. is a 88 y.o. male with a medical diagnosis of ESTHER (acute kidney injury) and presents with all deficits noted below. Pt tolerated treatment well, and will continue to benefit from skilled PT services at this time. Continue with PT POC as indicated.    Rehab identified problem list/impairments: Rehab identified problem list/impairments: weakness, impaired endurance, impaired self care skills, impaired functional mobilty, impaired balance, decreased lower extremity function, decreased safety awareness    Rehab potential is good.    Activity tolerance: Good    Discharge recommendations: Discharge Facility/Level Of Care Needs: nursing facility, skilled     Barriers to discharge: Barriers to Discharge: None    Equipment recommendations: Equipment Needed After Discharge: none     GOALS:    Physical Therapy Goals        Problem: Physical Therapy Goal    Goal Priority Disciplines Outcome Goal Variances Interventions   Physical Therapy Goal     PT/OT, PT Ongoing (interventions implemented as appropriate)      Description:  Goals to be met by: 17    Patient will increase functional independence with mobility by performin. Supine to sit with MInimal Assistance  2. Sit to supine with MInimal Assistance. MET 2017  3. Sit to stand transfer with Stand-by Assistance  4. Bed to chair transfer with Contact Guard Assistance using Rolling Walker  5. Gait  x 100 feet with Contact Guard Assistance using Rolling Walker.   6. Lower extremity exercise program x20 reps per handout, with supervision                       PLAN:    Patient to be seen 4 x/week  to address the above listed problems via gait training, therapeutic activities, therapeutic exercises  Plan of Care expires: 17  Plan of Care reviewed with: patient, daughter         Natalia Redmond, PTA  2017

## 2017-08-23 NOTE — PROGRESS NOTES
Scanned bladder @ 10:10am and found approx. 350ccs of urine on scan. Order received to replace barton catheter. Standard barton cath ok to use. Updated Dr. Crooks of results of scan and that barton was placed.

## 2017-08-24 NOTE — PLAN OF CARE
[facility]     Department of Hospital Medicine     [address] [place] [address]     [phone] after hours  [phone] fax       NURSING HOME ORDERS    08/24/2017    Admit to Nursing Home:       Skilled Bed                                                 Diagnoses:  Active Hospital Problems    Diagnosis  POA    *ESTHER (acute kidney injury) [N17.9]  Yes    Hematuria [R31.9]  Yes    Urinary retention [R33.9]  Yes    Complicated UTI (urinary tract infection) [N39.0]  Yes    Paroxysmal atrial fibrillation [I48.0]  Yes    Acquired hypothyroidism [E03.9]  Yes    Vascular dementia without behavioral disturbance [F01.50]  Yes    Essential hypertension [I10]  Yes    Partial symptomatic epilepsy with complex partial seizures, not intractable, without status epilepticus [G40.209]  Yes    Benign non-nodular prostatic hyperplasia without lower urinary tract symptoms [N40.0]  Yes      Resolved Hospital Problems    Diagnosis Date Resolved POA    Acute saddle pulmonary embolism without acute cor pulmonale [I26.92] 08/22/2017 Yes       Patient is homebound due to:  ESTHER (acute kidney injury)    Allergies:  Review of patient's allergies indicates:   Allergen Reactions    Penicillins Hives     Tolerated ceftriaxone and cephalexin       Vitals:     Every shift (Skilled Nursing patients)    Diet: Regular diet    Acitivities:    - Up in a chair each morning as tolerated   - Ambulate with assistance to bathroom   - Scheduled walks once each shift (every 8 hours)        LABS:  Per facility protocol    Nursing Precautions:    - Aspiration precautions:             - Total assistance with meals            -  Upright 90 degrees befor during and after meals             -  Suction at bedside          - Fall precautions per nursing home protocol   - Decubitus precautions:        -  for positioning   - Pressure reducing foam mattress   - Turn patient every two hours. Use  wedge pillows to anchor patient    CONSULTS:      Physical Therapy to evaluate and treat     Occupational Therapy to evaluate and treat     Speech Therapy  to evaluate and treat     Nutrition to evaluate and recommend diet        MISCELLANEOUS CARE:       Archer Care: Empty Archer bag every shift.  Change Archer every month        Medications: Discontinue all previous medication orders, if any. See new list below.     Virgil Melo Jr.   Home Medication Instructions NEETA:[id number]    Printed on:08/24/17 1047   Medication Information                      acetaminophen (TYLENOL) 650 MG TbSR  Take 650 mg by mouth every 6 (six) hours as needed (mild to moderate pain).             apixaban 5 mg Tab  Take 1 tablet (5 mg total) by mouth 2 (two) times daily.             atorvastatin (LIPITOR) 40 MG tablet  Take 1 tablet (40 mg total) by mouth once daily.             calcium carbonate (OS-ROSA) 500 mg calcium (1,250 mg) tablet  Take 2 tablets (1,000 mg total) by mouth once daily.             carvedilol (COREG) 25 MG tablet  Take 1 tablet (25 mg total) by mouth 2 (two) times daily.             ergocalciferol (ERGOCALCIFEROL) 50,000 unit Cap  Take 1 capsule (50,000 Units total) by mouth Every Friday.             finasteride (PROSCAR) 5 mg tablet  Take 1 tablet (5 mg total) by mouth once daily.             gabapentin (NEURONTIN) 800 MG tablet  Take 1 tablet (800 mg total) by mouth 3 (three) times daily.             levetiracetam (KEPPRA) 750 MG Tab  Take 1 tablet (750 mg total) by mouth 2 (two) times daily.             levothyroxine (SYNTHROID) 150 MCG tablet  Take 1 tablet (150 mcg total) by mouth before breakfast.             memantine (NAMENDA) 10 MG Tab  Take 1 tablet (10 mg total) by mouth 2 (two) times daily.             multivitamin (ONE DAILY MULTIVITAMIN) per tablet  Take 1 tablet by mouth once daily.             phenobarbital (LUMINAL) 32.4 MG tablet  Take 32.4 mg by mouth 3 (three) times daily.             quetiapine  50 mg oral tb24 (SEROQUEL XR) 50 mg Tb24  Take 1 tablet (50 mg total) by mouth once as needed (Agitation).             senna-docusate 8.6-50 mg (PERICOLACE) 8.6-50 mg per tablet  Take 1 tablet by mouth 2 (two) times daily as needed for Constipation.             tamsulosin (FLOMAX) 0.4 mg Cp24  Take 1 capsule (0.4 mg total) by mouth once daily.             zinc sulfate 220 mg Tab  Take 1 tablet by mouth once daily.                   _________________________________  PROSPER Townsend  08/24/2017

## 2017-08-24 NOTE — PROGRESS NOTES
[facility] Hospital Medicine  Progress Note    Patient Name: Virgil Melo Jr.  MRN: [id number]  Patient Class: OP- Observation   Admission Date: 8/16/2017  Length of Stay: 0 days  Attending Physician: Ashley Albarran MD  Primary Care Provider: Luis A Milton MD    [facility] Medicine Team: [facility]OSP MED 1 Scott Ramos MD    Subjective:     Principal Problem:ESTHER (acute kidney injury)    HPI:  Mr Melo is an 89 y/o M with a history of epilepsy, vascular dementia (on memantine), Afib and pulmonary embolism (on eliquis), recent hip fracture with surgical repair on 7/3, recent UTI w/ bactrim, and a recent admission for altered mental status presents to the ED with hematuria.    Patient had barton placed during last stay (8/9 to 8/14) for obstructive uropathy and per daughter, nursing staff noted that he was handling barton frequently.  Has not had long-term barton before.  Today staff noticed gross blood in barton bag, sent to ED.  Patient unable to provide history. Daughter reported patient is at baseline.  Reports combative with staff during insertion of barton.    On his previous admission, patient was noted to have suprapubic tenderness and palpable bladder, with additional concern for UTI. UTI was treated with ceftriaxone. Barton helped to resolve retention with 1675 cc's drained. Noted to have concurrent ESTHER with 1.3 thought to be obstructive in etiology.     I have obtained history from ED records. Patient is altered.     Hospital Course:  Upon ED admission, bolused with 1 L fluids, Cefepime and Vancomycin.     08/18: NAEO. Vitals stable.  Oriented only to person. No further bleeding from urethra, Barton clear.    08/19: NAEO. Vitals stable.  Oriented to person and place. No further bleeding.    08/20: NAEO. Vitals stable.  Oriented x3. Pending discharge tomm.     08/21: NAEO. Vitals stable.  Oriented x3. Pending discharge on 08/23.    08/23: NAEO. Vitals stable. Barton removed today at 3 am  for voiding trial.  Patient failed voiding trial, Archer re-inserted.  * Post-voiding scan - ~350ml    08/24: NAEO. Vitals stable.  Reportedly tugging at his catheter this AM.  No blood around urethral meatus.    Interval History: NAEO. Vitals stable.      Review of Systems   Constitutional: Negative for chills and fever.   HENT: Positive for hearing loss.    Respiratory: Negative for shortness of breath.    Cardiovascular: Negative for chest pain.   Gastrointestinal: Negative for diarrhea and vomiting.   Genitourinary: Negative for difficulty urinating.   Skin:        No skin breakdown.   Neurological: Negative for seizures.   Psychiatric/Behavioral: Negative for agitation and behavioral problems.     Objective:     Vital Signs (Most Recent):  Temp: 97.7 °F (36.5 °C) (08/24/17 0342)  Pulse: 71 (08/24/17 0342)  Resp: 16 (08/24/17 0342)  BP: (!) 147/76 (08/24/17 0342)  SpO2: (!) 93 % (08/24/17 0342) Vital Signs (24h Range):  Temp:  [97.6 °F (36.4 °C)-98.6 °F (37 °C)] 97.7 °F (36.5 °C)  Pulse:  [67-72] 71  Resp:  [16-18] 16  SpO2:  [92 %-95 %] 93 %  BP: (107-147)/(55-76) 147/76     Weight: 84 kg (185 lb 1.6 oz)  Body mass index is 25.1 kg/m².    Intake/Output Summary (Last 24 hours) at 08/24/17 0855  Last data filed at 08/24/17 0553   Gross per 24 hour   Intake              720 ml   Output              685 ml   Net               35 ml      Physical Exam   Constitutional: No distress.   Eyes: EOM are normal.   Cardiovascular: Normal rate, regular rhythm, normal heart sounds and intact distal pulses.    Pulmonary/Chest: Effort normal and breath sounds normal.   Abdominal: Soft. Bowel sounds are normal.   Neurological: He is alert.   Mental status waxes and wanes.   Nursing note and vitals reviewed.      Significant Labs:   A1C: No results for input(s): HGBA1C in the last 4320 hours.  ABGs: No results for input(s): PH, PCO2, HCO3, POCSATURATED, BE, TOTALHB, COHB, METHB, O2HB, POCFIO2 in the last 48 hours.  Bilirubin:    Recent Labs  Lab 08/09/17  0718 08/16/17  2139 08/20/17  0936 08/22/17  0835   BILITOT 0.3 0.3 0.3 0.3     Blood Culture: No results for input(s): LABBLOO in the last 48 hours.  BMP: No results for input(s): GLU, NA, K, CL, CO2, BUN, CREATININE, CALCIUM, MG in the last 48 hours.  CBC: No results for input(s): WBC, HGB, HCT, PLT in the last 48 hours.  CMP: No results for input(s): NA, K, CL, CO2, GLU, BUN, CREATININE, CALCIUM, PROT, ALBUMIN, BILITOT, ALKPHOS, AST, ALT, ANIONGAP, EGFRNONAA in the last 48 hours.    Invalid input(s): ESTGFAFRICA  Cardiac Markers: No results for input(s): CKMB, MYOGLOBIN, BNP, TROPISTAT in the last 48 hours.  Coagulation: No results for input(s): INR, APTT in the last 48 hours.    Invalid input(s): PT  Lactic Acid: No results for input(s): LACTATE in the last 48 hours.  Lipase: No results for input(s): LIPASE in the last 48 hours.  Lipid Panel: No results for input(s): CHOL, HDL, LDLCALC, TRIG, CHOLHDL in the last 48 hours.  Magnesium: No results for input(s): MG in the last 48 hours.  Pathology Results  (Last 10 years)               06/28/12 0000  Tissue Specimen to Pathology Final result        POCT Glucose: No results for input(s): POCTGLUCOSE in the last 48 hours.  Prealbumin: No results for input(s): PREALBUMIN in the last 48 hours.  Respiratory Culture: No results for input(s): GSRESP, RESPIRATORYC in the last 48 hours.  Troponin: No results for input(s): TROPONINI in the last 48 hours.  TSH:   Recent Labs  Lab 08/09/17  0342   TSH 1.419     Urine Culture: No results for input(s): LABURIN in the last 48 hours.  Urine Studies: No results for input(s): COLORU, APPEARANCEUA, PHUR, SPECGRAV, PROTEINUA, GLUCUA, KETONESU, BILIRUBINUA, OCCULTUA, NITRITE, UROBILINOGEN, LEUKOCYTESUR, RBCUA, WBCUA, BACTERIA, SQUAMEPITHEL, HYALINECASTS in the last 48 hours.    Invalid input(s): MAGGY  All pertinent labs within the past 24 hours have been reviewed.    Significant Imaging: I have reviewed  all pertinent imaging results/findings within the past 24 hours.    Assessment/Plan:      * ESTHER (acute kidney injury)    Resolved. Likely pre-renal in setting of poor PO intake in NH. Improved with fluids.               Complicated UTI (urinary tract infection)    Completed 10 day course of keflex for Citrobacter UTI.           Paroxysmal atrial fibrillation    SD 80, irregular rhythm  Apixaban restarted        Urinary retention    - Barton's in place for urinary retention.    -Attempted voiding trial today which patient failed.  *barton removed at 3 am, patient voided around 150ml and post-voiding bladder scan showed ~350ml  -Barton re-inserted    Discharged to SNF.          Essential hypertension    BP 97/52  Continue coreg 12.5mg BID.        Vascular dementia without behavioral disturbance    Continue mematine  Seroquel 50mg once daily as needed.          Partial symptomatic epilepsy with complex partial seizures, not intractable, without status epilepticus    Continue Keppra and phenobarbitol        Acquired hypothyroidism    Continue levothyroxine        Benign non-nodular prostatic hyperplasia without lower urinary tract symptoms                VTE Risk Mitigation         Ordered     apixaban tablet 5 mg  2 times daily     Route:  Oral        08/18/17 6196              Scott Ramos MD  Department of Hospital Medicine   [facility]

## 2017-08-24 NOTE — PLAN OF CARE
Problem: Patient Care Overview  Goal: Plan of Care Review  Outcome: Ongoing (interventions implemented as appropriate)  Pt oriented to self and time intermittently. Barton with good output, 250mL first half of shift, will collect and document at end. Blood noted around penis from patient pulling at barton. No BMs overnight. Encouraged oral intake. PPD placed to R anterior FA during AM shift 8/23. Pt repositions self independently throughout night. Camera sitter in room for patient's safety. Attempted to get OOB once. No falls/trauma/injury during shift. Bed alarm on, close to nurse's station, bed low and locked. Will continue to monitor and bladder scan again.

## 2017-08-24 NOTE — SUBJECTIVE & OBJECTIVE
Interval History: NAEO. Vitals stable.      Review of Systems   Constitutional: Negative for chills and fever.   HENT: Positive for hearing loss.    Respiratory: Negative for shortness of breath.    Cardiovascular: Negative for chest pain.   Gastrointestinal: Negative for diarrhea and vomiting.   Genitourinary: Negative for difficulty urinating.   Skin:        No skin breakdown.   Neurological: Negative for seizures.   Psychiatric/Behavioral: Negative for agitation and behavioral problems.     Objective:     Vital Signs (Most Recent):  Temp: 97.7 °F (36.5 °C) (08/24/17 0342)  Pulse: 71 (08/24/17 0342)  Resp: 16 (08/24/17 0342)  BP: (!) 147/76 (08/24/17 0342)  SpO2: (!) 93 % (08/24/17 0342) Vital Signs (24h Range):  Temp:  [97.6 °F (36.4 °C)-98.6 °F (37 °C)] 97.7 °F (36.5 °C)  Pulse:  [67-72] 71  Resp:  [16-18] 16  SpO2:  [92 %-95 %] 93 %  BP: (107-147)/(55-76) 147/76     Weight: 84 kg (185 lb 1.6 oz)  Body mass index is 25.1 kg/m².    Intake/Output Summary (Last 24 hours) at 08/24/17 0855  Last data filed at 08/24/17 0553   Gross per 24 hour   Intake              720 ml   Output              685 ml   Net               35 ml      Physical Exam   Constitutional: No distress.   Eyes: EOM are normal.   Cardiovascular: Normal rate, regular rhythm, normal heart sounds and intact distal pulses.    Pulmonary/Chest: Effort normal and breath sounds normal.   Abdominal: Soft. Bowel sounds are normal.   Neurological: He is alert.   Mental status waxes and wanes.   Nursing note and vitals reviewed.      Significant Labs:   A1C: No results for input(s): HGBA1C in the last 4320 hours.  ABGs: No results for input(s): PH, PCO2, HCO3, POCSATURATED, BE, TOTALHB, COHB, METHB, O2HB, POCFIO2 in the last 48 hours.  Bilirubin:   Recent Labs  Lab 08/09/17  0718 08/16/17  2139 08/20/17  0936 08/22/17  0835   BILITOT 0.3 0.3 0.3 0.3     Blood Culture: No results for input(s): LABBLOO in the last 48 hours.  BMP: No results for input(s): GLU,  NA, K, CL, CO2, BUN, CREATININE, CALCIUM, MG in the last 48 hours.  CBC: No results for input(s): WBC, HGB, HCT, PLT in the last 48 hours.  CMP: No results for input(s): NA, K, CL, CO2, GLU, BUN, CREATININE, CALCIUM, PROT, ALBUMIN, BILITOT, ALKPHOS, AST, ALT, ANIONGAP, EGFRNONAA in the last 48 hours.    Invalid input(s): ESTGFAFRICA  Cardiac Markers: No results for input(s): CKMB, MYOGLOBIN, BNP, TROPISTAT in the last 48 hours.  Coagulation: No results for input(s): INR, APTT in the last 48 hours.    Invalid input(s): PT  Lactic Acid: No results for input(s): LACTATE in the last 48 hours.  Lipase: No results for input(s): LIPASE in the last 48 hours.  Lipid Panel: No results for input(s): CHOL, HDL, LDLCALC, TRIG, CHOLHDL in the last 48 hours.  Magnesium: No results for input(s): MG in the last 48 hours.  Pathology Results  (Last 10 years)               06/28/12 0000  Tissue Specimen to Pathology Final result        POCT Glucose: No results for input(s): POCTGLUCOSE in the last 48 hours.  Prealbumin: No results for input(s): PREALBUMIN in the last 48 hours.  Respiratory Culture: No results for input(s): GSRESP, RESPIRATORYC in the last 48 hours.  Troponin: No results for input(s): TROPONINI in the last 48 hours.  TSH:   Recent Labs  Lab 08/09/17  0342   TSH 1.419     Urine Culture: No results for input(s): LABURIN in the last 48 hours.  Urine Studies: No results for input(s): COLORU, APPEARANCEUA, PHUR, SPECGRAV, PROTEINUA, GLUCUA, KETONESU, BILIRUBINUA, OCCULTUA, NITRITE, UROBILINOGEN, LEUKOCYTESUR, RBCUA, WBCUA, BACTERIA, SQUAMEPITHEL, HYALINECASTS in the last 48 hours.    Invalid input(s): WRIGHTSUR  All pertinent labs within the past 24 hours have been reviewed.    Significant Imaging: I have reviewed all pertinent imaging results/findings within the past 24 hours.

## 2017-08-24 NOTE — PLAN OF CARE
Updated orders were sent to [facility]     MICAH obtained call report information from Kalpesh. Attending nurse is to call [phone], ask for nurse frannie Grey going to room 6B.     Attending nurse Vicky Rn aware of call report information and knows to locate in this note.     MICAH placed call to hospitals at [phone], spoke to dispatcher Cathy who arranged wheelchair transport for 1:30pm

## 2017-08-31 NOTE — PHYSICIAN QUERY
PT Name: Virgil Melo Jr.  MR #: [id number]     Physician Query Form - Diagnosis Clarification      CDS/: Monica Garibay   RN,BSN,CDI            Contact information: EXT. [phone]    This form is a permanent document in the medical record.     Query Date: August 31, 2017    By submitting this query, we are merely seeking further clarification of documentation.  Please utilize your independent clinical judgment when addressing the question(s) below.     The medical record contains the following:      Findings Supporting Clinical Information Location in Medical Record       Sepsis        Less concerned for sepsis.    No growth after 5 days    recent alteration in mental status may be due to septic encephalopathy   - likely septic in nature 2/2 to UTI      Vancommyocin 1,250mg in dextrose 5%250ml IVPB every 24 hours   08/09/17 ED Provider Note    08/09/17 Bld. Cx      08/09/17 H&P        08/09-----08/13 MAR     Please clarify if the _____Sepsis ______________________ diagnosis has been:    [x  ] Ruled In  [  ] Ruled In, Now Resolved  [  ] Resolved Prior to My Assessment  [  ] Ruled Out  [  ] Clinically insignificant  [  ] Clinically undetermined  [  ] Other/Clarification of findings (please specify)_______________________________    Please document in your progress notes daily for the duration of treatment, until resolved, and include in your discharge summary.

## 2017-08-31 NOTE — PROGRESS NOTES
08/31/2017  (1st attempt)  RN-KETTY attempted to contact patient to screen for OPCM. During chart review, discovered that this patient was living at [facility]. He was admitted to the hospital with a hip fracture on 7/1/17 and was discharged on 7/7/17 to [facility]. He was admitted to the hospital 2 additional times in August and may have been discharged to [facility] at 2nd discharge. I attempted to reach patient's daughter, Marta Sanches, to determine if patient was now a permanent resident of [facility] or is he has returned to [facility]. Left message for Marta to return call. Contacted [facility] and verified that patient is currently in the SNF unit. Will close case today as patient's needs are being met by others.

## 2017-09-21 PROBLEM — N30.01 ACUTE CYSTITIS WITH HEMATURIA: Status: ACTIVE | Noted: 2017-01-01

## 2017-09-21 PROBLEM — Z51.5 PALLIATIVE CARE ENCOUNTER: Status: RESOLVED | Noted: 2017-01-01 | Resolved: 2017-01-01

## 2017-09-21 PROBLEM — R65.21 SEPTIC SHOCK: Status: RESOLVED | Noted: 2017-01-01 | Resolved: 2017-01-01

## 2017-09-21 PROBLEM — R41.82 ALTERED MENTAL STATUS: Status: ACTIVE | Noted: 2017-01-01

## 2017-09-21 PROBLEM — Z71.89 GOALS OF CARE, COUNSELING/DISCUSSION: Status: ACTIVE | Noted: 2017-01-01

## 2017-09-21 PROBLEM — G93.41 SEPTIC ENCEPHALOPATHY: Status: RESOLVED | Noted: 2017-01-01 | Resolved: 2017-01-01

## 2017-09-21 PROBLEM — N30.01 ACUTE CYSTITIS WITH HEMATURIA: Status: RESOLVED | Noted: 2017-01-01 | Resolved: 2017-01-01

## 2017-09-21 PROBLEM — A41.9 SEPTIC SHOCK: Status: ACTIVE | Noted: 2017-01-01

## 2017-09-21 PROBLEM — R65.21 SEPTIC SHOCK: Status: ACTIVE | Noted: 2017-01-01

## 2017-09-21 PROBLEM — A41.9 SEPTIC SHOCK: Status: RESOLVED | Noted: 2017-01-01 | Resolved: 2017-01-01

## 2017-09-21 PROBLEM — Z51.5 PALLIATIVE CARE ENCOUNTER: Status: ACTIVE | Noted: 2017-01-01

## 2017-09-21 NOTE — SIGNIFICANT EVENT
Called to bedside by nursing, patient with unrecordable heart rate or bp    At bedside are family members including patient's daughter.     On examination, patient unresponsive no spontaneous breath or heart sounds heard on auscultation, pupils fixed and non-reactive to light.     Time of Death: 17:40 pm.  Patient's daughter at the bedside notified.  They declined autopsy.     In review of Reportable Case criteria, patient meets criteria of Death Within 23 hours of admission.        Alfred Zapata M.D.  Attending Physician  [facility] Medicine Dept.  Pager: [phone]  Spectralink -[phone]

## 2017-09-21 NOTE — NURSING
Unable to obtain B/P manually or automatically  Unable to obtain radial or apical heart rate. MD paged.Family at bedside.

## 2017-09-21 NOTE — CONSULTS
[facility]  Palliative Medicine  Consult Note    Patient Name: Virgil Melo Jr.  MRN: [id number]  Admission Date: 2017  Hospital Length of Stay: 0 days  Code Status: DNR   Attending Provider: Daly Monsalve MD  Consulting Provider: ROXANE Almaraz  Primary Care Physician: Luis A Milton MD  Principal Problem:<principal problem not specified>    Patient information was obtained from relative(s) and ER records.      Inpatient consult to Palliative Care  Consult performed by: BRYN ASHER  Consult ordered by: RADHA OSWALD        Assessment/Plan:     Palliative care encounter    Spoke to Dr. Monsalve. Pt has urosepsis, Multi- system organ failure, and End stage Dementia. Pt on face mask. Vital stable. Per MD, pt DNR and family interested in inpt hospice care.   Per MD, pt on antibiotics IV at this time. Pt will be admitted to IM.  This SENDY and Natalia Jo LCSW met with pt's daughter and sister-in-law.  Spoke to pt's daughter, Marta, who is at bedside and pt's sister-in-law. Wife .Daughter next of kin. Per daughter, her goals at this time is comfort.   Per Daughter, she was told there are inpt hospices pt can go to. Education done with daughter on hospice education, and inpt hospice. Per daughter, she is interested in hospice care at [place] due to location.   Let daughter know pt may be admitted to IM Per ER MD. Explained to daughter, that hospice rep would need to come and meet with daughter to do paper-work.     Symptom management:  Per daughter, pt appears comfortable.    Would have available Morphine 1 mg IVP q hrs prn pain/ dyspnea.   Ativan 0.5 mg q 30 minutes prn agitation.   Glycopyrrolate 0.2 mg  Tid prn secretions.     After meeting with pt, spoke to Dr. Monsalve about conversation with pt. Pt to go to IM floor and then can d/c to inpt hospice.             Called and spoke to Mariela to have  come to [facility].   Thank you for your consult. I will  follow-up with patient. Please contact us if you have any additional questions.    Subjective:     HPI:   Pt is an 89 y/o male with a PMH of HTN, A fib with vascular dementia (IVC filter, anticoagulated on Eliquis), aortic valve replacement, epilepsy, obstructive uropathy requiring barton catheterization with recent UTI, hypothyroid, hip fracture s/p repair 7/3/17 who presents via EMS with altered mental status. Per chart review,  EMS reports the SNF called for altered mental status discovered early this morning, exact time unknown. Patient had pulled his barton yesterday afternoon. He was found to be unresponsive this morning with hypotension and hypoxia. Pupils were pinpoint and Narcan was given en route without response. Patient was given 250cc fluid bolus and Duoneb in route with moderate improvement in vitals. History is otherwise limited by patient's altered mental status.    Pt being treated for urosepsis. Pt has multi system organ failure, End Stage Dementia.        Hospital Course:  No notes on file        Past Medical History:   Diagnosis Date    Acquired hypothyroidism 2/20/2013    Acute deep vein thrombosis (DVT) of popliteal vein of right lower extremity 07/01/2017    Acute saddle pulmonary embolism without acute cor pulmonale 7/2/2017    Benign non-nodular prostatic hyperplasia without lower urinary tract symptoms     Closed intertrochanteric fracture of right femur s/p IMN on 7/3/17 7/1/2017    Essential hypertension     HEARING LOSS     uses hearing aids    Macular degeneration - Both Eyes 1/15/2013    Mixed hyperlipidemia 10/30/2012    Paroxysmal atrial fibrillation 7/2/2017    Peripheral vascular disease 10/30/2012    Posterior vitreous detachment 1/15/2013    Pseudophakia of both eyes 1/15/2013    S/P AVR (aortic valve replacement) 10/30/2012    Scoliosis (and kyphoscoliosis), idiopathic     Seizure     Stroke     Vascular dementia without behavioral disturbance 10/9/2012     Vitamin D deficiency disease 7/6/2017       Past Surgical History:   Procedure Laterality Date    AORTIC VALVE REPLACEMENT  08/2011    23 mm porcine AVR    CARDIAC CATHETERIZATION  6/11    mild non-obstructive CAD    CATARACT EXTRACTION, BILATERAL      CHOLECYSTECTOMY      TON FILTER PLACEMENT  07/03/2017    Consider removal in 2 months     HEMORRHOID SURGERY      INTERTROCHANTERIC HIP FRACTURE SURGERY Right 07/03/2017    IM nail       Review of patient's allergies indicates:   Allergen Reactions    Penicillins Hives     Tolerated ceftriaxone and cephalexin       Medications:  Continuous Infusions:  Scheduled Meds:  PRN Meds:    Family History     Problem Relation (Age of Onset)    COPD Father, Brother        Social History Main Topics    Smoking status: Never Smoker    Smokeless tobacco: Never Used    Alcohol use No    Drug use: No    Sexual activity: Not on file       Review of Systems   Unable to perform ROS: Dementia     Objective:     Vital Signs (Most Recent):  Temp: 98.8 °F (37.1 °C) (09/21/17 1257)  Pulse: 91 (09/21/17 1356)  Resp: (!) 21 (09/21/17 1007)  BP: (!) 84/51 (09/21/17 1356)  SpO2: 96 % (09/21/17 1356) Vital Signs (24h Range):  Temp:  [98.8 °F (37.1 °C)-99.4 °F (37.4 °C)] 98.8 °F (37.1 °C)  Pulse:  [80-98] 91  Resp:  [21-22] 21  SpO2:  [85 %-97 %] 96 %  BP: (72-99)/(45-53) 84/51     Weight: 83.9 kg (185 lb)  Body mass index is 25.09 kg/m².    Review of Symptoms  Symptom Assessment (ESAS 0-10 scale)   ESAS 0 1 2 3 4 5 6 7 8 9 10   Pain              Dyspnea              Anxiety              Nausea              Depression               Anorexia              Fatigue              Insomnia              Restlessness               Agitation              CAM / Delirium __ --  ___+   Constipation     __ --  ___+   Diarrhea           __ --  ___+  Bowel Management Plan (BMP): No    Comments: Unable to do ROS due to mental status.     Performance Status: 0    ECOG Performance Status Grade: 4  - Completely disabled    Physical Exam   Constitutional: He has a sickly appearance. Face mask in place.   Cardiovascular: Normal rate and regular rhythm.    Pulmonary/Chest: He has rhonchi.   Abdominal: Normal appearance and bowel sounds are normal.   Neurological: He is unresponsive.   Skin:   pale       Significant Labs: All pertinent labs within the past 24 hours have been reviewed.  CBC:     Recent Labs  Lab 17  0959   WBC 5.40   HGB 8.8*   HCT 28.4*   MCV 94   *     BMP:    Recent Labs  Lab 17  0959   *      K 4.1   *   CO2 16*   BUN 27*   CREATININE 1.9*   CALCIUM 8.6*   MG 1.5*     LFT:  Lab Results   Component Value Date     (H) 2017    ALKPHOS 220 (H) 2017    BILITOT 1.4 (H) 2017     Albumin:   Albumin   Date Value Ref Range Status   2017 2.3 (L) 3.5 - 5.2 g/dL Final     Protein:   Total Protein   Date Value Ref Range Status   2017 6.3 6.0 - 8.4 g/dL Final     Lactic acid:   Lab Results   Component Value Date    LACTATE 9.1 (HH) 2017    LACTATE 0.8 2017       Significant Imaging: I have reviewed all pertinent imaging results/findings within the past 24 hours.    Advanced Directives::    LaPOST: Yes  Do Not Resuscitate Status: Yes  Medical Power of : Daughter, Marta next of kin    Decision-Making Capacity: Family answered questions    Living Arrangements: Lives in nursing home    Psychosocial/Cultural:  Pt has one daughter, Marta and sister-in-law at bedside.  Pt's wife is .     Spiritual:     F- Anjana and Belief: Shinto    I - Importance: yes   .  C - Community: yes    A - Address in Care: will have  visit. Spoke to chaplain Mariela      > 50% of 70 min visit spent in chart review, face to face discussion of goals of care,  symptom assessment, coordination of care and emotional support.    Sandra Melo, CNS  Palliative Medicine  [facility]

## 2017-09-21 NOTE — ASSESSMENT & PLAN NOTE
As above  -palliative care consulted  - consulted  - is in with family members while in ED @ approx 15:00

## 2017-09-21 NOTE — ASSESSMENT & PLAN NOTE
-patient s/p 1.5L bolus in ED, doses of Vancomycin and Cefepime  -Labs revealed multi-system organ failure and pt with durable DNR on arrival and further discussion with family, plan of care will be to focus on patient's comfort.     On my assessment patient is actively dying, having apenic spells with bradycardia, hypotensive.  Palliative care consulted, daughter at bedside  -initiated palliative comfort care order set, order morphine infusion now and PRN for terminal agitation or dyspnea.  -Patient  @ 17:40, family at bedside, daughter notified.

## 2017-09-21 NOTE — SUBJECTIVE & OBJECTIVE
Past Medical History:   Diagnosis Date    Acquired hypothyroidism 2/20/2013    Acute deep vein thrombosis (DVT) of popliteal vein of right lower extremity 07/01/2017    Acute saddle pulmonary embolism without acute cor pulmonale 7/2/2017    Benign non-nodular prostatic hyperplasia without lower urinary tract symptoms     Closed intertrochanteric fracture of right femur s/p IMN on 7/3/17 7/1/2017    Essential hypertension     HEARING LOSS     uses hearing aids    Macular degeneration - Both Eyes 1/15/2013    Mixed hyperlipidemia 10/30/2012    Paroxysmal atrial fibrillation 7/2/2017    Peripheral vascular disease 10/30/2012    Posterior vitreous detachment 1/15/2013    Pseudophakia of both eyes 1/15/2013    S/P AVR (aortic valve replacement) 10/30/2012    Scoliosis (and kyphoscoliosis), idiopathic     Seizure     Stroke     Vascular dementia without behavioral disturbance 10/9/2012    Vitamin D deficiency disease 7/6/2017       Past Surgical History:   Procedure Laterality Date    AORTIC VALVE REPLACEMENT  08/2011    23 mm porcine AVR    CARDIAC CATHETERIZATION  6/11    mild non-obstructive CAD    CATARACT EXTRACTION, BILATERAL      CHOLECYSTECTOMY      TON FILTER PLACEMENT  07/03/2017    Consider removal in 2 months     HEMORRHOID SURGERY      INTERTROCHANTERIC HIP FRACTURE SURGERY Right 07/03/2017    IM nail       Review of patient's allergies indicates:   Allergen Reactions    Penicillins Hives     Tolerated ceftriaxone and cephalexin       Medications:  Continuous Infusions:  Scheduled Meds:  PRN Meds:    Family History     Problem Relation (Age of Onset)    COPD Father, Brother        Social History Main Topics    Smoking status: Never Smoker    Smokeless tobacco: Never Used    Alcohol use No    Drug use: No    Sexual activity: Not on file       Review of Systems   Unable to perform ROS: Dementia     Objective:     Vital Signs (Most Recent):  Temp: 98.8 °F (37.1 °C)  (09/21/17 1257)  Pulse: 91 (09/21/17 1356)  Resp: (!) 21 (09/21/17 1007)  BP: (!) 84/51 (09/21/17 1356)  SpO2: 96 % (09/21/17 1356) Vital Signs (24h Range):  Temp:  [98.8 °F (37.1 °C)-99.4 °F (37.4 °C)] 98.8 °F (37.1 °C)  Pulse:  [80-98] 91  Resp:  [21-22] 21  SpO2:  [85 %-97 %] 96 %  BP: (72-99)/(45-53) 84/51     Weight: 83.9 kg (185 lb)  Body mass index is 25.09 kg/m².    Review of Symptoms  Symptom Assessment (ESAS 0-10 scale)   ESAS 0 1 2 3 4 5 6 7 8 9 10   Pain              Dyspnea              Anxiety              Nausea              Depression               Anorexia              Fatigue              Insomnia              Restlessness               Agitation              CAM / Delirium __ --  ___+   Constipation     __ --  ___+   Diarrhea           __ --  ___+  Bowel Management Plan (BMP): No    Comments: Unable to do ROS due to mental status.     Performance Status: 0    ECOG Performance Status Grade: 4 - Completely disabled    Physical Exam   Constitutional: He has a sickly appearance. Face mask in place.   Cardiovascular: Normal rate and regular rhythm.    Pulmonary/Chest: He has rhonchi.   Abdominal: Normal appearance and bowel sounds are normal.   Neurological: He is unresponsive.   Skin:   pale       Significant Labs: All pertinent labs within the past 24 hours have been reviewed.  CBC:     Recent Labs  Lab 09/21/17  0959   WBC 5.40   HGB 8.8*   HCT 28.4*   MCV 94   *     BMP:    Recent Labs  Lab 09/21/17  0959   *      K 4.1   *   CO2 16*   BUN 27*   CREATININE 1.9*   CALCIUM 8.6*   MG 1.5*     LFT:  Lab Results   Component Value Date     (H) 09/21/2017    ALKPHOS 220 (H) 09/21/2017    BILITOT 1.4 (H) 09/21/2017     Albumin:   Albumin   Date Value Ref Range Status   09/21/2017 2.3 (L) 3.5 - 5.2 g/dL Final     Protein:   Total Protein   Date Value Ref Range Status   09/21/2017 6.3 6.0 - 8.4 g/dL Final     Lactic acid:   Lab Results   Component Value Date    LACTATE 9.1  () 2017    LACTATE 0.8 2017       Significant Imaging: I have reviewed all pertinent imaging results/findings within the past 24 hours.    Advanced Directives::    LaPOST: Yes  Do Not Resuscitate Status: Yes  Medical Power of : Daughter, Marta next of kin    Decision-Making Capacity: Family answered questions    Living Arrangements: Lives in nursing home    Psychosocial/Cultural:  Pt has one daughter, Marta and sister-in-law at bedside.  Pt's wife is .     Spiritual:     F- Anjana and Belief: Judaism    I - Importance: yes   .  C - Community: yes    A - Address in Care: will have  visit. Spoke to chaplain Mariela

## 2017-09-21 NOTE — HPI
Pt is an 89 y/o male with a PMH of HTN, A fib with vascular dementia (IVC filter, anticoagulated on Eliquis), aortic valve replacement, epilepsy, obstructive uropathy requiring barton catheterization with recent UTI, hypothyroid, hip fracture s/p repair 7/3/17 who presents via EMS with altered mental status. Per chart review,  EMS reports the SNF called for altered mental status discovered early this morning, exact time unknown. Patient had pulled his barton yesterday afternoon. He was found to be unresponsive this morning with hypotension and hypoxia. Pupils were pinpoint and Narcan was given en route without response. Patient was given 250cc fluid bolus and Duoneb in route with moderate improvement in vitals. History is otherwise limited by patient's altered mental status.    Pt being treated for urosepsis. Pt has multi system organ failure, End Stage Dementia.

## 2017-09-21 NOTE — PROGRESS NOTES
Spoke to Dr. Mccray,  staff who has received pt. Per MD, pt started to have apneic episodes. Per MD, he started pt on comfort meds per comfort med order-set.    with pt and pt's family.  to mauricio.     Sandra YANES, APRN, AGCNS

## 2017-09-21 NOTE — ASSESSMENT & PLAN NOTE
Spoke to Dr. Monsalve. Pt has urosepsis, Multi- system organ failure, and End stage Dementia. Pt on face mask. Vital stable. Per MD, pt DNR and family interested in inpt hospice care.   Per MD, pt on antibiotics IV at this time. Pt will be admitted to IM.  This SENDY and Natalia Jo LCSW met with pt's daughter and sister-in-law.  Spoke to pt's daughter, Marta, who is at bedside and pt's sister-in-law. Wife .Daughter next of kin. Per daughter, her goals at this time is comfort.   Per Daughter, she was told there are inpt hospices pt can go to. Education done with daughter on hospice education, and inpt hospice. Per daughter, she is interested in hospice care at [facility] due to location.   Let daughter know pt may be admitted to IM Per ER MD. Explained to daughter, that hospice rep would need to come and meet with daughter to do paper-work.     Symptom management:  Per daughter, pt appears comfortable.    Would have available Morphine 1 mg IVP q hrs prn pain/ dyspnea.   Ativan 0.5 mg q 30 minutes prn agitation.   Glycopyrrolate 0.2 mg  Tid prn secretions.     After meeting with pt, spoke to Dr. Monsalve about conversation with pt. Pt to go to IM floor and then can d/c to inpt hospice.

## 2017-09-21 NOTE — DISCHARGE SUMMARY
"[facility] Hospital Medicine  Discharge Summary      Patient Name: Virgil Melo Jr.  MRN: [id number]  Admission Date: 9/21/2017  Hospital Length of Stay: 0 days  Discharge Date and Time: No discharge date for patient encounter.  Attending Physician: Alfred Zapata MD   Discharging Provider: Alfred Zapata MD  Primary Care Provider: Luis A Milton MD  [facility] Medicine Team: [facility]OSP MED  Alfred Zapata MD    HPI:   Unable to obtain hx from patient per ED note    "89 y/o WM hx of HTN, A fib with vascular dementia (IVC filter, anticoagulated on Eliquis), aortic valve replacement, epilepsy, obstructive uropathy requiring barton catheterization with recent UTI, hypothyroid, hip fracture s/p repair 7/3/17 who presents via EMS with altered mental status. Per EMS reports the SNF called for altered mental status discovered early this morning, exact time unknown. Patient pulled his barton yesterday afternoon. He was found to be unresponsive this morning with hypotension and hypoxia. Pupils were pinpoint and Narcan was given en route without response. Patient was given 250cc fluid bolus and Duoneb in route with moderate improvement in vitals. History is otherwise limited by patient's altered mental status."    Patient referred for admission, in discussion with ED LEANA Peguero, they have discussed with patient's daughter and lab results show patient with Septic Melinda presumed secondary to UTI, he has a durable DNR.  Patient with evidence of multisystem organ failure with ESTHER, acute liver injury, septic encephalopathy and neurologically unresponsive.  Plan is to transition patient to comfort measures, no escalation to the ICU and continue antibiotics.     On my evaluation patient's daughter at bedside, patient is unresponsive to verbal or physical stimuli,     * No surgery found *      Indwelling Lines/Drains at time of discharge:   Lines/Drains/Airways          No matching active lines, drains, or " airways        Hospital Course:   Patient brought to the floor approximately 16:00,  Palliative orderset in, but I was paged to bedside at 17:35 that patient unresponsive and no recorded vital signs.  Patient death pronouncement @ 17:40 on my exam.  Family declined autopsy,  notified as patient  within 24hrs of admission and  will release patient to  home without further evaluation.  Emotional support provided to family with  support,  support.      Consults:   Consults         Status Ordering Provider     Inpatient consult to Palliative Care  Once     Provider:  (Not yet assigned)    Completed RADHA OSWALD     Inpatient consult to Social Work  Once     Provider:  (Not yet assigned)    Acknowledged DIO LOZA          Significant Diagnostic Studies: Labs:   BMP:   Recent Labs  Lab 17  0959   *      K 4.1   *   CO2 16*   BUN 27*   CREATININE 1.9*   CALCIUM 8.6*   MG 1.5*   , CMP   Recent Labs  Lab 17  0959      K 4.1   *   CO2 16*   *   BUN 27*   CREATININE 1.9*   CALCIUM 8.6*   PROT 6.3   ALBUMIN 2.3*   BILITOT 1.4*   ALKPHOS 220*   *   *   ANIONGAP 15   ESTGFRAFRICA 35.6*   EGFRNONAA 30.8*   , CBC   Recent Labs  Lab 17  0959   WBC 5.40   HGB 8.8*   HCT 28.4*   *    and INR   Lab Results   Component Value Date    INR 1.3 (H) 2017    INR 1.1 2017    INR 1.0 2017     Microbiology:   Urine Culture    Lab Results   Component Value Date    LABURIN CITROBACTER KOSERI  > 100,000 cfu/ml   2017    LABURIN STAPHYLOCOCCUS AUREUS  > 100,000 cfu/ml   2017       Pending Diagnostic Studies:     None        Final Active Diagnoses:    Diagnosis Date Noted POA    PRINCIPAL PROBLEM:  Septic shock [A41.9, R65.21] 2017 Yes    Palliative care encounter [Z51.5] 2017 Not Applicable    Septic encephalopathy [G93.41] 2017 Yes    Acute cystitis with hematuria  [N30.01] 2017 Yes      Problems Resolved During this Admission:    Diagnosis Date Noted Date Resolved POA      * Septic shock    -patient s/p 1.5L bolus in ED, doses of Vancomycin and Cefepime  -Labs revealed multi-system organ failure and pt with durable DNR on arrival and further discussion with family, plan of care will be to focus on patient's comfort.     On my assessment patient is actively dying, having apenic spells with bradycardia, hypotensive.  Palliative care consulted, daughter at bedside  -initiated palliative comfort care order set, order morphine infusion now and PRN for terminal agitation or dyspnea.  -Patient  @ 17:40, family at bedside, daughter notified.             Discharged Condition:     Disposition:     Follow Up:    Patient Instructions:   No discharge procedures on file.  Medications:  None (patient  at medical facility)  Time spent on the discharge of patient: 35 minutes      Alfred Zapata MD  Department of Hospital Medicine  [facility]

## 2017-09-21 NOTE — H&P
"[facility] Hospital Medicine  History & Physical    Patient Name: Virgil Melo Jr.  MRN: [id number]  Admission Date: 9/21/2017  Attending Physician: Alfred Zapata MD  Primary Care Provider: Luis A Milton MD    [facility] Medicine Team: [facility] HOSP MED L Alfred Zapata MD     Patient information was obtained from relative(s), past medical records and ER records.     Subjective:     Principal Problem:Septic shock    Chief Complaint:   Chief Complaint   Patient presents with    Altered Mental Status     from [facility] for AMS, low Oxygen sats and hypotension; initial SBP 65/30        HPI: Unable to obtain hx from patient per ED note    "89 y/o WM hx of HTN, A fib with vascular dementia (IVC filter, anticoagulated on Eliquis), aortic valve replacement, epilepsy, obstructive uropathy requiring barton catheterization with recent UTI, hypothyroid, hip fracture s/p repair 7/3/17 who presents via EMS with altered mental status. Per EMS reports the SNF called for altered mental status discovered early this morning, exact time unknown. Patient pulled his barton yesterday afternoon. He was found to be unresponsive this morning with hypotension and hypoxia. Pupils were pinpoint and Narcan was given en route without response. Patient was given 250cc fluid bolus and Duoneb in route with moderate improvement in vitals. History is otherwise limited by patient's altered mental status."    Patient referred for admission, in discussion with ED LEANA Peguero, they have discussed with patient's daughter and lab results show patient with Septic Melinda presumed secondary to UTI, he has a durable DNR.  Patient with evidence of multisystem organ failure with ESTHER, acute liver injury, septic encephalopathy and neurologically unresponsive.  Plan is to transition patient to comfort measures, no escalation to the ICU and continue antibiotics.     On my evaluation patient's daughter at bedside, patient is " unresponsive to verbal or physical stimuli,     Past Medical History:   Diagnosis Date    Acquired hypothyroidism 2/20/2013    Acute deep vein thrombosis (DVT) of popliteal vein of right lower extremity 07/01/2017    Acute saddle pulmonary embolism without acute cor pulmonale 7/2/2017    Benign non-nodular prostatic hyperplasia without lower urinary tract symptoms     Closed intertrochanteric fracture of right femur s/p IMN on 7/3/17 7/1/2017    Essential hypertension     HEARING LOSS     uses hearing aids    Macular degeneration - Both Eyes 1/15/2013    Mixed hyperlipidemia 10/30/2012    Paroxysmal atrial fibrillation 7/2/2017    Peripheral vascular disease 10/30/2012    Posterior vitreous detachment 1/15/2013    Pseudophakia of both eyes 1/15/2013    S/P AVR (aortic valve replacement) 10/30/2012    Scoliosis (and kyphoscoliosis), idiopathic     Seizure     Stroke     Vascular dementia without behavioral disturbance 10/9/2012    Vitamin D deficiency disease 7/6/2017       Past Surgical History:   Procedure Laterality Date    AORTIC VALVE REPLACEMENT  08/2011    23 mm porcine AVR    CARDIAC CATHETERIZATION  6/11    mild non-obstructive CAD    CATARACT EXTRACTION, BILATERAL      CHOLECYSTECTOMY      TON FILTER PLACEMENT  07/03/2017    Consider removal in 2 months     HEMORRHOID SURGERY      INTERTROCHANTERIC HIP FRACTURE SURGERY Right 07/03/2017    IM nail       Review of patient's allergies indicates:   Allergen Reactions    Penicillins Hives     Tolerated ceftriaxone and cephalexin       No current facility-administered medications on file prior to encounter.      Current Outpatient Prescriptions on File Prior to Encounter   Medication Sig    acetaminophen (TYLENOL) 650 MG TbSR Take 650 mg by mouth every 6 (six) hours as needed (mild to moderate pain).    apixaban 5 mg Tab Take 1 tablet (5 mg total) by mouth 2 (two) times daily.    atorvastatin (LIPITOR) 40 MG tablet Take 1  tablet (40 mg total) by mouth once daily.    calcium carbonate (OS-ROSA) 500 mg calcium (1,250 mg) tablet Take 2 tablets (1,000 mg total) by mouth once daily.    carvedilol (COREG) 25 MG tablet Take 1 tablet (25 mg total) by mouth 2 (two) times daily.    ergocalciferol (ERGOCALCIFEROL) 50,000 unit Cap Take 1 capsule (50,000 Units total) by mouth Every Friday.    finasteride (PROSCAR) 5 mg tablet Take 1 tablet (5 mg total) by mouth once daily.    gabapentin (NEURONTIN) 800 MG tablet Take 1 tablet (800 mg total) by mouth 3 (three) times daily.    levetiracetam (KEPPRA) 750 MG Tab Take 1 tablet (750 mg total) by mouth 2 (two) times daily.    levothyroxine (SYNTHROID) 150 MCG tablet Take 1 tablet (150 mcg total) by mouth before breakfast.    memantine (NAMENDA) 10 MG Tab Take 1 tablet (10 mg total) by mouth 2 (two) times daily.    multivitamin (ONE DAILY MULTIVITAMIN) per tablet Take 1 tablet by mouth once daily.    phenobarbital (LUMINAL) 32.4 MG tablet Take 32.4 mg by mouth 3 (three) times daily.    quetiapine 50 mg oral tb24 (SEROQUEL XR) 50 mg Tb24 Take 1 tablet (50 mg total) by mouth once as needed (Agitation).    senna-docusate 8.6-50 mg (PERICOLACE) 8.6-50 mg per tablet Take 1 tablet by mouth 2 (two) times daily as needed for Constipation.    tamsulosin (FLOMAX) 0.4 mg Cp24 Take 1 capsule (0.4 mg total) by mouth once daily.    zinc sulfate 220 mg Tab Take 1 tablet by mouth once daily.     Family History     Problem Relation (Age of Onset)    COPD Father, Brother        Social History Main Topics    Smoking status: Never Smoker    Smokeless tobacco: Never Used    Alcohol use No    Drug use: No    Sexual activity: Not on file     Review of Systems   Unable to perform ROS: Patient unresponsive     Objective:     Vital Signs (Most Recent):  Temp: 96.4 °F (35.8 °C) (09/21/17 1614)  Pulse: 77 (09/21/17 1614)  Resp: (!) 23 (09/21/17 1614)  BP: (!) 69/46 (09/21/17 1614)  SpO2: (!) 89 % (09/21/17 1614)  Vital Signs (24h Range):  Temp:  [96.4 °F (35.8 °C)-99.4 °F (37.4 °C)] 96.4 °F (35.8 °C)  Pulse:  [33-98] 77  Resp:  [21-23] 23  SpO2:  [85 %-97 %] 89 %  BP: (67-99)/(35-53) 69/46     Weight: 83.9 kg (185 lb)  Body mass index is 25.09 kg/m².    Physical Exam   Constitutional:   pallor   Eyes:   approx 2mm, non-reactive to light   Cardiovascular:   Bradycardic, regular rhythm, carotid pulse present   Pulmonary/Chest:   agonal respiration, apneic episode approx 15 sec   Abdominal: Soft. Bowel sounds are normal.   Neurological: GCS eye subscore is 1. GCS verbal subscore is 1. GCS motor subscore is 1.   Skin: There is pallor.   Cool, dry to touch        Significant Labs:   BMP:   Recent Labs  Lab 09/21/17  0959   *      K 4.1   *   CO2 16*   BUN 27*   CREATININE 1.9*   CALCIUM 8.6*   MG 1.5*     CMP:   Recent Labs  Lab 09/21/17  0959      K 4.1   *   CO2 16*   *   BUN 27*   CREATININE 1.9*   CALCIUM 8.6*   PROT 6.3   ALBUMIN 2.3*   BILITOT 1.4*   ALKPHOS 220*   *   *   ANIONGAP 15   EGFRNONAA 30.8*     Cardiac Markers:   Recent Labs  Lab 09/21/17  0959   *     Coagulation:   Recent Labs  Lab 09/21/17  0959   INR 1.3*   APTT 23.1     Lactic Acid:   Recent Labs  Lab 09/21/17  0959   LACTATE 9.1*     Urine Studies:   Recent Labs  Lab 09/21/17  1258   COLORU Red*   APPEARANCEUA Cloudy*   PHUR 8.0   SPECGRAV 1.010   PROTEINUA 2+*   GLUCUA Negative   KETONESU Negative   BILIRUBINUA Negative   OCCULTUA 3+*   NITRITE Positive*   UROBILINOGEN Negative   LEUKOCYTESUR 3+*   RBCUA >100*   WBCUA >100*   BACTERIA Many*   HYALINECASTS 0       Significant Imaging: I have reviewed all pertinent imaging results/findings within the past 24 hours.    Assessment/Plan:     * Septic shock    -patient s/p 1.5L bolus in ED, doses of Vancomycin and Cefepime  -Labs revealed multi-system organ failure and pt with durable DNR on arrival and further discussion with family, plan of care will  be to focus on patient's comfort.     On my assessment patient is actively dying, having apenic spells with bradycardia, hypotensive.  Palliative care consulted, daughter at bedside  -initiated palliative comfort care order set, order morphine infusion now and PRN for terminal agitation or dyspnea.           Palliative care encounter    As above  -palliative care consulted  - consulted  - is in with family members while in ED @ approx 15:00          Septic encephalopathy    As above          Acute cystitis with hematuria    S/p cefepime & vancomycin            VTE Risk Mitigation     None             Alfred Zapata MD  Department of Hospital Medicine   [facility]

## 2017-09-21 NOTE — HPI
"Unable to obtain hx from patient per ED note    "87 y/o WM hx of HTN, A fib with vascular dementia (IVC filter, anticoagulated on Eliquis), aortic valve replacement, epilepsy, obstructive uropathy requiring barton catheterization with recent UTI, hypothyroid, hip fracture s/p repair 7/3/17 who presents via EMS with altered mental status. Per EMS reports the SNF called for altered mental status discovered early this morning, exact time unknown. Patient pulled his barton yesterday afternoon. He was found to be unresponsive this morning with hypotension and hypoxia. Pupils were pinpoint and Narcan was given en route without response. Patient was given 250cc fluid bolus and Duoneb in route with moderate improvement in vitals. History is otherwise limited by patient's altered mental status."    Patient referred for admission, in discussion with ED LEANA Peguero, they have discussed with patient's daughter and lab results show patient with Septic Melinda presumed secondary to UTI, he has a durable DNR.  Patient with evidence of multisystem organ failure with ESTHER, acute liver injury, septic encephalopathy and neurologically unresponsive.  Plan is to transition patient to comfort measures, no escalation to the ICU and continue antibiotics.     On my evaluation patient's daughter at bedside, patient is unresponsive to verbal or physical stimuli,   "

## 2017-09-21 NOTE — ED NOTES
Patient transported with 2 rns, myself and ANABEL Mesa RN to [address], patient moved onto hospital bed by ED nurses. Side rails x 4, call bell given to daughter and instructed on use, family remains at bedside. Patient placed on wall oxygen and bed lowered. Charge nurse Jed aware that patient was in room, [place] given to unit secretary with rest of chart. Patients radial pulse palpated 84, visible chest rise 20bpm upon departure at time notification to charge nurse. CRICKET Hester also aware of patients arrival

## 2017-09-21 NOTE — HOSPITAL COURSE
Patient brought to the floor approximately 16:00,  Palliative orderset in, but I was paged to bedside at 17:35 that patient unresponsive and no recorded vital signs.  Patient death pronouncement @ 17:40 on my exam.  Family declined autopsy,  notified as patient  within 24hrs of admission and  will release patient to  home without further evaluation.  Emotional support provided to family with  support,  support.

## 2017-09-21 NOTE — ED NOTES
Still awaiting tele box for transport, Anabela HARLEY (charge nurse) called again and stated it would be 15 min

## 2017-09-21 NOTE — ED PROVIDER NOTES
Encounter Date: 9/21/2017    SCRIBE #1 NOTE: I, Vidya Lemus, am scribing for, and in the presence of,  Dr. Monsalve. I have scribed the following portions of the note - the APC attestation and the EKG reading. Other sections scribed: CXR Reading.       History     Chief Complaint   Patient presents with    Altered Mental Status     from [facility] for AMS, low Oxygen sats and hypotension; initial SBP 65/30     This is an 88 year old male with a PMH of HTN, A fib with vascular dementia (IVC filter, anticoagulated on Eliquis), aortic valve replacement, epilepsy, obstructive uropathy requiring barton catheterization with recent UTI, hypothyroid, hip fracture s/p repair 7/3/17 who presents via EMS with altered mental status. Per EMS reports the SNF called for altered mental status discovered early this morning, exact time unknown. Patient pulled his barton yesterday afternoon. He was found to be unresponsive this morning with hypotension and hypoxia. Pupils were pinpoint and Narcan was given en route without response. Patient was given 250cc fluid bolus and Duoneb in route with moderate improvement in vitals. History is otherwise limited by patient's altered mental status.          Review of patient's allergies indicates:   Allergen Reactions    Penicillins Hives     Tolerated ceftriaxone and cephalexin     Past Medical History:   Diagnosis Date    Acquired hypothyroidism 2/20/2013    Acute deep vein thrombosis (DVT) of popliteal vein of right lower extremity 07/01/2017    Acute saddle pulmonary embolism without acute cor pulmonale 7/2/2017    Benign non-nodular prostatic hyperplasia without lower urinary tract symptoms     Closed intertrochanteric fracture of right femur s/p IMN on 7/3/17 7/1/2017    Essential hypertension     HEARING LOSS     uses hearing aids    Macular degeneration - Both Eyes 1/15/2013    Mixed hyperlipidemia 10/30/2012    Paroxysmal atrial fibrillation 7/2/2017     Peripheral vascular disease 10/30/2012    Posterior vitreous detachment 1/15/2013    Pseudophakia of both eyes 1/15/2013    S/P AVR (aortic valve replacement) 10/30/2012    Scoliosis (and kyphoscoliosis), idiopathic     Seizure     Stroke     Vascular dementia without behavioral disturbance 10/9/2012    Vitamin D deficiency disease 7/6/2017     Past Surgical History:   Procedure Laterality Date    AORTIC VALVE REPLACEMENT  08/2011    23 mm porcine AVR    CARDIAC CATHETERIZATION  6/11    mild non-obstructive CAD    CATARACT EXTRACTION, BILATERAL      CHOLECYSTECTOMY      TON FILTER PLACEMENT  07/03/2017    Consider removal in 2 months     HEMORRHOID SURGERY      INTERTROCHANTERIC HIP FRACTURE SURGERY Right 07/03/2017    IM nail     Family History   Problem Relation Age of Onset    COPD Father     COPD Brother      Social History   Substance Use Topics    Smoking status: Never Smoker    Smokeless tobacco: Never Used    Alcohol use No     Imaging Results          X-Ray Chest AP Portable (Final result)  Result time 09/21/17 11:09:54    Final result by Neo Fleming MD (09/21/17 11:09:54)                 Impression:     See above      Electronically signed by: Neo Fleming MD  Date:     09/21/17  Time:    11:09              Narrative:    Cardiac size is normal and postoperative changes are noted.  Lungs are clear.  minimally increased markings in the right lower lung field.  No other significant findings.  No evidence of cardiac failure.                              Review of Systems   Unable to perform ROS: Mental status change       Physical Exam     Initial Vitals [09/21/17 0953]   BP Pulse Resp Temp SpO2   (!) 72/45 97 (!) 22 99.4 °F (37.4 °C) (!) 85 %      MAP       54         Physical Exam    Constitutional: He appears well-developed and well-nourished. He appears ill. Nasal cannula in place.   HENT:   Head: Normocephalic and atraumatic.   Mouth/Throat: Mucous membranes are pale  and dry.   Eyes: Conjunctivae and EOM are normal. Right pupil is not reactive. Right pupil is round. Left pupil is not reactive. Left pupil is round.   Cardiovascular: Normal rate, regular rhythm and normal heart sounds.   Abdominal: Soft. Bowel sounds are normal. There is no tenderness. There is no rigidity, no rebound and no guarding.   Genitourinary:   Genitourinary Comments: Jay bleeding from the urethral meatus.   Neurological: He is unresponsive.   Unresponsive to painful stimuli.  Pinpoint pupils.   Skin: Skin is warm and dry. No rash noted. There is pallor.         ED Course   Procedures  Labs Reviewed   B-TYPE NATRIURETIC PEPTIDE - Abnormal; Notable for the following:        Result Value     (*)     All other components within normal limits   CBC W/ AUTO DIFFERENTIAL - Abnormal; Notable for the following:     RBC 3.02 (*)     Hemoglobin 8.8 (*)     Hematocrit 28.4 (*)     MCHC 31.0 (*)     RDW 17.0 (*)     Platelets 139 (*)     MPV 8.7 (*)     Mono # 0.1 (*)     Gran% 77.8 (*)     Mono% 0.9 (*)     All other components within normal limits    Narrative:     ADD ON PER ADAM CONTRERAS, ORDER #[id number]   11:01  09/21/2017    COMPREHENSIVE METABOLIC PANEL - Abnormal; Notable for the following:     Chloride 111 (*)     CO2 16 (*)     Glucose 136 (*)     BUN, Bld 27 (*)     Creatinine 1.9 (*)     Calcium 8.6 (*)     Albumin 2.3 (*)     Total Bilirubin 1.4 (*)     Alkaline Phosphatase 220 (*)      (*)      (*)     eGFR if  35.6 (*)     eGFR if non  30.8 (*)     All other components within normal limits   LACTIC ACID, PLASMA - Abnormal; Notable for the following:     Lactate (Lactic Acid) 9.1 (*)     All other components within normal limits   MAGNESIUM - Abnormal; Notable for the following:     Magnesium 1.5 (*)     All other components within normal limits   PROTIME-INR - Abnormal; Notable for the following:     Prothrombin Time 13.1 (*)     INR 1.3 (*)      All other components within normal limits   PROCALCITONIN - Abnormal; Notable for the following:     Procalcitonin 23.95 (*)     All other components within normal limits    Narrative:     ADD ON PER ADAM CONTRERAS, ORDER #[id number]   11:01 09/21/2017   ADD ON PER DR LOZA PHENOBARITAL LEVEL, ORDER #[id number]   11:07 09/21/2017    TROPONIN I - Abnormal; Notable for the following:     Troponin I 0.033 (*)     All other components within normal limits   URINALYSIS, REFLEX TO URINE CULTURE - Abnormal; Notable for the following:     Color, UA Red (*)     Appearance, UA Cloudy (*)     Protein, UA 2+ (*)     Occult Blood UA 3+ (*)     Nitrite, UA Positive (*)     Leukocytes, UA 3+ (*)     All other components within normal limits    Narrative:     Preferred Collection Type->Urine, Clean Catch  ONE CUP OF URINE   PHENOBARBITAL LEVEL - Abnormal; Notable for the following:     Phenobarbital 13.0 (*)     All other components within normal limits    Narrative:     ADD ON PER ADAM CONTRERAS, ORDER #[id number]   11:01 09/21/2017   ADD ON PER DR LOZA PHENOBARITAL LEVEL, ORDER #[id number]   11:07 09/21/2017    URINALYSIS MICROSCOPIC - Abnormal; Notable for the following:     RBC, UA >100 (*)     WBC, UA >100 (*)     WBC Clumps, UA Occasional (*)     Bacteria, UA Many (*)     All other components within normal limits    Narrative:     Preferred Collection Type->Urine, Clean Catch  ONE CUP OF URINE   CULTURE, BLOOD   CULTURE, BLOOD   CULTURE, URINE   APTT   CORTISOL, RANDOM    Narrative:     ADD ON PER ADAM CONTRERAS, ORDER #[id number]   11:01 09/21/2017   ADD ON PER DR LOZA PHENOBARITAL LEVEL, ORDER #[id number]   11:07 09/21/2017    LIPASE   PHOSPHORUS   TSH    Narrative:     ADD ON PER ADAM CONTRERAS, ORDER #[id number]   11:01 09/21/2017   ADD ON PER DR LOZA PHENOBARITAL LEVEL, ORDER #[id number]   11:07 09/21/2017    LEVETIRACETAM  (KEPPRA) LEVEL   PHENOBARBITAL LEVEL   LACTIC ACID, PLASMA    LEVETIRACETAM  (KEPPRA) LEVEL     EKG Readings: (Independently Interpreted)   NSR at 93 bpm with T wave flattening in lateral leads.        X-Rays:   Independently Interpreted Readings:   Chest X-Ray: No large consolidation or effusion.      Medical Decision Making:   History:   Old Medical Records: I decided to obtain old medical records.  Independently Interpreted Test(s):   I have ordered and independently interpreted X-rays - see prior notes.  I have ordered and independently interpreted EKG Reading(s) - see prior notes  Clinical Tests:   Lab Tests: Ordered and Reviewed  Radiological Study: Ordered and Reviewed  Medical Tests: Ordered and Reviewed  Other:   I have discussed this case with another health care provider.       APC / Resident Notes:   88 year old male with multiple co-morbidities presents with altered mental status following traumatic barton removal yesterday afternoon.  On exam patient is unresponsive, hypotensive, and hypoxic. Pupils are pinpoint and nonreactive. Course breath sounds and rhonchi throughout all lung fields. Abdomen is soft. There is bleeding from the urethral meatus.    Patient arrives via EMS with [employer] advanced directive indicating patient as DNR/DNI.  Discussed with patient's family who wish to proceed with IV fluids and antibiotics as indicated for patient's condition.    Discussed poor prognosis and potential for acute decline, in patient's presence, with family. Confirmed wishes for patien to be DNR with comfort measures taken.       Scribe Attestation:   Scribe #1: I performed the above scribed service and the documentation accurately describes the services I performed. I attest to the accuracy of the note.    Attending Attestation:     Physician Attestation Statement for NP/PA:   I have conducted a face to face encounter with this patient in addition to the NP/PA, due to Medical Complexity    Other NP/PA Attestation Additions:    History of Present Illness: Emergent  evaluation of 88 year old male presenting with AMS. Pt is from [facility], found to be unresponsive this AM. When EMS arrived he was hypotensive, hypoxic. Pt was seen immediately on arrival due to life threatening condition.     [place] POLST reviewed which states pt has DNR and wanted comfort care measures. This was discussed with family who requested antibiotics and IV fluids.     Labs reviewed significant for multiple organ injury with a lactate of 9. I have discussed these findings with pt and family at bedside. I think further aggressive treatment with IV pressors would potentially prolong his suffering. We have discussed palliative care measures. Consult was placed. Family is in agreement pending disposition.     Case discussed with palliative care.  They're recommending inpatient admission until patient transferred to inpatient hospice.  Social work consulted       Attending Critical Care:   Critical Care Times:   ==============================================================  · Total Critical Care Time - exclusive of procedural time: 45 minutes.  ==============================================================    Physician Attestation for Scribe:  Physician Attestation Statement for Scribe #1: I, Dr. Monsalve, reviewed documentation, as scribed by Vidya Lemus in my presence, and it is both accurate and complete.                 ED Course      Clinical Impression:   The primary encounter diagnosis was Altered mental status, unspecified altered mental status type. Diagnoses of DNR (do not resuscitate), Hypoxia, Hypotension, unspecified hypotension type, Sepsis, due to unspecified organism, and Multi-organ failure with liver failure were also pertinent to this visit.    Disposition:   Disposition: Admitted  Condition: Critical                        LEANA Isaac  09/21/17 5850

## 2017-09-21 NOTE — ASSESSMENT & PLAN NOTE
-patient s/p 1.5L bolus in ED, doses of Vancomycin and Cefepime  -Labs revealed multi-system organ failure and pt with durable DNR on arrival and further discussion with family, plan of care will be to focus on patient's comfort.     On my assessment patient is actively dying, having apenic spells with bradycardia, hypotensive.  Palliative care consulted, daughter at bedside  -initiated palliative comfort care order set, order morphine infusion now and PRN for terminal agitation or dyspnea.

## 2017-09-21 NOTE — SUBJECTIVE & OBJECTIVE
Past Medical History:   Diagnosis Date    Acquired hypothyroidism 2/20/2013    Acute deep vein thrombosis (DVT) of popliteal vein of right lower extremity 07/01/2017    Acute saddle pulmonary embolism without acute cor pulmonale 7/2/2017    Benign non-nodular prostatic hyperplasia without lower urinary tract symptoms     Closed intertrochanteric fracture of right femur s/p IMN on 7/3/17 7/1/2017    Essential hypertension     HEARING LOSS     uses hearing aids    Macular degeneration - Both Eyes 1/15/2013    Mixed hyperlipidemia 10/30/2012    Paroxysmal atrial fibrillation 7/2/2017    Peripheral vascular disease 10/30/2012    Posterior vitreous detachment 1/15/2013    Pseudophakia of both eyes 1/15/2013    S/P AVR (aortic valve replacement) 10/30/2012    Scoliosis (and kyphoscoliosis), idiopathic     Seizure     Stroke     Vascular dementia without behavioral disturbance 10/9/2012    Vitamin D deficiency disease 7/6/2017       Past Surgical History:   Procedure Laterality Date    AORTIC VALVE REPLACEMENT  08/2011    23 mm porcine AVR    CARDIAC CATHETERIZATION  6/11    mild non-obstructive CAD    CATARACT EXTRACTION, BILATERAL      CHOLECYSTECTOMY      TON FILTER PLACEMENT  07/03/2017    Consider removal in 2 months     HEMORRHOID SURGERY      INTERTROCHANTERIC HIP FRACTURE SURGERY Right 07/03/2017    IM nail       Review of patient's allergies indicates:   Allergen Reactions    Penicillins Hives     Tolerated ceftriaxone and cephalexin       No current facility-administered medications on file prior to encounter.      Current Outpatient Prescriptions on File Prior to Encounter   Medication Sig    acetaminophen (TYLENOL) 650 MG TbSR Take 650 mg by mouth every 6 (six) hours as needed (mild to moderate pain).    apixaban 5 mg Tab Take 1 tablet (5 mg total) by mouth 2 (two) times daily.    atorvastatin (LIPITOR) 40 MG tablet Take 1 tablet (40 mg total) by mouth once daily.     calcium carbonate (OS-ROSA) 500 mg calcium (1,250 mg) tablet Take 2 tablets (1,000 mg total) by mouth once daily.    carvedilol (COREG) 25 MG tablet Take 1 tablet (25 mg total) by mouth 2 (two) times daily.    ergocalciferol (ERGOCALCIFEROL) 50,000 unit Cap Take 1 capsule (50,000 Units total) by mouth Every Friday.    finasteride (PROSCAR) 5 mg tablet Take 1 tablet (5 mg total) by mouth once daily.    gabapentin (NEURONTIN) 800 MG tablet Take 1 tablet (800 mg total) by mouth 3 (three) times daily.    levetiracetam (KEPPRA) 750 MG Tab Take 1 tablet (750 mg total) by mouth 2 (two) times daily.    levothyroxine (SYNTHROID) 150 MCG tablet Take 1 tablet (150 mcg total) by mouth before breakfast.    memantine (NAMENDA) 10 MG Tab Take 1 tablet (10 mg total) by mouth 2 (two) times daily.    multivitamin (ONE DAILY MULTIVITAMIN) per tablet Take 1 tablet by mouth once daily.    phenobarbital (LUMINAL) 32.4 MG tablet Take 32.4 mg by mouth 3 (three) times daily.    quetiapine 50 mg oral tb24 (SEROQUEL XR) 50 mg Tb24 Take 1 tablet (50 mg total) by mouth once as needed (Agitation).    senna-docusate 8.6-50 mg (PERICOLACE) 8.6-50 mg per tablet Take 1 tablet by mouth 2 (two) times daily as needed for Constipation.    tamsulosin (FLOMAX) 0.4 mg Cp24 Take 1 capsule (0.4 mg total) by mouth once daily.    zinc sulfate 220 mg Tab Take 1 tablet by mouth once daily.     Family History     Problem Relation (Age of Onset)    COPD Father, Brother        Social History Main Topics    Smoking status: Never Smoker    Smokeless tobacco: Never Used    Alcohol use No    Drug use: No    Sexual activity: Not on file     Review of Systems   Unable to perform ROS: Patient unresponsive     Objective:     Vital Signs (Most Recent):  Temp: 96.4 °F (35.8 °C) (09/21/17 1614)  Pulse: 77 (09/21/17 1614)  Resp: (!) 23 (09/21/17 1614)  BP: (!) 69/46 (09/21/17 1614)  SpO2: (!) 89 % (09/21/17 1614) Vital Signs (24h Range):  Temp:  [96.4 °F (35.8  °C)-99.4 °F (37.4 °C)] 96.4 °F (35.8 °C)  Pulse:  [33-98] 77  Resp:  [21-23] 23  SpO2:  [85 %-97 %] 89 %  BP: (67-99)/(35-53) 69/46     Weight: 83.9 kg (185 lb)  Body mass index is 25.09 kg/m².    Physical Exam   Constitutional:   pallor   Eyes:   approx 2mm, non-reactive to light   Cardiovascular:   Bradycardic, regular rhythm, carotid pulse present   Pulmonary/Chest:   agonal respiration, apneic episode approx 15 sec   Abdominal: Soft. Bowel sounds are normal.   Neurological: GCS eye subscore is 1. GCS verbal subscore is 1. GCS motor subscore is 1.   Skin: There is pallor.   Cool, dry to touch        Significant Labs:   BMP:   Recent Labs  Lab 09/21/17  0959   *      K 4.1   *   CO2 16*   BUN 27*   CREATININE 1.9*   CALCIUM 8.6*   MG 1.5*     CMP:   Recent Labs  Lab 09/21/17  0959      K 4.1   *   CO2 16*   *   BUN 27*   CREATININE 1.9*   CALCIUM 8.6*   PROT 6.3   ALBUMIN 2.3*   BILITOT 1.4*   ALKPHOS 220*   *   *   ANIONGAP 15   EGFRNONAA 30.8*     Cardiac Markers:   Recent Labs  Lab 09/21/17  0959   *     Coagulation:   Recent Labs  Lab 09/21/17  0959   INR 1.3*   APTT 23.1     Lactic Acid:   Recent Labs  Lab 09/21/17  0959   LACTATE 9.1*     Urine Studies:   Recent Labs  Lab 09/21/17  1258   COLORU Red*   APPEARANCEUA Cloudy*   PHUR 8.0   SPECGRAV 1.010   PROTEINUA 2+*   GLUCUA Negative   KETONESU Negative   BILIRUBINUA Negative   OCCULTUA 3+*   NITRITE Positive*   UROBILINOGEN Negative   LEUKOCYTESUR 3+*   RBCUA >100*   WBCUA >100*   BACTERIA Many*   HYALINECASTS 0       Significant Imaging: I have reviewed all pertinent imaging results/findings within the past 24 hours.

## 2017-09-24 LAB
BACTERIA BLD CULT: NORMAL
BACTERIA UR CULT: NORMAL
BACTERIA UR CULT: NORMAL

## 2017-09-25 LAB
BACTERIA BLD CULT: NORMAL
LEVETIRACETAM SERPL-MCNC: 20.4 UG/ML (ref 3–60)

## 2017-10-06 NOTE — PHYSICIAN QUERY
PT Name: Virgil Melo Jr.  MR #: [id number]    Physician Query Form - Cause and Effect Relationship Clarification      CDS/: Josselin Cabello               Contact information:[email or web address]    This form is a permanent document in the medical record.     Query Date: October 6, 2017    By submitting this query, we are merely seeking further clarification of documentation. Please utilize your independent clinical judgment when addressing the question(s) below.    The Medical record contains the following:  Supporting Clinical Findings   Location in record    obstructive uropathy requiring barton catheterization with recent UTI,                                                                                                                                                                                    ED MD         Acute cystitis with hematuria    S/p cefepime & vancomycin                                                                                                                                                                                H/P         Provider, please clarify if there is any correlation between Acute Cystitis and Barton Catheter.           Are the conditions:     [  ] Due to or associated with each other     [  ] Unrelated to each other     [  ] Other (Please Specify): _________________________     [X  ] Clinically Undetermined

## 2020-10-14 NOTE — ASSESSMENT & PLAN NOTE
- as above   Patient arrives from work with complaint of fall. He was walking forward, down steps off of a truck, when he slipped on the last step, falling forward, landing on his knees in the gravel. The last step was an estimated two feet off the ground.  Patient complains of left knee pain

## 2021-09-08 NOTE — PLAN OF CARE
Problem: Patient Care Overview  Goal: Plan of Care Review  Outcome: Ongoing (interventions implemented as appropriate)  VSS and afebrile. Free from injury and falls. Alert, but disoriented x4.    DANII this shift.    Monitor for cont'd hematuria/pain/adverse events/reorientation.       Pt able to hear when speaking loud./Writing

## 2023-09-05 NOTE — ASSESSMENT & PLAN NOTE
Acute respiratory failure with hypoxia  Improved. Respiratory failure resolved. Patient noted on admit to have acute saddle embolus present on admit with acute hypoxic respiratory failure due to PE. Patient was initailly on NRB on admit and then titrated to high flow oxygen in MICU. Patient started on IV Heparin drip for treatment of PE and had IVC filter placed on 7/3 so patient could undergo operative fixation of right hip fracture. Patient remains on IV Heparin drip post-op. Respiratory failure resolved and patient now on room air and oxygen sats 92% and goal is to keep oxygen sats > 90%. IV Heparin drip discontinued on 7/5 and patient transitioned to oral Apixiban for long term anticoagulation for both atrial fibrillation and treatment of acute PE. Patient started on Apixiban 10 mg po BID for 7 days on 7/5 with end date of 7/12 and then 5 mg po BID starting on 7/13 for indefinite treatment of both PE and stroke prophylaxis for atrial fibrillation.      regular

## 2025-02-10 NOTE — NURSING
56 yo male with etoh cirrhosis admitted 12/24 w R spontaneous ICH w left sided deficits as well as progression to seizure activity. He was intubated at that time, started on AEDs and ultimately extubated and discharged on AEDs.     This admission, he presented to ED 1/24 w possible focal sz activity and CTH showing evolving ICH, was admitted and loaded with more AEDs. Obtained EEG. Serial CTH stable. Had some issues with secretions and was bronch'd 2/5. Extubated to NC 2/8. Overnight 2/9 pt appears to have developed an ileus/SBO, followed by significant emesis and likely aspiration pneumonitis. He became hypoxic and was intubated. Pt rapidly increased vent support to FiO2 100%, PEEP 20 with SpO2 reportedly in the 70s. After bronchoscopy, SpO2 in the upper 90s w pt still on FiO2 100% PEEP 20.    ECMO consulted for possible ARDs and candidacy for VV ECMO. Given history of ICH, seizures, etoh cirrhosis, pt deemed not a good candidate for VV ECMO support. RESP Score ~-4, Class IV risk. Pt appears to be responding positively to conventional mgmt at this time.    Recommendations:  -  Continue lung protective ventilation 4-8ml/kg  -  Maintain driving pressures <[id number], plateau <25 cmH20  -  CVP<8, recommend diuresis as tolerated by BP and renal function  -  Prone ventilation  -  Trial of continuous paralytic  -  Inhaled pulmonary artery vasodilator    Thank you for consulting for ECMO. Please reach back out with any questions should they arise.    Joselito Shi MD  [facility]   Pt returned from OR. Left radial art line in place, nerve block catheter to right anterior groin in place. Gauze/tegaderm over right groin sheath site. Pt alert and oriented.

## (undated) DEVICE — DRESSING N ADH OIL EMUL 3X8

## (undated) DEVICE — DRAPE C-ARMOR EQUIPMENT COVER

## (undated) DEVICE — COVER INSTR ELASTIC BAND 40X20

## (undated) DEVICE — WIRE GUIDE 3.2MM 400MM
Type: IMPLANTABLE DEVICE | Site: FEMUR | Status: NON-FUNCTIONAL
Removed: 2017-07-03

## (undated) DEVICE — DRAPE IOBAN 2 STERI

## (undated) DEVICE — BLADE SURG CARBON STEEL #10

## (undated) DEVICE — DRESSING TRANS 6X8 TEGADERM

## (undated) DEVICE — DRAPE C ARM 42 X 120 10/BX

## (undated) DEVICE — DRESSING TRANS 4X4 TEGADERM

## (undated) DEVICE — SPONGE LAP 18X18 PREWASHED

## (undated) DEVICE — DILATOR VESSEL 8FR

## (undated) DEVICE — SUT 0 VICRYL / CT-1

## (undated) DEVICE — COVERS PROBE NR-48 STERILE

## (undated) DEVICE — GAUZE SPONGE 4X4 12PLY

## (undated) DEVICE — IMPLANTABLE DEVICE
Type: IMPLANTABLE DEVICE | Site: FEMUR | Status: NON-FUNCTIONAL
Removed: 2017-07-03

## (undated) DEVICE — CLIPPER BLADE MOD 4406 (CAREF)

## (undated) DEVICE — DRESSING N ADH OIL EMUL 3X8 3S

## (undated) DEVICE — SEE MEDLINE ITEM 146347

## (undated) DEVICE — SOL NS 1000CC

## (undated) DEVICE — APPLICATOR CHLORAPREP ORN 26ML

## (undated) DEVICE — DRAPE PLASTIC U 60X72

## (undated) DEVICE — KIT PT CARE HANA PROFX SSXT

## (undated) DEVICE — GUIDEWIRE .035 175CM VERSACORE

## (undated) DEVICE — SYR MED RAD 150ML

## (undated) DEVICE — SUT D SPECIAL VICRYL 2-0

## (undated) DEVICE — SET DECANTER MEDICHOICE

## (undated) DEVICE — DRAPE STERI U-SHAPED 47X51IN

## (undated) DEVICE — SUT ETHILON 3/0 18IN PS-1

## (undated) DEVICE — SEE MEDLINE ITEM 152764

## (undated) DEVICE — SEE MEDLINE ITEM 157150

## (undated) DEVICE — Device

## (undated) DEVICE — TRAY MINOR ORTHO

## (undated) DEVICE — KIT INTRO MICRO NIT VSI 4FR